# Patient Record
Sex: MALE | Race: WHITE | Employment: OTHER | URBAN - METROPOLITAN AREA
[De-identification: names, ages, dates, MRNs, and addresses within clinical notes are randomized per-mention and may not be internally consistent; named-entity substitution may affect disease eponyms.]

---

## 2018-07-25 ENCOUNTER — OFFICE VISIT (OUTPATIENT)
Dept: ORTHOPEDIC SURGERY | Age: 68
End: 2018-07-25

## 2018-07-25 DIAGNOSIS — R20.0 NUMBNESS OF RIGHT HAND: ICD-10-CM

## 2018-07-25 DIAGNOSIS — M79.641 HAND PAIN, RIGHT: Primary | ICD-10-CM

## 2018-07-25 PROCEDURE — 1101F PT FALLS ASSESS-DOCD LE1/YR: CPT | Performed by: ORTHOPAEDIC SURGERY

## 2018-07-25 PROCEDURE — 4040F PNEUMOC VAC/ADMIN/RCVD: CPT | Performed by: ORTHOPAEDIC SURGERY

## 2018-07-25 PROCEDURE — 4004F PT TOBACCO SCREEN RCVD TLK: CPT | Performed by: ORTHOPAEDIC SURGERY

## 2018-07-25 PROCEDURE — 3017F COLORECTAL CA SCREEN DOC REV: CPT | Performed by: ORTHOPAEDIC SURGERY

## 2018-07-25 PROCEDURE — G8421 BMI NOT CALCULATED: HCPCS | Performed by: ORTHOPAEDIC SURGERY

## 2018-07-25 PROCEDURE — G8427 DOCREV CUR MEDS BY ELIG CLIN: HCPCS | Performed by: ORTHOPAEDIC SURGERY

## 2018-07-25 PROCEDURE — 99203 OFFICE O/P NEW LOW 30 MIN: CPT | Performed by: ORTHOPAEDIC SURGERY

## 2018-07-25 PROCEDURE — 1123F ACP DISCUSS/DSCN MKR DOCD: CPT | Performed by: ORTHOPAEDIC SURGERY

## 2018-07-25 NOTE — PROGRESS NOTES
tablet Take 1 tablet by mouth daily. No current facility-administered medications for this visit. Allergies   Allergen Reactions    Oxycodone-Acetaminophen Itching    Percodan [Oxycodone-Aspirin] Itching       ROS:  ROS neg except for positives in HPI    PHYSICAL EXAMINATION:    Gen/Psych: Examination reveals a pleasant individual in no acute distress. The patient is oriented to time, place and person. The patient's mood and affect are appropriate. Lymph: The lymphatic examination bilaterally reveals all areas to be without enlargement or induration. Skin intact without lymphadenopathy, discoloration, or abnormal temperature. Vascular: There is intact, symmetric circulation in both upper extremities. Musculoskeletal:       Cervical spine, shoulders, elbows, wrist:  satisfactory comfortable baseline range of motion, strength, and stability bilaterally       Right Hand and digits:   Satisfactory range of motion bilaterally. Mild thenar atrophy    Neurological:  Direct compression, Tinel's, and Phalen's testing reproduces the patient's symptoms into the median nerve distribution over the right carpal tunnel    Radiology:     X-rays obtained and reviewed in office:  Views 3  Location Right hand  Impression :  Arthritic change noted DIP joint index finger. Well aligned wrist without obvious arthritis    DIAGNOSTIC TESTING: EMG: were not done      IMPRESSION AND PLAN: Right hand numbness    Likely Right carpal tunnel syndrome. I have reviewed the diagnosis and evidence based treatment options for the patient, both surgical and non-surgical.      EMG has been ordered. Follow-up after testing to discuss results and treatment options. All questions and concerns were addressed today. Patient is in agreement with the plan.         Deryl Phalen, MD  Hand & Upper Extremity Surgery  [address]  A partner of [facility]

## 2018-08-07 ENCOUNTER — OFFICE VISIT (OUTPATIENT)
Dept: ORTHOPEDIC SURGERY | Age: 68
End: 2018-08-07

## 2018-08-07 DIAGNOSIS — G56.01 CARPAL TUNNEL SYNDROME OF RIGHT WRIST: Primary | ICD-10-CM

## 2018-08-07 PROCEDURE — 95908 NRV CNDJ TST 3-4 STUDIES: CPT | Performed by: PHYSICAL MEDICINE & REHABILITATION

## 2018-08-07 PROCEDURE — 95886 MUSC TEST DONE W/N TEST COMP: CPT | Performed by: PHYSICAL MEDICINE & REHABILITATION

## 2018-08-14 ENCOUNTER — OFFICE VISIT (OUTPATIENT)
Dept: ORTHOPEDIC SURGERY | Age: 68
End: 2018-08-14

## 2018-08-14 DIAGNOSIS — G56.01 CARPAL TUNNEL SYNDROME OF RIGHT WRIST: Primary | ICD-10-CM

## 2018-08-14 PROCEDURE — 99213 OFFICE O/P EST LOW 20 MIN: CPT | Performed by: ORTHOPAEDIC SURGERY

## 2018-08-14 PROCEDURE — 1101F PT FALLS ASSESS-DOCD LE1/YR: CPT | Performed by: ORTHOPAEDIC SURGERY

## 2018-08-14 PROCEDURE — 1123F ACP DISCUSS/DSCN MKR DOCD: CPT | Performed by: ORTHOPAEDIC SURGERY

## 2018-08-14 PROCEDURE — 3017F COLORECTAL CA SCREEN DOC REV: CPT | Performed by: ORTHOPAEDIC SURGERY

## 2018-08-14 PROCEDURE — 4040F PNEUMOC VAC/ADMIN/RCVD: CPT | Performed by: ORTHOPAEDIC SURGERY

## 2018-08-14 PROCEDURE — G8421 BMI NOT CALCULATED: HCPCS | Performed by: ORTHOPAEDIC SURGERY

## 2018-08-14 PROCEDURE — G8428 CUR MEDS NOT DOCUMENT: HCPCS | Performed by: ORTHOPAEDIC SURGERY

## 2018-08-14 PROCEDURE — 4004F PT TOBACCO SCREEN RCVD TLK: CPT | Performed by: ORTHOPAEDIC SURGERY

## 2018-08-14 NOTE — PROGRESS NOTES
Chief Complaint   Patient presents with    Follow-up     EMG RANDY CALDERON        HISTORY OF PRESENT ILLNESS:  Lowell Gomez is a 76 y.o.  patient here for repeat evaluation after undergoing EMG testing for suspected Right carpal tunnel syndrome. Symptoms persist despite conservative measures. Symptoms are keeping him up at night. ROS:  ROS neg     Past medical history, medications, and allergies are reviewed again today. There are no changes to report. PHYSICAL EXAMINATION:  Patient is alert and pleasant, in no acute distress. The affected extremity is examined once again today. The right hand and wrist reveal no atrophy. Positive Tinel and positive carpal tunnel compression test.   weakness is present. No sign of ganglion. No clawing of the digits. Decreased light touch sensation thumb index and middle finger. No triggering of the thumb. X-rays:  None needed today. EMG of the Right Upper Extremity is reviewed, impression:  Moderate to severe right carpal tunnel syndrome    IMPRESSION AND PLAN:  Right carpal tunnel syndrome    EMG discussed with the patient today and diagnosis reviewed. We discussed conservative and surgical intervention options, including, but not limited to, activity modifications, oral NSAIDs, splinting, therapy, and cortisone. After a detailed discussion today, the patient would like to proceed with right carpal tunnel release. Surgery to be outpatient, most likely under light sedation and local.  Surgery and time to recovery was outlined. The risks and benefits were discussed thoroughly involving mini-open carpal tunnel release. These included, but were not limited to infection, tendon or nerve injury, scar or thenar sensitivity, need for transfusion, adverse effects of anesthesia, incomplete relief of numbness, pain, and/or weakness. All questions and concerns were addressed today. Patient is in agreement with the plan.         Georges Parsons MD  Hand & Upper Extremity Surgery  [address] partner of [facility]

## 2018-08-22 ENCOUNTER — TELEPHONE (OUTPATIENT)
Dept: ORTHOPEDIC SURGERY | Age: 68
End: 2018-08-22

## 2018-08-30 RX ORDER — GABAPENTIN 300 MG/1
300 CAPSULE ORAL DAILY
Status: ON HOLD | COMMUNITY
End: 2022-10-02 | Stop reason: HOSPADM

## 2018-08-30 RX ORDER — OMEPRAZOLE 10 MG/1
20 CAPSULE, DELAYED RELEASE ORAL DAILY
COMMUNITY

## 2018-08-30 RX ORDER — ATORVASTATIN CALCIUM 80 MG/1
80 TABLET, FILM COATED ORAL DAILY
COMMUNITY

## 2018-08-30 NOTE — PROGRESS NOTES
Patient instructed to:  Bring Picture ID, insurance card, proof of address  Dress Comfortable  No jewelry  No Makeup  Shower evening before or morning of surgery with antibacterial soap. Nothing to eat or drink after midnight the day before surgery. If instructed by MD to take meds day of surgery, take with only a sip of water. If taking am beta blocker, take morning of surgery with sip of water per Dr. Marvin Lozano.

## 2018-09-05 ENCOUNTER — ANESTHESIA EVENT (OUTPATIENT)
Dept: OPERATING ROOM | Age: 68
End: 2018-09-05
Payer: MEDICARE

## 2018-09-06 ENCOUNTER — ANESTHESIA (OUTPATIENT)
Dept: OPERATING ROOM | Age: 68
End: 2018-09-06
Payer: MEDICARE

## 2018-09-06 ENCOUNTER — HOSPITAL ENCOUNTER (OUTPATIENT)
Age: 68
Setting detail: OUTPATIENT SURGERY
Discharge: HOME | End: 2018-09-06
Attending: ORTHOPAEDIC SURGERY | Admitting: ORTHOPAEDIC SURGERY
Payer: MEDICARE

## 2018-09-06 VITALS
HEART RATE: 74 BPM | TEMPERATURE: 97.1 F | BODY MASS INDEX: 40.63 KG/M2 | WEIGHT: 300 LBS | HEIGHT: 72 IN | RESPIRATION RATE: 18 BRPM | SYSTOLIC BLOOD PRESSURE: 121 MMHG | DIASTOLIC BLOOD PRESSURE: 65 MMHG | OXYGEN SATURATION: 96 %

## 2018-09-06 VITALS
RESPIRATION RATE: 2 BRPM | TEMPERATURE: 98.6 F | SYSTOLIC BLOOD PRESSURE: 90 MMHG | OXYGEN SATURATION: 97 % | DIASTOLIC BLOOD PRESSURE: 58 MMHG

## 2018-09-06 DIAGNOSIS — G56.01 CARPAL TUNNEL SYNDROME OF RIGHT WRIST: Primary | ICD-10-CM

## 2018-09-06 PROCEDURE — 7100000001 HC PACU RECOVERY - ADDTL 15 MIN: Performed by: ORTHOPAEDIC SURGERY

## 2018-09-06 PROCEDURE — 2709999900 HC NON-CHARGEABLE SUPPLY: Performed by: ORTHOPAEDIC SURGERY

## 2018-09-06 PROCEDURE — 2580000003 HC RX 258: Performed by: ANESTHESIOLOGY

## 2018-09-06 PROCEDURE — 2720000010 HC SURG SUPPLY STERILE: Performed by: ORTHOPAEDIC SURGERY

## 2018-09-06 PROCEDURE — 6360000002 HC RX W HCPCS: Performed by: ORTHOPAEDIC SURGERY

## 2018-09-06 PROCEDURE — 3700000001 HC ADD 15 MINUTES (ANESTHESIA): Performed by: ORTHOPAEDIC SURGERY

## 2018-09-06 PROCEDURE — 6360000002 HC RX W HCPCS: Performed by: NURSE ANESTHETIST, CERTIFIED REGISTERED

## 2018-09-06 PROCEDURE — 2500000003 HC RX 250 WO HCPCS: Performed by: ORTHOPAEDIC SURGERY

## 2018-09-06 PROCEDURE — 3700000000 HC ANESTHESIA ATTENDED CARE: Performed by: ORTHOPAEDIC SURGERY

## 2018-09-06 PROCEDURE — 3600000004 HC SURGERY LEVEL 4 BASE: Performed by: ORTHOPAEDIC SURGERY

## 2018-09-06 PROCEDURE — 3600000014 HC SURGERY LEVEL 4 ADDTL 15MIN: Performed by: ORTHOPAEDIC SURGERY

## 2018-09-06 PROCEDURE — 7100000011 HC PHASE II RECOVERY - ADDTL 15 MIN: Performed by: ORTHOPAEDIC SURGERY

## 2018-09-06 PROCEDURE — 7100000000 HC PACU RECOVERY - FIRST 15 MIN: Performed by: ORTHOPAEDIC SURGERY

## 2018-09-06 PROCEDURE — 7100000010 HC PHASE II RECOVERY - FIRST 15 MIN: Performed by: ORTHOPAEDIC SURGERY

## 2018-09-06 RX ORDER — LABETALOL HYDROCHLORIDE 5 MG/ML
5 INJECTION, SOLUTION INTRAVENOUS EVERY 10 MIN PRN
Status: DISCONTINUED | OUTPATIENT
Start: 2018-09-06 | End: 2018-09-06 | Stop reason: HOSPADM

## 2018-09-06 RX ORDER — MEPERIDINE HYDROCHLORIDE 50 MG/ML
12.5 INJECTION INTRAMUSCULAR; INTRAVENOUS; SUBCUTANEOUS EVERY 5 MIN PRN
Status: DISCONTINUED | OUTPATIENT
Start: 2018-09-06 | End: 2018-09-06 | Stop reason: HOSPADM

## 2018-09-06 RX ORDER — BUPIVACAINE HYDROCHLORIDE 5 MG/ML
INJECTION, SOLUTION EPIDURAL; INTRACAUDAL PRN
Status: DISCONTINUED | OUTPATIENT
Start: 2018-09-06 | End: 2018-09-06 | Stop reason: HOSPADM

## 2018-09-06 RX ORDER — ONDANSETRON 2 MG/ML
4 INJECTION INTRAMUSCULAR; INTRAVENOUS EVERY 10 MIN PRN
Status: DISCONTINUED | OUTPATIENT
Start: 2018-09-06 | End: 2018-09-06 | Stop reason: HOSPADM

## 2018-09-06 RX ORDER — PROPOFOL 10 MG/ML
INJECTION, EMULSION INTRAVENOUS PRN
Status: DISCONTINUED | OUTPATIENT
Start: 2018-09-06 | End: 2018-09-06 | Stop reason: SDUPTHER

## 2018-09-06 RX ORDER — LIDOCAINE HYDROCHLORIDE 10 MG/ML
1 INJECTION, SOLUTION EPIDURAL; INFILTRATION; INTRACAUDAL; PERINEURAL
Status: DISCONTINUED | OUTPATIENT
Start: 2018-09-06 | End: 2018-09-06 | Stop reason: HOSPADM

## 2018-09-06 RX ORDER — SODIUM CHLORIDE 0.9 % (FLUSH) 0.9 %
10 SYRINGE (ML) INJECTION PRN
Status: DISCONTINUED | OUTPATIENT
Start: 2018-09-06 | End: 2018-09-06 | Stop reason: HOSPADM

## 2018-09-06 RX ORDER — MIDAZOLAM HYDROCHLORIDE 1 MG/ML
INJECTION INTRAMUSCULAR; INTRAVENOUS PRN
Status: DISCONTINUED | OUTPATIENT
Start: 2018-09-06 | End: 2018-09-06 | Stop reason: SDUPTHER

## 2018-09-06 RX ORDER — HYDROCODONE BITARTRATE AND ACETAMINOPHEN 5; 325 MG/1; MG/1
1 TABLET ORAL EVERY 6 HOURS PRN
Qty: 25 TABLET | Refills: 0 | Status: SHIPPED | OUTPATIENT
Start: 2018-09-06 | End: 2018-09-13

## 2018-09-06 RX ORDER — SODIUM CHLORIDE 0.9 % (FLUSH) 0.9 %
10 SYRINGE (ML) INJECTION EVERY 12 HOURS SCHEDULED
Status: DISCONTINUED | OUTPATIENT
Start: 2018-09-06 | End: 2018-09-06 | Stop reason: HOSPADM

## 2018-09-06 RX ORDER — FENTANYL CITRATE 50 UG/ML
INJECTION, SOLUTION INTRAMUSCULAR; INTRAVENOUS PRN
Status: DISCONTINUED | OUTPATIENT
Start: 2018-09-06 | End: 2018-09-06 | Stop reason: SDUPTHER

## 2018-09-06 RX ORDER — SODIUM CHLORIDE, SODIUM LACTATE, POTASSIUM CHLORIDE, CALCIUM CHLORIDE 600; 310; 30; 20 MG/100ML; MG/100ML; MG/100ML; MG/100ML
INJECTION, SOLUTION INTRAVENOUS CONTINUOUS
Status: DISCONTINUED | OUTPATIENT
Start: 2018-09-06 | End: 2018-09-06 | Stop reason: HOSPADM

## 2018-09-06 RX ORDER — DEXAMETHASONE SODIUM PHOSPHATE 4 MG/ML
INJECTION, SOLUTION INTRA-ARTICULAR; INTRALESIONAL; INTRAMUSCULAR; INTRAVENOUS; SOFT TISSUE PRN
Status: DISCONTINUED | OUTPATIENT
Start: 2018-09-06 | End: 2018-09-06 | Stop reason: SDUPTHER

## 2018-09-06 RX ORDER — ONDANSETRON 2 MG/ML
INJECTION INTRAMUSCULAR; INTRAVENOUS PRN
Status: DISCONTINUED | OUTPATIENT
Start: 2018-09-06 | End: 2018-09-06 | Stop reason: SDUPTHER

## 2018-09-06 RX ORDER — HYDRALAZINE HYDROCHLORIDE 20 MG/ML
5 INJECTION INTRAMUSCULAR; INTRAVENOUS EVERY 10 MIN PRN
Status: DISCONTINUED | OUTPATIENT
Start: 2018-09-06 | End: 2018-09-06 | Stop reason: HOSPADM

## 2018-09-06 RX ADMIN — Medication 3 G: at 08:28

## 2018-09-06 RX ADMIN — PROPOFOL 150 MG: 10 INJECTION, EMULSION INTRAVENOUS at 08:25

## 2018-09-06 RX ADMIN — MIDAZOLAM HYDROCHLORIDE 2 MG: 2 INJECTION, SOLUTION INTRAMUSCULAR; INTRAVENOUS at 08:22

## 2018-09-06 RX ADMIN — FENTANYL CITRATE 100 MCG: 50 INJECTION INTRAMUSCULAR; INTRAVENOUS at 08:22

## 2018-09-06 RX ADMIN — DEXAMETHASONE SODIUM PHOSPHATE 10 MG: 4 INJECTION, SOLUTION INTRAMUSCULAR; INTRAVENOUS at 08:31

## 2018-09-06 RX ADMIN — ONDANSETRON 4 MG: 2 INJECTION INTRAMUSCULAR; INTRAVENOUS at 08:31

## 2018-09-06 RX ADMIN — SODIUM CHLORIDE, POTASSIUM CHLORIDE, SODIUM LACTATE AND CALCIUM CHLORIDE: 600; 310; 30; 20 INJECTION, SOLUTION INTRAVENOUS at 06:55

## 2018-09-06 RX ADMIN — PROPOFOL 50 MG: 10 INJECTION, EMULSION INTRAVENOUS at 08:32

## 2018-09-06 ASSESSMENT — PAIN - FUNCTIONAL ASSESSMENT: PAIN_FUNCTIONAL_ASSESSMENT: 0-10

## 2018-09-06 ASSESSMENT — PULMONARY FUNCTION TESTS
PIF_VALUE: 2
PIF_VALUE: 5
PIF_VALUE: 0
PIF_VALUE: 3
PIF_VALUE: 7
PIF_VALUE: 3
PIF_VALUE: 3
PIF_VALUE: 2
PIF_VALUE: 0
PIF_VALUE: 4
PIF_VALUE: 2
PIF_VALUE: 1
PIF_VALUE: 1
PIF_VALUE: 7
PIF_VALUE: 2
PIF_VALUE: 0

## 2018-09-06 ASSESSMENT — PAIN SCALES - GENERAL: PAINLEVEL_OUTOF10: 0

## 2018-09-06 NOTE — ANESTHESIA PRE PROCEDURE
Department of Anesthesiology  Preprocedure Note       Name:  Terry Amanda   Age:  76 y.o.  :  1950                                          MRN:  [id number]         Date:  2018      Surgeon: Cedric Hines):  Kay Khan MD    Procedure: Procedure(s):  RIGHT CARPAL TUNNEL RELEASE    Medications prior to admission:   Prior to Admission medications    Medication Sig Start Date End Date Taking? Authorizing Provider   LOSARTAN POTASSIUM-HCTZ PO Take by mouth daily   Yes Historical Provider, MD   omeprazole (PRILOSEC) 10 MG delayed release capsule Take 10 mg by mouth daily   Yes Historical Provider, MD   atorvastatin (LIPITOR) 80 MG tablet Take 80 mg by mouth daily   Yes Historical Provider, MD   gabapentin (NEURONTIN) 300 MG capsule Take 300 mg by mouth daily. .   Yes Historical Provider, MD   celecoxib (CELEBREX) 200 MG capsule Take 200 mg by mouth daily. Historical Provider, MD   FLUoxetine (PROZAC) 10 MG tablet Take 10 mg by mouth daily. Historical Provider, MD       Current medications:    No current facility-administered medications for this encounter. Current Outpatient Prescriptions   Medication Sig Dispense Refill    LOSARTAN POTASSIUM-HCTZ PO Take by mouth daily      omeprazole (PRILOSEC) 10 MG delayed release capsule Take 10 mg by mouth daily      atorvastatin (LIPITOR) 80 MG tablet Take 80 mg by mouth daily      gabapentin (NEURONTIN) 300 MG capsule Take 300 mg by mouth daily. Gege Goes celecoxib (CELEBREX) 200 MG capsule Take 200 mg by mouth daily.  FLUoxetine (PROZAC) 10 MG tablet Take 10 mg by mouth daily. Allergies:     Allergies   Allergen Reactions    Adhesive Tape Other (See Comments)     blisters    Oxycodone-Acetaminophen Itching    Percodan [Oxycodone-Aspirin] Itching       Problem List:    Patient Active Problem List   Diagnosis Code    Left knee pain M25.562    Baker cyst M71.20    Osteoarthritis of left knee M17.12    Knee effusion M25.469

## 2018-09-06 NOTE — PROGRESS NOTES
Discharge instructions reviewed with patient and family. Wife at bedside. Tolerating fluids.  No complaints of pain

## 2018-09-06 NOTE — OP NOTE
Kandy Michel (1950)    Date of Surgery- 9/6/2018    Pre-Op Diagnoses:  1.   right carpal tunnel syndrome  2. Post-op Diagnoses:   1. Same  2. Procedure(s) Performed:  1.  right carpal tunnel release, mini open  2. Surgeon: Dian Saxena MD    Anesthesia Type:   Local/Gen    Blood Loss:   2 cc    Pathology:   None    Complications:   None    Assistant:  None    The right palm was marked in preop holding by Dr Biju Dorman after discussing the surgical procedure once again with the patient. All questions and concerns were addressed. Informed consent was on the chart. The patient was brought to the operating and room and placed in the supine position. After the induction of anesthesia a standard time-out was performed. Description of the Procedure: We verified that antibiotics had been given. The right upper extremity was prepped and draped in normal sterile fashion. Formal timeout had been held before the injection and once again after the draping procedure. The upper extremity was exsanguinated and the tourniquet was inflated to 250 mmHg. A standard 1.5 cm incision was made in the mid palm. This was carried down through the subcutaneous tissues and palmar fascia to the transverse carpal ligament. The distal one half of the transverse carpal ligament was incised longitudinally under direct vision using a #15 blade. The carpal tunnel release guide was then placed under direct vision beneath the transverse carpal ligament and slid proximally. The guide was palpated into appropriate alignment longitudinally. Contact with the transverse carpal ligament was maintained throughout passing. The blade was engaged into the guide and the remaining portion of the transverse carpal ligament released completely. No other abnormalities were noted. The nerve was nicely decompressed and healthy-appearing.   The wound was copiously irrigated and the skin closed using horizontal mattress #4-0 nylon suture. Tourniquet was deflated and all fingers were warm and well perfused. Soft sterile dressing was placed. Patient was awoken from the sedation, tolerated the case quite well, was taken to the postanesthesia recovery unit in good condition. No complications. Findings:  No aberrant motor branch    Intervention:  1% Xylocaine, 0.25% Marcaine, without epinephrine as local injection    Other Notes: Follow-up in 7-10 days for wound inspection and likely suture removal.  Patient will be taught a home exercise range of motion program for the wrist and fingers along with scar and thenar massage. If there are any concerns or if the patient desires, hand therapy will be ordered.       Russell Lehman MD  Hand & Upper Extremity Surgery  [address] [facility] partner of [facility] ([facility])

## 2018-09-06 NOTE — PROGRESS NOTES
To PACU per cart. Oral airway in place. Removed per CRNA. Patient denies pain when asked.  Report received from OR staff

## 2018-09-14 ENCOUNTER — TELEPHONE (OUTPATIENT)
Dept: ORTHOPEDIC SURGERY | Age: 68
End: 2018-09-14

## 2018-09-19 ENCOUNTER — OFFICE VISIT (OUTPATIENT)
Dept: ORTHOPEDIC SURGERY | Age: 68
End: 2018-09-19

## 2018-09-19 DIAGNOSIS — G56.01 CARPAL TUNNEL SYNDROME ON RIGHT: Primary | ICD-10-CM

## 2018-09-19 DIAGNOSIS — G56.01 CARPAL TUNNEL SYNDROME OF RIGHT WRIST: Primary | ICD-10-CM

## 2018-09-19 PROCEDURE — 99024 POSTOP FOLLOW-UP VISIT: CPT | Performed by: ORTHOPAEDIC SURGERY

## 2018-09-19 RX ORDER — METHYLPREDNISOLONE 4 MG/1
TABLET ORAL
Qty: 1 KIT | Refills: 0 | Status: SHIPPED | OUTPATIENT
Start: 2018-09-19 | End: 2018-09-19 | Stop reason: CLARIF

## 2018-09-19 RX ORDER — METHYLPREDNISOLONE 4 MG/1
TABLET ORAL
Qty: 1 KIT | Refills: 0 | Status: SHIPPED | OUTPATIENT
Start: 2018-09-19 | End: 2018-09-25

## 2018-09-19 RX ORDER — HYDROCODONE BITARTRATE AND ACETAMINOPHEN 5; 325 MG/1; MG/1
1 TABLET ORAL EVERY 8 HOURS PRN
Qty: 21 TABLET | Refills: 0 | Status: SHIPPED | OUTPATIENT
Start: 2018-09-19 | End: 2018-09-19 | Stop reason: CLARIF

## 2018-09-19 RX ORDER — HYDROCODONE BITARTRATE AND ACETAMINOPHEN 5; 325 MG/1; MG/1
1 TABLET ORAL EVERY 8 HOURS PRN
Qty: 21 TABLET | Refills: 0 | Status: SHIPPED | OUTPATIENT
Start: 2018-09-19 | End: 2018-09-26

## 2018-09-19 NOTE — PROGRESS NOTES
Chief Complaint   Patient presents with    Post-Op Check     Right hand CTR sx 9/6/2018       HISTORY OF PRESENT ILLNESS:  Lowell Gomez is a 76 y.o.  patient here for post-op follow up after right carpal tunnel release. Surgery was now 2 weeks ago. The patient is doing quite well overall. Nocturnal symptoms are gone. Radial hand numbness is better but not gone    ROS:  ROS neg     MEDICAL HISTORY:  Patient's medications, allergies, past medical, surgical, social and family histories were reviewed and updated as appropriate. PHYSICAL EXAMINATION:  Alert and pleasant, no distress. The right hand and wrist is examined. The wound is healing without signs of infection or dehiscense. Mild swelling noted. There is satisfactory range of motion of the wrist and fingers. Thumb abduction is present. Fingers are well perfused. IMPRESSION AND PLAN:  right carpal tunnel sydrome  Doing well after right carpal tunnel release. We will plan for suture removal and discuss appropriate wound care. We discussed scar and thenar massage, progressive ROM of hand and fingers. Activities may be advanced depending on comfort. If patient desires hand therapy at any point during recovery, we will be happy to place this order. We have reordered pain medication, he understands be weaning off. For the postoperative inflammation about the nerve we have ordered steroid, he does not have diabetes. Cemented benefits outlined. He is taken this medication the past well without side effect. Follow-up in 8 weeks unless symptoms dictate sooner. All questions and concerns were addressed today. Patient is in agreement with the plan.         Georges Parsons MD  Hand & Upper Extremity Surgery  [address] [place] [address] partner of [facility] [address]

## 2018-10-17 ENCOUNTER — OFFICE VISIT (OUTPATIENT)
Dept: ORTHOPEDIC SURGERY | Age: 68
End: 2018-10-17
Payer: MEDICARE

## 2018-10-17 VITALS — HEIGHT: 72 IN | WEIGHT: 300.05 LBS | BODY MASS INDEX: 40.64 KG/M2

## 2018-10-17 DIAGNOSIS — G56.01 CARPAL TUNNEL SYNDROME ON RIGHT: Primary | ICD-10-CM

## 2018-10-17 PROCEDURE — 20526 THER INJECTION CARP TUNNEL: CPT | Performed by: ORTHOPAEDIC SURGERY

## 2018-10-17 PROCEDURE — 99024 POSTOP FOLLOW-UP VISIT: CPT | Performed by: ORTHOPAEDIC SURGERY

## 2018-10-17 PROCEDURE — L3908 WHO COCK-UP NONMOLDE PRE OTS: HCPCS | Performed by: ORTHOPAEDIC SURGERY

## 2018-10-17 RX ORDER — OXYCODONE HYDROCHLORIDE AND ACETAMINOPHEN 5; 325 MG/1; MG/1
1 TABLET ORAL 2 TIMES DAILY
Qty: 14 TABLET | Refills: 0 | Status: SHIPPED | OUTPATIENT
Start: 2018-10-17 | End: 2018-10-24

## 2018-12-04 ENCOUNTER — TELEPHONE (OUTPATIENT)
Dept: ORTHOPEDIC SURGERY | Age: 68
End: 2018-12-04

## 2018-12-05 ENCOUNTER — TELEPHONE (OUTPATIENT)
Dept: ORTHOPEDIC SURGERY | Age: 68
End: 2018-12-05

## 2018-12-18 ENCOUNTER — OFFICE VISIT (OUTPATIENT)
Dept: ORTHOPEDIC SURGERY | Age: 68
End: 2018-12-18
Payer: MEDICARE

## 2018-12-18 VITALS
WEIGHT: 300.05 LBS | SYSTOLIC BLOOD PRESSURE: 132 MMHG | HEIGHT: 72 IN | HEART RATE: 69 BPM | DIASTOLIC BLOOD PRESSURE: 82 MMHG | BODY MASS INDEX: 40.64 KG/M2

## 2018-12-18 DIAGNOSIS — G56.01 CARPAL TUNNEL SYNDROME OF RIGHT WRIST: Primary | ICD-10-CM

## 2018-12-18 PROCEDURE — 4040F PNEUMOC VAC/ADMIN/RCVD: CPT | Performed by: ORTHOPAEDIC SURGERY

## 2018-12-18 PROCEDURE — 99212 OFFICE O/P EST SF 10 MIN: CPT | Performed by: ORTHOPAEDIC SURGERY

## 2018-12-18 PROCEDURE — G8484 FLU IMMUNIZE NO ADMIN: HCPCS | Performed by: ORTHOPAEDIC SURGERY

## 2018-12-18 PROCEDURE — 1101F PT FALLS ASSESS-DOCD LE1/YR: CPT | Performed by: ORTHOPAEDIC SURGERY

## 2018-12-18 PROCEDURE — 1036F TOBACCO NON-USER: CPT | Performed by: ORTHOPAEDIC SURGERY

## 2018-12-18 PROCEDURE — G8417 CALC BMI ABV UP PARAM F/U: HCPCS | Performed by: ORTHOPAEDIC SURGERY

## 2018-12-18 PROCEDURE — 1123F ACP DISCUSS/DSCN MKR DOCD: CPT | Performed by: ORTHOPAEDIC SURGERY

## 2018-12-18 PROCEDURE — G8427 DOCREV CUR MEDS BY ELIG CLIN: HCPCS | Performed by: ORTHOPAEDIC SURGERY

## 2018-12-18 PROCEDURE — 3017F COLORECTAL CA SCREEN DOC REV: CPT | Performed by: ORTHOPAEDIC SURGERY

## 2019-02-04 DIAGNOSIS — G56.01 CARPAL TUNNEL SYNDROME ON RIGHT: Primary | ICD-10-CM

## 2019-02-04 PROCEDURE — L3908 WHO COCK-UP NONMOLDE PRE OTS: HCPCS | Performed by: ORTHOPAEDIC SURGERY

## 2021-06-02 ENCOUNTER — CLINICAL DOCUMENTATION (OUTPATIENT)
Dept: OTHER | Age: 71
End: 2021-06-02

## 2021-08-24 ENCOUNTER — APPOINTMENT (OUTPATIENT)
Dept: CT IMAGING | Age: 71
End: 2021-08-24
Payer: MEDICARE

## 2021-08-24 ENCOUNTER — HOSPITAL ENCOUNTER (EMERGENCY)
Age: 71
Discharge: HOME | End: 2021-08-25
Attending: EMERGENCY MEDICINE | Admitting: EMERGENCY MEDICINE
Payer: MEDICARE

## 2021-08-24 ENCOUNTER — APPOINTMENT (OUTPATIENT)
Dept: GENERAL RADIOLOGY | Age: 71
End: 2021-08-24
Payer: MEDICARE

## 2021-08-24 DIAGNOSIS — I10 UNCONTROLLED HYPERTENSION: Primary | ICD-10-CM

## 2021-08-24 LAB
A/G RATIO: 1.1 (ref 1.1–2.2)
ALBUMIN SERPL-MCNC: 4.3 G/DL (ref 3.4–5)
ALP BLD-CCNC: 114 U/L (ref 40–129)
ALT SERPL-CCNC: 16 U/L (ref 10–40)
ANION GAP SERPL CALCULATED.3IONS-SCNC: 12 MMOL/L (ref 3–16)
AST SERPL-CCNC: 19 U/L (ref 15–37)
BACTERIA: ABNORMAL /HPF
BASOPHILS ABSOLUTE: 0 K/UL (ref 0–0.2)
BASOPHILS RELATIVE PERCENT: 0.4 %
BILIRUB SERPL-MCNC: 0.8 MG/DL (ref 0–1)
BILIRUBIN URINE: NEGATIVE
BLOOD, URINE: NEGATIVE
BUN BLDV-MCNC: 17 MG/DL (ref 7–20)
CALCIUM SERPL-MCNC: 10.3 MG/DL (ref 8.3–10.6)
CHLORIDE BLD-SCNC: 100 MMOL/L (ref 99–110)
CLARITY: CLEAR
CO2: 24 MMOL/L (ref 21–32)
COLOR: YELLOW
CREAT SERPL-MCNC: 0.8 MG/DL (ref 0.8–1.3)
EOSINOPHILS ABSOLUTE: 0 K/UL (ref 0–0.6)
EOSINOPHILS RELATIVE PERCENT: 0 %
EPITHELIAL CELLS, UA: ABNORMAL /HPF (ref 0–5)
GFR AFRICAN AMERICAN: >60
GFR NON-AFRICAN AMERICAN: >60
GLOBULIN: 3.8 G/DL
GLUCOSE BLD-MCNC: 139 MG/DL (ref 70–99)
GLUCOSE URINE: NEGATIVE MG/DL
HCT VFR BLD CALC: 45.9 % (ref 40.5–52.5)
HEMOGLOBIN: 15.9 G/DL (ref 13.5–17.5)
HYALINE CASTS: ABNORMAL /LPF (ref 0–2)
KETONES, URINE: NEGATIVE MG/DL
LEUKOCYTE ESTERASE, URINE: NEGATIVE
LIPASE: 10 U/L (ref 13–60)
LYMPHOCYTES ABSOLUTE: 1 K/UL (ref 1–5.1)
LYMPHOCYTES RELATIVE PERCENT: 7.7 %
MCH RBC QN AUTO: 30.7 PG (ref 26–34)
MCHC RBC AUTO-ENTMCNC: 34.6 G/DL (ref 31–36)
MCV RBC AUTO: 88.5 FL (ref 80–100)
MICROSCOPIC EXAMINATION: YES
MONOCYTES ABSOLUTE: 0.3 K/UL (ref 0–1.3)
MONOCYTES RELATIVE PERCENT: 2.7 %
MUCUS: ABNORMAL /LPF
NEUTROPHILS ABSOLUTE: 11.2 K/UL (ref 1.7–7.7)
NEUTROPHILS RELATIVE PERCENT: 89.2 %
NITRITE, URINE: NEGATIVE
PDW BLD-RTO: 13.4 % (ref 12.4–15.4)
PH UA: 6.5 (ref 5–8)
PLATELET # BLD: 212 K/UL (ref 135–450)
PMV BLD AUTO: 9.1 FL (ref 5–10.5)
POTASSIUM REFLEX MAGNESIUM: 3.9 MMOL/L (ref 3.5–5.1)
PROTEIN UA: 100 MG/DL
RBC # BLD: 5.18 M/UL (ref 4.2–5.9)
RBC UA: ABNORMAL /HPF (ref 0–4)
SODIUM BLD-SCNC: 136 MMOL/L (ref 136–145)
SPECIFIC GRAVITY UA: >=1.03 (ref 1–1.03)
SPECIMEN STATUS: NORMAL
TOTAL PROTEIN: 8.1 G/DL (ref 6.4–8.2)
TROPONIN: <0.01 NG/ML
URINE REFLEX TO CULTURE: ABNORMAL
URINE TYPE: ABNORMAL
UROBILINOGEN, URINE: 0.2 E.U./DL
WBC # BLD: 12.6 K/UL (ref 4–11)
WBC UA: ABNORMAL /HPF (ref 0–5)

## 2021-08-24 PROCEDURE — 99284 EMERGENCY DEPT VISIT MOD MDM: CPT

## 2021-08-24 PROCEDURE — 93005 ELECTROCARDIOGRAM TRACING: CPT | Performed by: EMERGENCY MEDICINE

## 2021-08-24 PROCEDURE — 6360000004 HC RX CONTRAST MEDICATION: Performed by: EMERGENCY MEDICINE

## 2021-08-24 PROCEDURE — 83690 ASSAY OF LIPASE: CPT

## 2021-08-24 PROCEDURE — 70498 CT ANGIOGRAPHY NECK: CPT

## 2021-08-24 PROCEDURE — 80053 COMPREHEN METABOLIC PANEL: CPT

## 2021-08-24 PROCEDURE — 70450 CT HEAD/BRAIN W/O DYE: CPT

## 2021-08-24 PROCEDURE — 84484 ASSAY OF TROPONIN QUANT: CPT

## 2021-08-24 PROCEDURE — 85025 COMPLETE CBC W/AUTO DIFF WBC: CPT

## 2021-08-24 PROCEDURE — 81001 URINALYSIS AUTO W/SCOPE: CPT

## 2021-08-24 PROCEDURE — 71045 X-RAY EXAM CHEST 1 VIEW: CPT

## 2021-08-24 RX ADMIN — IOPAMIDOL 75 ML: 755 INJECTION, SOLUTION INTRAVENOUS at 22:50

## 2021-08-24 ASSESSMENT — ENCOUNTER SYMPTOMS
RHINORRHEA: 0
SHORTNESS OF BREATH: 0
NAUSEA: 1
BACK PAIN: 0
VOMITING: 1

## 2021-08-25 VITALS
SYSTOLIC BLOOD PRESSURE: 178 MMHG | RESPIRATION RATE: 16 BRPM | DIASTOLIC BLOOD PRESSURE: 83 MMHG | HEART RATE: 77 BPM | TEMPERATURE: 98.4 F | OXYGEN SATURATION: 97 %

## 2021-08-25 LAB
EKG ATRIAL RATE: 74 BPM
EKG DIAGNOSIS: NORMAL
EKG P AXIS: 74 DEGREES
EKG P-R INTERVAL: 154 MS
EKG Q-T INTERVAL: 438 MS
EKG QRS DURATION: 112 MS
EKG QTC CALCULATION (BAZETT): 486 MS
EKG R AXIS: 49 DEGREES
EKG T AXIS: 36 DEGREES
EKG VENTRICULAR RATE: 74 BPM

## 2021-08-25 PROCEDURE — 93010 ELECTROCARDIOGRAM REPORT: CPT | Performed by: INTERNAL MEDICINE

## 2021-08-25 NOTE — ED PROVIDER NOTES
[address] [facility]  ED  EMERGENCY DEPARTMENT ENCOUNTER      Pt Name: Heather Luis  MRN: [id number]  Armstrongfomaira 1950  Date of evaluation: 8/24/2021  Provider: Carlos Conway MD    CHIEF COMPLAINT       Chief Complaint   Patient presents with    Other     states my family thinks i had a stroke yesterdya when asked why he said becasue my BP is high         HISTORY OF PRESENT ILLNESS   (Location/Symptom, Timing/Onset,Context/Setting, Quality, Duration, Modifying Factors, Severity)  Note limiting factors. Heather Luis is a 70 y.o. male who presents to the emergency department because his family was concerned he was having a stroke. Since yesterday he went to his doctor's appointment and his blood pressure had been in the 160s. The patient admits that he has not been taking his antihypertensives. His wife also notices since yesterday he has been acting very confused and it is hard to get him to express himself. Early this morning he woke up at about 4am with a very bad headache and was vomiting. He states he does still have a headache presently and he is still nauseated. Over the past 2 to 3 weeks he has noticed a change in his vision that it has been more blurry than usual.      Nursing notes were reviewed. REVIEW OF SYSTEMS    (2-9 systems for level 4, 10 or more for level 5)     Review of Systems   Constitutional: Negative for fever. HENT: Negative for rhinorrhea. Eyes: Positive for visual disturbance. Blurriness for 2-3 weeks   Respiratory: Negative for shortness of breath. Cardiovascular: Negative for chest pain. Gastrointestinal: Positive for nausea and vomiting. Endocrine: Negative for polyuria. Genitourinary: Negative for dysuria and flank pain. Musculoskeletal: Negative for back pain. Skin: Negative for rash. Neurological: Positive for speech difficulty and headaches. Hematological: Does not bruise/bleed easily.    Psychiatric/Behavioral: Positive for confusion. PAST MEDICAL HISTORY     Past Medical History:   Diagnosis Date    Acid reflux     BP (high blood pressure)     Chronic back pain 2016    fell off a fishing dock     Depression     Hyperlipidemia     Neuropathy     Sleep apnea     uses CPAP         SURGICALHISTORY       Past Surgical History:   Procedure Laterality Date    ANKLE FRACTURE SURGERY Right     KNEE SURGERY Bilateral     ID REVISE MEDIAN N/CARPAL TUNNEL SURG Right 2018    RIGHT CARPAL TUNNEL RELEASE performed by Catrina Meza MD at [id number] [place] [address] Right     SHOULDER SURGERY Bilateral          CURRENT MEDICATIONS       Discharge Medication List as of 2021 12:15 AM      CONTINUE these medications which have NOT CHANGED    Details   LOSARTAN POTASSIUM-HCTZ PO Take by mouth dailyHistorical Med      omeprazole (PRILOSEC) 10 MG delayed release capsule Take 10 mg by mouth dailyHistorical Med      atorvastatin (LIPITOR) 80 MG tablet Take 80 mg by mouth dailyHistorical Med      gabapentin (NEURONTIN) 300 MG capsule Take 300 mg by mouth daily. Joie Ernestine Historical Med      celecoxib (CELEBREX) 200 MG capsule Take 200 mg by mouth daily. FLUoxetine (PROZAC) 10 MG tablet Take 10 mg by mouth daily. ALLERGIES     Adhesive tape, Oxycodone-acetaminophen, and Percodan [oxycodone-aspirin]    FAMILY HISTORY     History reviewed. No pertinent family history.        SOCIAL HISTORY       Social History     Socioeconomic History    Marital status:      Spouse name: None    Number of children: None    Years of education: None    Highest education level: None   Occupational History    None   Tobacco Use    Smoking status: Former Smoker     Packs/day: 2.00     Years: 49.00     Pack years: 98.00     Types: Cigarettes     Quit date: 2014     Years since quittin.3    Smokeless tobacco: Never Used   Substance and Sexual Activity    Alcohol use: No    Drug use: No    Sexual activity: None   Other Topics Concern    None   Social History Narrative    None     Social Determinants of Health     Financial Resource Strain:     Difficulty of Paying Living Expenses:    Food Insecurity:     Worried About Running Out of Food in the Last Year:     [address] in the Last Year:    Transportation Needs:     Lack of Transportation (Medical):  Lack of Transportation (Non-Medical):    Physical Activity:     Days of Exercise per Week:     Minutes of Exercise per Session:    Stress:     Feeling of Stress :    Social Connections:     Frequency of Communication with Friends and Family:     Frequency of Social Gatherings with Friends and Family:     Attends Mosque Services:     Active Member of Clubs or Organizations:     Attends Club or Organization Meetings:     Marital Status:    Intimate Partner Violence:     Fear of Current or Ex-Partner:     Emotionally Abused:     Physically Abused:     Sexually Abused:        SCREENINGS   NIH Stroke Scale  NIH Stroke Scale Assessed: Yes  Interval: Baseline  Level of Consciousness (1a. ): Alert  LOC Questions (1b. ): Answers both correctly  LOC Commands (1c. ): Performs both tasks correctly  Best Gaze (2. ): Normal  Visual (3. ): No visual loss  Facial Palsy (4. ): Normal symmetrical movement  Motor Arm, Left (5a. ): No drift  Motor Arm, Right (5b. ): No drift  Motor Leg, Left (6a. ): No drift  Motor Leg, Right (6b. ): No drift  Limb Ataxia (7. ): Absent  Sensory (8. ): Normal  Best Language (9. ): No aphasia  Dysarthria (10. ): Normal  Extinction and Inattention (11): No abnormality  Total: 0         PHYSICAL EXAM    (up to 7 for level 4, 8 or more for level 5)     ED Triage Vitals [08/24/21 1902]   BP Temp Temp Source Pulse Resp SpO2 Height Weight   (!) 212/105 98.4 °F (36.9 °C) Oral 82 16 95 % -- --       Physical Exam  Vitals and nursing note reviewed. Constitutional:       Appearance: Normal appearance. He is well-developed.  He is not components within normal limits    Narrative:     Performed at:  [address], [facility] [place] [facility] [employer]   Phone [phone]   MICROSCOPIC URINALYSIS - Abnormal; Notable for the following components:    Mucus, UA 4+ (*)     Bacteria, UA 2+ (*)     All other components within normal limits    Narrative:     Performed at:  [address], [facility] [place] [facility] [employer]   Phone [phone]   TROPONIN    Narrative:     Performed at:  [place] [address] [employer]   Phone [phone]   SAMPLE POSSIBLE BLOOD BANK TESTING    Narrative:     Performed at:  [address], [facility] [place] [facility] [employer]   Phone [phone]       All other labs were within normal range or not returned as of this dictation. EMERGENCY DEPARTMENT COURSE and DIFFERENTIAL DIAGNOSIS/MDM:   Vitals:    Vitals:    08/24/21 2210 08/24/21 2335 08/24/21 2345 08/24/21 2355   BP: (!) 175/82  (!) 178/83    Pulse: 71 77     Resp: 17 16     Temp:       TempSrc:       SpO2: 97% 96% 97% 97%               ED Course as of Aug 25 0206   Tue Aug 24, 2021   2109 Adult male who comes in because his wife thinks that he is having a stroke. Since yesterday they noticed that his blood pressure has been elevated and he has been somewhat confused which has been progressively worsening until today. At about 4 AM this morning he also had a headache and has been vomiting. He still continues to have a headache. He has had changes in his vision for about 2 to 3 weeks. Because the patient symptoms started more than 24 hours ago a stroke activation was not initiated however I do think he needs to be thoroughly evaluated therefore laboratory studies chest x-ray EKG CT head and CTA head and neck ordered.   Patient is placed on cardiac blood pressure and pulse oximetry monitoring.    [TD]   2110 Cardiac monitor is interpreted by myself shows normal sinus rhythm. [TD]   2110 NIH stroke scale was done upon my evaluation of the NIH okay 0. He has negative test of skew. [TD]   2141 Laboratory studies reviewed no significant abnormality besides mild leukocytosis with neutrophilic predominance. He does have bacteria but no other signs of urinary tract infection. [TD]   1905 Chest x-ray read and interpreted by the radiologist and is negative for any acute process. [TD]   2227 EKG reviewed myself and also shows no acute process. [TD]      ED Course User Index  [TD] Tommy Montaño MD     CT scan shows chronic findings. No acute process. Patient is reassessed the patient is sitting up on the bed he is dressed and ready to go. Patient states he feels much better. His blood pressure has trended down without any intervention here in the emergency room. The patient's son is now present and he states that the patient has been struggling with some memory issues for several weeks which prompted his visit to a neurologist.  Patient's son also mentioned that they were not aware that the patient has not been taking his blood pressure medications and he told his neurologist that he had not been using his CPAP machine which I think may be contributing to his mental decline. I explained our findings to the patient and his son. Based on history physical exam and diagnostic work-up I believe his low risk for serious life-threatening condition that would require admission or further work-up in the emergency room. I do recommend close follow-up with his primary care provider and possible neurology for reevaluation. The patient does have a PCP listed however the son is requesting referral to another in case he needs a second opinion. This is provided. CRITICAL CARE TIME   None       CONSULTS:  None    PROCEDURES:       Procedures    FINAL IMPRESSION      1.  Uncontrolled hypertension DISPOSITION/PLAN   DISPOSITION Decision To Discharge 08/25/2021 12:14:31 AM      PATIENT REFERREDTO:  Autumn Ponce DO  [address]  [phone]    Schedule an appointment as soon as possible for a visit in 1 day        DISCHARGEMEDICATIONS:  Discharge Medication List as of 8/25/2021 12:15 AM             (Please note that portions of this note were completed with a voice recognition program.  Efforts were made to edit the dictations but occasionally words are mis-transcribed.)    Cam Roman MD (electronically signed)  Attending Emergency Physician        Cam Roman MD  08/25/21 4800

## 2022-09-30 ENCOUNTER — HOSPITAL ENCOUNTER (INPATIENT)
Age: 72
LOS: 2 days | Discharge: HOME | DRG: 084 | End: 2022-10-02
Attending: INTERNAL MEDICINE | Admitting: INTERNAL MEDICINE
Payer: MEDICARE

## 2022-09-30 ENCOUNTER — APPOINTMENT (OUTPATIENT)
Dept: CT IMAGING | Age: 72
End: 2022-09-30
Payer: MEDICARE

## 2022-09-30 ENCOUNTER — APPOINTMENT (OUTPATIENT)
Dept: CT IMAGING | Age: 72
DRG: 084 | End: 2022-09-30
Attending: INTERNAL MEDICINE
Payer: MEDICARE

## 2022-09-30 ENCOUNTER — HOSPITAL ENCOUNTER (EMERGENCY)
Age: 72
Discharge: ANOTHER HOSPITAL | End: 2022-09-30
Attending: EMERGENCY MEDICINE
Payer: MEDICARE

## 2022-09-30 VITALS
WEIGHT: 270 LBS | SYSTOLIC BLOOD PRESSURE: 137 MMHG | RESPIRATION RATE: 15 BRPM | HEART RATE: 67 BPM | OXYGEN SATURATION: 98 % | DIASTOLIC BLOOD PRESSURE: 114 MMHG | BODY MASS INDEX: 36.57 KG/M2 | TEMPERATURE: 98 F | HEIGHT: 72 IN

## 2022-09-30 DIAGNOSIS — Z79.82 LONG-TERM USE OF ASPIRIN THERAPY: ICD-10-CM

## 2022-09-30 DIAGNOSIS — S06.33AA FOCAL HEMORRHAGIC CONTUSION OF CEREBRUM: Primary | ICD-10-CM
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
## 2022-09-30 DIAGNOSIS — S01.81XA LACERATION OF FOREHEAD, INITIAL ENCOUNTER: ICD-10-CM

## 2022-09-30 PROBLEM — G93.89 RIGHT TEMPORAL LOBE MASS: Status: ACTIVE | Noted: 2022-09-30

## 2022-09-30 LAB
A/G RATIO: 1.6 (ref 1.1–2.2)
ALBUMIN SERPL-MCNC: 4.2 G/DL (ref 3.4–5)
ALP BLD-CCNC: 89 U/L (ref 40–129)
ALT SERPL-CCNC: 7 U/L (ref 10–40)
AMPHETAMINE SCREEN, URINE: ABNORMAL
ANION GAP SERPL CALCULATED.3IONS-SCNC: 11 MMOL/L (ref 3–16)
AST SERPL-CCNC: 14 U/L (ref 15–37)
BARBITURATE SCREEN URINE: ABNORMAL
BASOPHILS ABSOLUTE: 0 K/UL (ref 0–0.2)
BASOPHILS RELATIVE PERCENT: 0.4 %
BENZODIAZEPINE SCREEN, URINE: ABNORMAL
BILIRUB SERPL-MCNC: 0.5 MG/DL (ref 0–1)
BILIRUBIN URINE: NEGATIVE
BLOOD, URINE: ABNORMAL
BUN BLDV-MCNC: 18 MG/DL (ref 7–20)
CALCIUM SERPL-MCNC: 9.8 MG/DL (ref 8.3–10.6)
CANNABINOID SCREEN URINE: POSITIVE
CHLORIDE BLD-SCNC: 101 MMOL/L (ref 99–110)
CLARITY: CLEAR
CO2: 24 MMOL/L (ref 21–32)
COCAINE METABOLITE SCREEN URINE: ABNORMAL
COLOR: YELLOW
CREAT SERPL-MCNC: 1.2 MG/DL (ref 0.8–1.3)
EKG ATRIAL RATE: 64 BPM
EKG DIAGNOSIS: NORMAL
EKG P AXIS: 73 DEGREES
EKG P-R INTERVAL: 150 MS
EKG Q-T INTERVAL: 432 MS
EKG QRS DURATION: 116 MS
EKG QTC CALCULATION (BAZETT): 445 MS
EKG R AXIS: 43 DEGREES
EKG T AXIS: 54 DEGREES
EKG VENTRICULAR RATE: 64 BPM
EOSINOPHILS ABSOLUTE: 0.2 K/UL (ref 0–0.6)
EOSINOPHILS RELATIVE PERCENT: 2.2 %
EPITHELIAL CELLS, UA: NORMAL /HPF (ref 0–5)
ETHANOL: NORMAL MG/DL (ref 0–0.08)
FENTANYL SCREEN, URINE: ABNORMAL
GFR AFRICAN AMERICAN: >60
GFR NON-AFRICAN AMERICAN: 59
GLUCOSE BLD-MCNC: 111 MG/DL (ref 70–99)
GLUCOSE URINE: NEGATIVE MG/DL
HCT VFR BLD CALC: 43.8 % (ref 40.5–52.5)
HEMOGLOBIN: 14.7 G/DL (ref 13.5–17.5)
KETONES, URINE: NEGATIVE MG/DL
LEUKOCYTE ESTERASE, URINE: NEGATIVE
LYMPHOCYTES ABSOLUTE: 2.6 K/UL (ref 1–5.1)
LYMPHOCYTES RELATIVE PERCENT: 24.8 %
Lab: ABNORMAL
MCH RBC QN AUTO: 31.1 PG (ref 26–34)
MCHC RBC AUTO-ENTMCNC: 33.6 G/DL (ref 31–36)
MCV RBC AUTO: 92.4 FL (ref 80–100)
METHADONE SCREEN, URINE: ABNORMAL
MICROSCOPIC EXAMINATION: YES
MONOCYTES ABSOLUTE: 0.8 K/UL (ref 0–1.3)
MONOCYTES RELATIVE PERCENT: 7.3 %
NEUTROPHILS ABSOLUTE: 6.9 K/UL (ref 1.7–7.7)
NEUTROPHILS RELATIVE PERCENT: 65.3 %
NITRITE, URINE: NEGATIVE
OPIATE SCREEN URINE: ABNORMAL
OXYCODONE URINE: ABNORMAL
PDW BLD-RTO: 13.4 % (ref 12.4–15.4)
PH UA: 6.5
PH UA: 6.5 (ref 5–8)
PHENCYCLIDINE SCREEN URINE: ABNORMAL
PLATELET # BLD: 185 K/UL (ref 135–450)
PMV BLD AUTO: 9.4 FL (ref 5–10.5)
POTASSIUM REFLEX MAGNESIUM: 3.8 MMOL/L (ref 3.5–5.1)
PROTEIN UA: NEGATIVE MG/DL
RBC # BLD: 4.74 M/UL (ref 4.2–5.9)
RBC UA: NORMAL /HPF (ref 0–4)
SODIUM BLD-SCNC: 136 MMOL/L (ref 136–145)
SPECIFIC GRAVITY UA: 1.01 (ref 1–1.03)
TOTAL PROTEIN: 6.8 G/DL (ref 6.4–8.2)
TROPONIN: <0.01 NG/ML
URINE REFLEX TO CULTURE: ABNORMAL
URINE TYPE: ABNORMAL
UROBILINOGEN, URINE: 0.2 E.U./DL
WBC # BLD: 10.5 K/UL (ref 4–11)
WBC UA: NORMAL /HPF (ref 0–5)

## 2022-09-30 PROCEDURE — 84484 ASSAY OF TROPONIN QUANT: CPT

## 2022-09-30 PROCEDURE — 82077 ASSAY SPEC XCP UR&BREATH IA: CPT

## 2022-09-30 PROCEDURE — 96375 TX/PRO/DX INJ NEW DRUG ADDON: CPT

## 2022-09-30 PROCEDURE — 70450 CT HEAD/BRAIN W/O DYE: CPT

## 2022-09-30 PROCEDURE — 80307 DRUG TEST PRSMV CHEM ANLYZR: CPT

## 2022-09-30 PROCEDURE — 96365 THER/PROPH/DIAG IV INF INIT: CPT

## 2022-09-30 PROCEDURE — 6370000000 HC RX 637 (ALT 250 FOR IP)

## 2022-09-30 PROCEDURE — 2580000003 HC RX 258

## 2022-09-30 PROCEDURE — 2580000003 HC RX 258: Performed by: EMERGENCY MEDICINE

## 2022-09-30 PROCEDURE — 93010 ELECTROCARDIOGRAM REPORT: CPT | Performed by: INTERNAL MEDICINE

## 2022-09-30 PROCEDURE — 12011 RPR F/E/E/N/L/M 2.5 CM/<: CPT

## 2022-09-30 PROCEDURE — 93005 ELECTROCARDIOGRAM TRACING: CPT | Performed by: EMERGENCY MEDICINE

## 2022-09-30 PROCEDURE — 99285 EMERGENCY DEPT VISIT HI MDM: CPT

## 2022-09-30 PROCEDURE — 85025 COMPLETE CBC W/AUTO DIFF WBC: CPT

## 2022-09-30 PROCEDURE — 2060000000 HC ICU INTERMEDIATE R&B

## 2022-09-30 PROCEDURE — 6360000002 HC RX W HCPCS: Performed by: EMERGENCY MEDICINE

## 2022-09-30 PROCEDURE — 81001 URINALYSIS AUTO W/SCOPE: CPT

## 2022-09-30 PROCEDURE — 80053 COMPREHEN METABOLIC PANEL: CPT

## 2022-09-30 RX ORDER — SODIUM CHLORIDE 9 MG/ML
INJECTION, SOLUTION INTRAVENOUS PRN
Status: DISCONTINUED | OUTPATIENT
Start: 2022-09-30 | End: 2022-10-02 | Stop reason: HOSPADM

## 2022-09-30 RX ORDER — GABAPENTIN 300 MG/1
300 CAPSULE ORAL DAILY
Status: DISCONTINUED | OUTPATIENT
Start: 2022-09-30 | End: 2022-09-30

## 2022-09-30 RX ORDER — SODIUM CHLORIDE 0.9 % (FLUSH) 0.9 %
5-40 SYRINGE (ML) INJECTION EVERY 12 HOURS SCHEDULED
Status: DISCONTINUED | OUTPATIENT
Start: 2022-09-30 | End: 2022-10-02 | Stop reason: HOSPADM

## 2022-09-30 RX ORDER — POLYETHYLENE GLYCOL 3350 17 G/17G
17 POWDER, FOR SOLUTION ORAL DAILY PRN
Status: DISCONTINUED | OUTPATIENT
Start: 2022-09-30 | End: 2022-10-02 | Stop reason: HOSPADM

## 2022-09-30 RX ORDER — SODIUM CHLORIDE 0.9 % (FLUSH) 0.9 %
5-40 SYRINGE (ML) INJECTION PRN
Status: DISCONTINUED | OUTPATIENT
Start: 2022-09-30 | End: 2022-10-02 | Stop reason: HOSPADM

## 2022-09-30 RX ORDER — FLUOXETINE 10 MG/1
10 CAPSULE ORAL DAILY
Status: DISCONTINUED | OUTPATIENT
Start: 2022-09-30 | End: 2022-10-02 | Stop reason: HOSPADM

## 2022-09-30 RX ORDER — FOLIC ACID 1 MG/1
1 TABLET ORAL DAILY
COMMUNITY

## 2022-09-30 RX ORDER — ASPIRIN 81 MG/1
81 TABLET, CHEWABLE ORAL DAILY
Status: ON HOLD | COMMUNITY
End: 2022-10-02 | Stop reason: HOSPADM

## 2022-09-30 RX ORDER — AMLODIPINE BESYLATE 5 MG/1
5 TABLET ORAL DAILY
Status: DISCONTINUED | OUTPATIENT
Start: 2022-10-01 | End: 2022-10-02 | Stop reason: HOSPADM

## 2022-09-30 RX ORDER — DEXAMETHASONE SODIUM PHOSPHATE 4 MG/ML
4 INJECTION, SOLUTION INTRA-ARTICULAR; INTRALESIONAL; INTRAMUSCULAR; INTRAVENOUS; SOFT TISSUE EVERY 6 HOURS
Status: DISCONTINUED | OUTPATIENT
Start: 2022-09-30 | End: 2022-09-30 | Stop reason: HOSPADM

## 2022-09-30 RX ORDER — VITAMIN B COMPLEX
1000 TABLET ORAL DAILY
Status: DISCONTINUED | OUTPATIENT
Start: 2022-09-30 | End: 2022-10-02 | Stop reason: HOSPADM

## 2022-09-30 RX ORDER — ACETAMINOPHEN 325 MG/1
650 TABLET ORAL EVERY 6 HOURS PRN
Status: DISCONTINUED | OUTPATIENT
Start: 2022-09-30 | End: 2022-10-02 | Stop reason: HOSPADM

## 2022-09-30 RX ORDER — LABETALOL HYDROCHLORIDE 5 MG/ML
10 INJECTION, SOLUTION INTRAVENOUS EVERY 6 HOURS PRN
Status: DISCONTINUED | OUTPATIENT
Start: 2022-09-30 | End: 2022-10-02 | Stop reason: HOSPADM

## 2022-09-30 RX ORDER — ACETAMINOPHEN 650 MG/1
650 SUPPOSITORY RECTAL EVERY 6 HOURS PRN
Status: DISCONTINUED | OUTPATIENT
Start: 2022-09-30 | End: 2022-10-02 | Stop reason: HOSPADM

## 2022-09-30 RX ORDER — ONDANSETRON 4 MG/1
4 TABLET, ORALLY DISINTEGRATING ORAL EVERY 8 HOURS PRN
Status: DISCONTINUED | OUTPATIENT
Start: 2022-09-30 | End: 2022-10-02 | Stop reason: HOSPADM

## 2022-09-30 RX ORDER — AMLODIPINE BESYLATE 5 MG/1
5 TABLET ORAL DAILY
COMMUNITY

## 2022-09-30 RX ORDER — PANTOPRAZOLE SODIUM 20 MG/1
20 TABLET, DELAYED RELEASE ORAL
Status: DISCONTINUED | OUTPATIENT
Start: 2022-10-01 | End: 2022-10-02 | Stop reason: HOSPADM

## 2022-09-30 RX ORDER — LABETALOL HYDROCHLORIDE 5 MG/ML
10 INJECTION, SOLUTION INTRAVENOUS EVERY 6 HOURS
Status: DISCONTINUED | OUTPATIENT
Start: 2022-09-30 | End: 2022-09-30

## 2022-09-30 RX ORDER — ONDANSETRON 2 MG/ML
4 INJECTION INTRAMUSCULAR; INTRAVENOUS EVERY 6 HOURS PRN
Status: DISCONTINUED | OUTPATIENT
Start: 2022-09-30 | End: 2022-10-02 | Stop reason: HOSPADM

## 2022-09-30 RX ORDER — LOSARTAN POTASSIUM AND HYDROCHLOROTHIAZIDE 12.5; 1 MG/1; MG/1
1 TABLET ORAL DAILY
Status: DISCONTINUED | OUTPATIENT
Start: 2022-10-01 | End: 2022-10-02 | Stop reason: HOSPADM

## 2022-09-30 RX ORDER — ATORVASTATIN CALCIUM 80 MG/1
80 TABLET, FILM COATED ORAL NIGHTLY
Status: DISCONTINUED | OUTPATIENT
Start: 2022-09-30 | End: 2022-10-02 | Stop reason: HOSPADM

## 2022-09-30 RX ORDER — FOLIC ACID 1 MG/1
1 TABLET ORAL DAILY
Status: DISCONTINUED | OUTPATIENT
Start: 2022-09-30 | End: 2022-10-02 | Stop reason: HOSPADM

## 2022-09-30 RX ADMIN — SODIUM CHLORIDE 1000 MG: 9 INJECTION, SOLUTION INTRAVENOUS at 12:31

## 2022-09-30 RX ADMIN — Medication 1000 UNITS: at 17:40

## 2022-09-30 RX ADMIN — CARBIDOPA AND LEVODOPA 1 TABLET: 25; 100 TABLET ORAL at 21:46

## 2022-09-30 RX ADMIN — FLUOXETINE 10 MG: 10 CAPSULE ORAL at 17:40

## 2022-09-30 RX ADMIN — SODIUM CHLORIDE, PRESERVATIVE FREE 10 ML: 5 INJECTION INTRAVENOUS at 21:46

## 2022-09-30 RX ADMIN — DEXAMETHASONE SODIUM PHOSPHATE 4 MG: 4 INJECTION INTRA-ARTICULAR; INTRALESIONAL; INTRAMUSCULAR; INTRAVENOUS; SOFT TISSUE at 12:34

## 2022-09-30 RX ADMIN — FOLIC ACID 1 MG: 1 TABLET ORAL at 17:40

## 2022-09-30 RX ADMIN — ACETAMINOPHEN 650 MG: 325 TABLET, FILM COATED ORAL at 16:50

## 2022-09-30 RX ADMIN — ATORVASTATIN CALCIUM 80 MG: 80 TABLET, FILM COATED ORAL at 21:45

## 2022-09-30 ASSESSMENT — PAIN - FUNCTIONAL ASSESSMENT: PAIN_FUNCTIONAL_ASSESSMENT: NONE - DENIES PAIN

## 2022-09-30 ASSESSMENT — VISUAL ACUITY: OU: 1

## 2022-09-30 NOTE — ED NOTES
Patient identified as a positive fall risk on the ED triage fall screening. Patient placed in fall precautions which includes:  yellow fall risk bracelet on wrist and yellow socks on feet. Patient instructed on importance of not getting out of bed or ambulating without assistance for safety. Pt verbalized understanding.       Kimberly Landis RN  09/30/22 2289

## 2022-09-30 NOTE — PROGRESS NOTES
Family requesting to be notified if physician is rounding on patient and family is not in the room. Family requested a note be written.

## 2022-09-30 NOTE — H&P
Internal Medicine  PGY-1  History & Physical      CC: Fall    History Obtained From:  patient, family at bedside, electronic medical record    HISTORY OF PRESENT ILLNESS:  Coty Hussein is a 67 y.o. male with PMH as below notable for parkinson's disease, alzheimer's disease, hypertension, hyperlipidemia, and GERD, who presented after a fall at home this morning. He had 2 falls the night before. Patient does not remember what happened to precipitate the fall but does remember getting out of bed to go to the bathroom when the fall occurred. He is not sure if he lost consciousness and there were no witnesses. Wife states he has been diagnosed with parkinson's disease in the past. Further, she stated that he \"falls at least once a week. \" Of note, patient has a laceration that has been stitched on the left side of his forehead. Laceration was not bleeding upon examination. Denies HA, changes to vision/hearing, chest pain, palpitations, SOB, cough, abdominal pain, or changes to urinating and bowel movements. Not taking any anti-coagulants except Aspirin 81mg. Follows with Neurology at [facility] and was recently diagnosed with possible alzheimer's secondary to lewy body dementia. Recently diagnosed 08/2022 by Neuropsychiatry (Dr. Cisco Graham) at [facility]. Takes Sinamet, last visit 09/28 dose was increased to 50-200mg TID. [facility] ED Course:   Vitals were stable and all labs were normal upon review. CT head w/o contrast found focal nodular hyperdensity in the right temporal lobe, suspicious for hemorrhagic contusion. Also, found atrophy and small-vessel ischemic change. CT HEAD WO CONTRAST   Final Result      1. Focal intracerebral hemorrhage within the right temporal lobe, unchanged. 2. Right temporal encephalomalacia. 3. Mild cortical atrophy. MRI BRAIN W WO CONTRAST    (Results Pending)       Patient was admitted for further workup and management of possible intracranial hemorrhage.     Past Medical History:        Diagnosis Date    Acid reflux     BP (high blood pressure)     Chronic back pain 2016    [place]     Depression     Hyperlipidemia     Neuropathy     Sleep apnea     uses CPAP       Past Surgical History:        Procedure Laterality Date    ANKLE FRACTURE SURGERY Right     KNEE SURGERY Bilateral     [place] REVISE MEDIAN N/CARPAL TUNNEL SURG Right 9/6/2018    RIGHT CARPAL TUNNEL RELEASE performed by Nelly Holder MD at [address] [facility] Right     SHOULDER SURGERY Bilateral        Medications Priorto Admission:    Medications Prior to Admission: folic acid (FOLVITE) 1 MG tablet, Take 1 mg by mouth daily  amLODIPine (NORVASC) 5 MG tablet, Take 5 mg by mouth daily  vitamin D (CHOLECALCIFEROL) 25 MCG (1000 UT) TABS tablet, Take 1,000 Units by mouth daily  aspirin 81 MG chewable tablet, Take 81 mg by mouth daily  CARBIDOPA-LEVODOPA EN, Take  mg by mouth 3 times daily  LOSARTAN POTASSIUM-HCTZ PO, Take 100 mg by mouth daily  omeprazole (PRILOSEC) 10 MG delayed release capsule, Take 20 mg by mouth daily  atorvastatin (LIPITOR) 80 MG tablet, Take 80 mg by mouth daily  gabapentin (NEURONTIN) 300 MG capsule, Take 300 mg by mouth daily. . (Patient not taking: Reported on 9/30/2022)  celecoxib (CELEBREX) 200 MG capsule, Take 200 mg by mouth daily. FLUoxetine (PROZAC) 10 MG tablet, Take 10 mg by mouth daily. Allergies:  Adhesive tape, Oxycodone-acetaminophen, and Percodan [oxycodone-aspirin]    Social History:    reports that he has been smoking cigarettes. He has a 24.50 pack-year smoking history. He has never used smokeless tobacco. He reports current drug use. Drug: Marijuana Jurline Mathieu). He reports that he does not drink alcohol. Family History:   No family history on file.   Family History reviewed with patient, and does not pertain and non-contributory to the current illness  Review of Systems    ROS: A 10 point review of systems was conducted, significant findings as noted in HPI. Physical exam:      Vitals:    09/30/22 1420   BP: 126/86   Pulse: 60   Resp: 18   Temp: 97.4 °F (36.3 °C)   SpO2: 96%       Physical Exam  Constitutional:       General: He is not in acute distress. HENT:      Head: Laceration present. Comments: Stitched laceration on left side of forehead. Eyes:      General: Vision grossly intact. Extraocular Movements: Extraocular movements intact. Conjunctiva/sclera: Conjunctivae normal.      Pupils: Pupils are equal, round, and reactive to light. Visual Fields: Right eye visual fields normal and left eye visual fields normal.   Cardiovascular:      Rate and Rhythm: Normal rate and regular rhythm. Chest Wall: No thrill. Heart sounds: Normal heart sounds, S1 normal and S2 normal.     No friction rub. No gallop. Pulmonary:      Effort: Pulmonary effort is normal.      Breath sounds: Normal breath sounds and air entry. Abdominal:      General: Abdomen is flat. Bowel sounds are normal. There is no distension. Palpations: Abdomen is soft. Tenderness: There is no abdominal tenderness. Musculoskeletal:      Cervical back: Normal range of motion. Neurological:      General: No focal deficit present. Mental Status: He is alert and oriented to person, place, and time. Cranial Nerves: Cranial nerves 2-12 are intact. Sensory: Sensation is intact. Motor: Tremor present. Coordination: Coordination is intact. Romberg sign negative. Finger-Nose-Finger Test and Heel to [facility] Test normal. Rapid alternating movements normal.   Psychiatric:         Behavior: Behavior is cooperative.        DATA:    Labs:  CBC:   Recent Labs     09/30/22  0830   WBC 10.5   HGB 14.7   HCT 43.8          BMP:   Recent Labs     09/30/22  0830      K 3.8      CO2 24   BUN 18   CREATININE 1.2   GLUCOSE 111*     LFT's:   Recent Labs     09/30/22  0830   AST 14*   ALT 7*   BILITOT 0.5 ALKPHOS 89     Troponin:   Recent Labs     09/30/22  0830   TROPONINI <0.01     BNP:No results for input(s): BNP in the last 72 hours. ABGs: No results for input(s): PHART, OYZ0SRB, PO2ART in the last 72 hours. INR: No results for input(s): INR in the last 72 hours. U/A:  Recent Labs     09/30/22  1017   COLORU Yellow   PHUR 6.5  6.5   WBCUA 3-5   RBCUA None seen   CLARITYU Clear   SPECGRAV 1.010   LEUKOCYTESUR Negative   UROBILINOGEN 0.2   BILIRUBINUR Negative   BLOODU TRACE-INTACT*   GLUCOSEU Negative       CT HEAD WO CONTRAST   Final Result      1. Focal intracerebral hemorrhage within the right temporal lobe, unchanged. 2. Right temporal encephalomalacia. 3. Mild cortical atrophy. MRI BRAIN W WO CONTRAST    (Results Pending)       ASSESSMENT AND PLAN:  German Justin is a 67 y.o. male with PMH as below notable for parkinson's disease, alzheimer's disease, hypertension, hyperlipidemia, and GERD, who presented after a fall at home. Right Temporal Lobe Hemorrhagic Contusion 2/2 to unwitnessed fall, likely in setting of Parkinson's. Stitched laceration on left side of forehead after fall. Wife states that he falls \"at least once per week. \" Patient reportedly fell 2 times the night before this incident. -CT head w/o contrast found focal nodular hyperdensity in the right temporal lobe, suspicious for hemorrhagic contusion. Also, found atrophy and small-vessel ischemic change  -Neuro surgery consulted  -Neuro checks Q4  -Keppra 500mg BID  -Continue to monitor for focal neuro deficits  -F/u MRI w wo contrast  -F/u CT wo contrast - no change from prior exam  -NPO - diet after MRI read and deemed stable    Recently diagnosed Parkinson's, with possible Alzheimers vs Lewy Body Dementia  Follows with neurology, Gaby Reardon, LEIL[facility] at [facility].   Per neuro, patient began having resting tremors 2 to 3 years ago and was ultimately diagnosed just recently after neuropsychiatric testing with Dr. Arnulfo Max on 08/2022.  - On home Sinemet  mg TID    Chronic Conditions:  HTN - Will start Home Norvasc, Hyzaar tomorrow  HLD - continue atorvastatin  GERD - continue pantoprazole  Depression - continue fluoxetine    Will discuss with attending physician Dr. Gigi Nj    Code Status: Full Code  FEN: Diet NPO  PPX:  SCDs  DISPO: GMF    Note was partially written by medical student Carmela Saul, MS3). Adjustments were made and reviewed by me. Roosvelt Nageotte, MD  9/30/2022  6:16 PM    I saw the patient independently from the resident . I discussed the care with the resident. I personally reviewed the HPI, PH, FH, SH, ROS and medications. I repeated pertinent portions of the examination and reviewed the relevant imaging and laboratory data. I agree with the findings, assessment and plan as documented.  addition to: Plan as above

## 2022-09-30 NOTE — ED PROVIDER NOTES
Emergency Physician Note        Note Open Time: 10:21 AM EDT    Chief Complaint  Fall (Pt dx with parkinson and  alzheimer's disease wife state that he has been off centered)       History of Present Illness  Cy Nava is a 67 y.o. male who presents to the ED for fall. Patient does not know exactly what happened. He states he was in his bedroom and he just got out of bed and was headed to the bathroom when he fell. He is unsure if he lost consciousness. Patient's wife was in bed and did not witness the fall. Tetanus unknown. Bleeding controlled prior to arrival.  Patient was recently diagnosed with Parkinson's according to the wife. 10 systems reviewed, pertinent positives per HPI otherwise noted to be negative    I have reviewed the following from the nursing documentation:      Prior to Admission medications    Medication Sig Start Date End Date Taking? Authorizing Provider   CARBIDOPA-LEVODOPA EN by Enteral route   Yes Historical Provider, MD   LOSARTAN POTASSIUM-HCTZ PO Take by mouth daily    Historical Provider, MD   omeprazole (PRILOSEC) 10 MG delayed release capsule Take 10 mg by mouth daily    Historical Provider, MD   atorvastatin (LIPITOR) 80 MG tablet Take 80 mg by mouth daily    Historical Provider, MD   gabapentin (NEURONTIN) 300 MG capsule Take 300 mg by mouth daily. Prakash Paul Historical Provider, MD   celecoxib (CELEBREX) 200 MG capsule Take 200 mg by mouth daily. Historical Provider, MD   FLUoxetine (PROZAC) 10 MG tablet Take 10 mg by mouth daily.     Historical Provider, MD       Allergies as of 09/30/2022 - Fully Reviewed 09/30/2022   Allergen Reaction Noted    Adhesive tape Other (See Comments) 08/30/2018    Oxycodone-acetaminophen Itching 05/02/2014    Percodan [oxycodone-aspirin] Itching 05/02/2014       Past Medical History:   Diagnosis Date    Acid reflux     BP (high blood pressure)     Chronic back pain 2016    [place]     Depression     Hyperlipidemia Neuropathy     Sleep apnea     uses CPAP        Surgical History:   Past Surgical History:   Procedure Laterality Date    ANKLE FRACTURE SURGERY Right     KNEE SURGERY Bilateral     [place] REVISE MEDIAN N/CARPAL TUNNEL SURG Right 2018    RIGHT CARPAL TUNNEL RELEASE performed by Pratima Sinha MD at [address] [facility] Right     SHOULDER SURGERY Bilateral         Family History:  History reviewed. No pertinent family history. Social History     Socioeconomic History    Marital status:      Spouse name: Not on file    Number of children: Not on file    Years of education: Not on file    Highest education level: Not on file   Occupational History    Not on file   Tobacco Use    Smoking status: Every Day     Packs/day: 0.50     Years: 49.00     Pack years: 24.50     Types: Cigarettes     Last attempt to quit: 2014     Years since quittin.4    Smokeless tobacco: Never   Substance and Sexual Activity    Alcohol use: No    Drug use: Yes     Types: Marijuana [place])    Sexual activity: Not on file   Other Topics Concern    Not on file   Social History Narrative    Not on file     Social Determinants of Health     Financial Resource Strain: Not on file   Food Insecurity: Not on file   Transportation Needs: Not on file   Physical Activity: Not on file   Stress: Not on file   Social Connections: Not on file   Intimate Partner Violence: Not on file   Housing Stability: Not on file       Nursing notes reviewed. ED Triage Vitals [22 0755]   Enc Vitals Group      /89      Heart Rate 68      Resp 16      Temp 98 °F (36.7 °C)      Temp Source Oral      SpO2 97 %      Weight 270 lb (122.5 kg)      Height 6' (1.829 m)      Head Circumference       Peak Flow       Pain Score       Pain Loc       Pain Edu? Excl. in [address]? GENERAL:  Awake, alert. Well developed, well nourished with no apparent distress.    HENT:  Normocephalic, left forehead laceration, no depressed skull fracture or hematoma, moist mucous membranes. EYES:  Pupils equal round and reactive to light, Conjunctiva normal, extraocular movements normal.  NECK:  No meningeal signs, Supple. No tenderness. CHEST:  Regular rate and rhythm, chest wall non-tender. LUNGS:  Clear to auscultation bilaterally. ABDOMEN:  Soft, non-tender, no rebound, rigidity or guarding, non-distended, normal bowel sounds. No costovertebral angle tenderness to palpation. BACK:  No tenderness. No step-offs, contusions or abrasions throughout the cervical, thoracic or lumbar spine. EXTREMITIES:  Normal range of motion, no edema, no bony tenderness, no deformity, distal pulses present. Remainder of axial and appendicular skeleton NT exc as noted. Full active ROM as well at all jts exc as noted. SKIN: Warm, dry and intact. NEUROLOGIC: Normal mental status. Moving all extremities to command. LABS and DIAGNOSTIC RESULTS  EKG  The Ekg interpreted by me shows  normal sinus rhythm with a rate of 64  Axis is   Normal  QTc is  normal  Intervals and Durations are unremarkable. ST Segments: normal  Delta waves, Brugada Syndrome, and Short MN are not present. No significant change from prior EKG dated 8/24/21    RADIOLOGY  X-RAYS:  I have reviewed radiologic plain film image(s). ALL OTHER NON-PLAIN FILM IMAGES SUCH AS CT, ULTRASOUND AND MRI HAVE BEEN READ BY THE RADIOLOGIST. CT Head W/O Contrast   Final Result   Focal nodular hyperdensity in the right temporal lobe, suspicious for   hemorrhagic contusion given the history of trauma      Atrophy and small-vessel ischemic change. Encephalomalacia right temporal   lobe is similar. Mild paranasal sinus disease      Results discussed with BALJINDER BABCOCK by Davina Carreon.  Karol Churchill MD at 8:39 am on   9/30/2022              LABS  Labs Reviewed   URINALYSIS WITH REFLEX TO CULTURE - Abnormal; Notable for the following components:       Result Value    Blood, Urine TRACE-INTACT (*)     All other components within normal limits   COMPREHENSIVE METABOLIC PANEL W/ REFLEX TO MG FOR LOW K - Abnormal; Notable for the following components:    Glucose 111 (*)     GFR Non- 59 (*)     ALT 7 (*)     AST 14 (*)     All other components within normal limits   URINE DRUG SCREEN - Abnormal; Notable for the following components:    Cannabinoid Scrn, Ur POSITIVE (*)     All other components within normal limits   TROPONIN   CBC WITH AUTO DIFFERENTIAL   ETHANOL   MICROSCOPIC URINALYSIS       PROCEDURES  Laceration cleaned and closed by the nurse practitioner. MEDICAL DECISION MAKING        Patient takes aspirin daily and has an apparent hemorrhagic contusion on CT scan. I spoke with the on-call nurse practitioner for neurosurgery, Arlene Lockett, and he recommends giving the patient 4 mg of Decadron every 6 hours and a dose of Keppra and transferred to [address] [place] [address] at [facility]..  Patient and family are agreeable with the plan. Patient maintained a stable neurologic status in my opinion while in the ER. The total Critical Care time is 45 minutes which excludes separately billable procedures. The critical care was concerning treatment for intracerebral hemorrhage and associated edema with Decadron and Keppra. This time is exclusive of any time documented by any other providers. I spoke with Dr. Heidi Philippe hospitalist. We thoroughly discussed the history, physical exam, laboratory and imaging studies, as well as, current course of treatment within the emergency department. Based upon that discussion, we've decided to transfer Letcher Schirmer to [facility], for further observation and evaluation of Nilda Schirmer current condition. As I have deemed necessary from their history, physical, and studies, I have considered and evaluated Letcher Schirmer for the following diagnoses:      CLINICAL IMPRESSION:  1. Focal hemorrhagic contusion of cerebrum ([address]    2. Fall, initial encounter    3. Long-term use of aspirin therapy    4. Laceration of forehead, initial encounter        BP (!) 137/114   Pulse 67   Temp 98 °F (36.7 °C)   Resp 15   Ht 6' (1.829 m)   Wt 270 lb (122.5 kg)   SpO2 98%   BMI 36.62 kg/m²         Disposition  Pt is in stable condition upon Transfer to [facility]. to step down unit. This chart was generated using the [address] [place] [address] dictation system. I created this record but it may contain dictation errors.           Manuel Demarco MD  09/30/22 1600

## 2022-09-30 NOTE — PLAN OF CARE
Consult Received    Assessment:  Patient is a 67 y.o. male w/ICH.   GCS 13-15 = Score 0  Volume (mL) less than 30 = Score 0  Infratentorial Origin No = Score 0  IVH No = Score 0  Age (years) less than 80 = Score 0  ICH Score:0    Plan:  Neurologic exams frequency:  - Floor: Q4H  For change in exam MUST contact neurosurgery team along with critical care or primary team  Repeat CT Head; OK to eat if hemorrhage stable  MRI Brain w wo to evaluate for etiology of hemorrhage  Keppra 500 mg  No full dose anticoag/antiplatelet  Full consult tomorrow      Electronically signed by: YANIRA Archibald CNP, APRN-CNP, 9/30/2022 4:22 PM  [phone]

## 2022-09-30 NOTE — PROGRESS NOTES
4 Eyes Admission Assessment     I agree as the admission nurse that 2 RN's have performed a thorough Head to Toe Skin Assessment on the patient. ALL assessment sites listed below have been assessed on admission. Areas assessed by both nurses:   [x]   Head, Face, and Ears   [x]   Shoulders, Back, and Chest  [x]   Arms, Elbows, and Hands   [x]   Coccyx, Sacrum, and Ischium  [x]   Legs, Feet, and Heels        Does the Patient have Skin Breakdown?    Laceration with stitches to L forehead, L knee abrasion, RLE abrasion           Wei Prevention initiated:  No   Wound Care Orders initiated:  No      [facility] nurse consulted for Pressure Injury (Stage 3,4, Unstageable, DTI, NWPT, and Complex wounds) or Wei score 18 or lower:  No      Nurse 1 eSignature: Electronically signed by Francheska Winter RN on 9/30/22 at 2:27 PM EDT    **SHARE this note so that the co-signing nurse is able to place an eSignature**    Nurse 2 eSignature: Electronically signed by Elisa Mahmood RN on 9/30/22 at 4:23 PM EDT

## 2022-09-30 NOTE — ED PROVIDER NOTES
[address] [facility]  ED  EMERGENCY DEPARTMENT ENCOUNTER        Pt Name: Jayashree Edward  MRN: [id number]  [place] 1950  Date of evaluation: 9/30/2022  Provider: YANIRA Higgins - CNP  PCP: Magdalene Chen    This patient was seen and evaluated by the attending physician Bam Cobb MD.    I have evaluated this patient. My supervising physician was available for consultation. CHIEF COMPLAINT       Chief Complaint   Patient presents with    Fall     Pt dx with parkinson and  alzheimer's disease wife state that he has been off centered         Interpretation [facility] Radiologist below, if available at the time of this note:    CT Head W/O Contrast   Final Result   Focal nodular hyperdensity in the right temporal lobe, suspicious for   hemorrhagic contusion given the history of trauma      Atrophy and small-vessel ischemic change. Encephalomalacia right temporal   lobe is similar. Mild paranasal sinus disease      Results discussed with BALJINDER BABCOCK by Uma Jameson. Kaleigh Campa MD at 8:39 am on   9/30/2022           CT Head W/O Contrast    Result Date: 9/30/2022  EXAMINATION: CT OF THE HEAD WITHOUT CONTRAST  9/30/2022 8:20 am TECHNIQUE: CT of the head was performed without the administration of intravenous contrast. Automated exposure control, iterative reconstruction, and/or weight based adjustment of the mA/kV was utilized to reduce the radiation dose to as low as reasonably achievable. COMPARISON: 08/24/2021 HISTORY: ORDERING SYSTEM PROVIDED HISTORY: fall, head injury TECHNOLOGIST PROVIDED HISTORY: Reason for exam:->fall, head injury Has a \"code stroke\" or \"stroke alert\" been called? ->No Decision Support Exception - unselect if not a suspected or confirmed emergency medical condition->Emergency Medical Condition (MA) Reason for Exam: fall this am- hit head, laceration to left forehead, denies LOC, c/o headache Relevant Medical/Surgical History: no h/o stroke or seizure FINDINGS: BRAIN/VENTRICLES: Ventricles are midline in position. No intracerebral masses are identified. No mass effect. No midline shift. Prominence of the sulci seen, compatible with atrophy. Encephalomalacia right temporal lobe is seen, similar to prior. However on today's study there is a focal nodular area of hyperdensity internally in the right temporal lobe measuring 8 mm. There is mild periventricular hypodensity seen. Scattered additional areas of hypodensity are seen throughout the frontal and parietal white matter. ORBITS: The visualized portion of the orbits demonstrate no acute abnormality. SINUSES: Mild mucosal thickening seen in the ethmoid air cells. Chronic bony wall thickening is seen in the right maxillary sinus. Mild mucosal thickening seen in the maxillary sinuses. Moderate mucosal thickening is seen in the mastoids on the right. SOFT TISSUES/SKULL:  No acute abnormality of the visualized skull or soft tissues. Focal nodular hyperdensity in the right temporal lobe, suspicious for hemorrhagic contusion given the history of trauma Atrophy and small-vessel ischemic change. Encephalomalacia right temporal lobe is similar. Mild paranasal sinus disease Results discussed with BALJINDER BABCOCK by Griselda Barbour.  Merlyn Araya MD at 8:39 am on 9/30/2022          PROCEDURES   Unless otherwise noted below, none     Lac Repair    Date/Time: 9/30/2022 11:43 AM  Performed by: YANIRA Lin - CNP  Authorized by: Angela Carcamo MD     Consent:     Consent obtained:  Verbal    Consent given by:  Patient    Risks discussed:  Poor wound healing and poor cosmetic result    Alternatives discussed:  No treatment  Universal protocol:     Procedure explained and questions answered to patient or proxy's satisfaction: yes      Relevant documents present and verified: yes      Imaging studies available: yes      Immediately prior to procedure, a time out was called: yes      Patient identity confirmed:  Verbally with patient  Anesthesia:     Anesthesia method:  Local infiltration    Local anesthetic:  Lidocaine 2% WITH epi  Laceration details:     Location:  Face    Face location:  Forehead    Length (cm):  1.5  Pre-procedure details:     Preparation:  Patient was prepped and draped in usual sterile fashion and imaging obtained to evaluate for foreign bodies  Exploration:     Limited defect created (wound extended): yes      Hemostasis achieved with:  Epinephrine    Imaging obtained comment:  Ct    Imaging outcome: foreign body not noted      Wound exploration: wound explored through full range of motion and entire depth of wound visualized      Wound extent: no areolar tissue violation noted, no fascia violation noted, no foreign bodies/material noted, no muscle damage noted, no nerve damage noted, no tendon damage noted, no underlying fracture noted and no vascular damage noted      Contaminated: no    Treatment:     Area cleansed with:  Chlorhexidine    Amount of cleaning:  Extensive    Irrigation solution:  Sterile saline    Irrigation method:  Pressure wash    Debridement:  None    Undermining:  None    Scar revision: no    Skin repair:     Repair method:  Sutures    Suture size:  5-0    Suture material:  Prolene    Suture technique:  Simple interrupted    Number of sutures:  6  Approximation:     Approximation:  Close  Repair type:     Repair type:  Simple  Post-procedure details:     Dressing:  Open (no dressing)    Procedure completion:  Tolerated well, no immediate complications      EMERGENCY DEPARTMENT COURSE and DIFFERENTIALDIAGNOSIS/MDM:   Vitals:    Vitals:    09/30/22 0755   BP: 130/89   Pulse: 68   Resp: 16   Temp: 98 °F (36.7 °C)   TempSrc: Oral   SpO2: 97%   Weight: 270 lb (122.5 kg)   Height: 6' (1.829 m)       Patient was given thefollowing medications:  Medications   levETIRAcetam (KEPPRA) 1,000 mg in sodium chloride 0.9 % 100 mL IVPB (has no administration in time range)   Dexamethasone Sodium Phosphate injection 4 mg (has no administration in time range)       PDMP Monitoring:    Last PDMP [place] as Reviewed [facility]):  Review User Review Instant Review Result            Urine Drug Screenings (1 yr)       Urine Drug Screen  Collected: 9/30/2022 10:17 AM (Preliminary result)                  Medication Contract and Consent for Opioid Use Documents Filed        No documents found                    Vitals:    09/30/22 0755   BP: 130/89   Pulse: 68   Resp: 16   Temp: 98 °F (36.7 °C)   SpO2: 97%        I completed laceration repair on this patient only. I did not assess or evaluate the patient otherwise. FINAL IMPRESSION      1. Focal hemorrhagic contusion of cerebrum ([facility] [address]    2. Fall, initial encounter    3. Long-term use of aspirin therapy    4.  Laceration of forehead, initial encounter               (Please note that portions ofthis note were completed with a voice recognition program.  Efforts were made to edit the dictations but occasionally words are mis-transcribed.)    YANIRA Petersen CNP (electronically signed)       YANIRA Petersen CNP  09/30/22 1145

## 2022-09-30 NOTE — ED NOTES
1000- called neurosurgery for consult per Dr. Kaushal Fay   RE: hemorrhagic contusion  1003- Nathan Ortiz called back and spoke with Dr. Dirk Srivastava  09/30/22 1008

## 2022-09-30 NOTE — ED NOTES
Pt came in with wife pt not walking with a steady gait pt placed in a WC. Pt wife state that for the last 2 nights pt has fallen and hit head. Pt with a head lac to left forehead that bleeding is controled. Wife also state that in the last couple of weeks pt has been dx with alzheimer and parkinson disease. Pt is awake alert however repeats questions forgetting answers or forgetting that he ask them. Report given to [facility] nurse for room 2303. Pt transferred per squad.       Marlys Levi RN  09/30/22 8335

## 2022-10-01 ENCOUNTER — APPOINTMENT (OUTPATIENT)
Dept: MRI IMAGING | Age: 72
DRG: 084 | End: 2022-10-01
Attending: INTERNAL MEDICINE
Payer: MEDICARE

## 2022-10-01 LAB
A/G RATIO: 1.5 (ref 1.1–2.2)
ALBUMIN SERPL-MCNC: 3.7 G/DL (ref 3.4–5)
ALP BLD-CCNC: 84 U/L (ref 40–129)
ALT SERPL-CCNC: <5 U/L (ref 10–40)
ANION GAP SERPL CALCULATED.3IONS-SCNC: 13 MMOL/L (ref 3–16)
APTT: 28.5 SEC (ref 23–34.3)
AST SERPL-CCNC: 11 U/L (ref 15–37)
BASOPHILS ABSOLUTE: 0 K/UL (ref 0–0.2)
BASOPHILS RELATIVE PERCENT: 0.1 %
BILIRUB SERPL-MCNC: 0.5 MG/DL (ref 0–1)
BUN BLDV-MCNC: 28 MG/DL (ref 7–20)
CALCIUM SERPL-MCNC: 9.6 MG/DL (ref 8.3–10.6)
CHLORIDE BLD-SCNC: 104 MMOL/L (ref 99–110)
CO2: 21 MMOL/L (ref 21–32)
CREAT SERPL-MCNC: 1 MG/DL (ref 0.8–1.3)
EOSINOPHILS ABSOLUTE: 0 K/UL (ref 0–0.6)
EOSINOPHILS RELATIVE PERCENT: 0 %
GFR AFRICAN AMERICAN: >60
GFR NON-AFRICAN AMERICAN: >60
GLUCOSE BLD-MCNC: 115 MG/DL (ref 70–99)
HCT VFR BLD CALC: 39.1 % (ref 40.5–52.5)
HEMOGLOBIN: 13.3 G/DL (ref 13.5–17.5)
INR BLD: 1.17 (ref 0.87–1.14)
LYMPHOCYTES ABSOLUTE: 0.9 K/UL (ref 1–5.1)
LYMPHOCYTES RELATIVE PERCENT: 8.9 %
MCH RBC QN AUTO: 31.3 PG (ref 26–34)
MCHC RBC AUTO-ENTMCNC: 34.1 G/DL (ref 31–36)
MCV RBC AUTO: 91.7 FL (ref 80–100)
MONOCYTES ABSOLUTE: 0.5 K/UL (ref 0–1.3)
MONOCYTES RELATIVE PERCENT: 4.3 %
NEUTROPHILS ABSOLUTE: 9.2 K/UL (ref 1.7–7.7)
NEUTROPHILS RELATIVE PERCENT: 86.7 %
PDW BLD-RTO: 13.3 % (ref 12.4–15.4)
PLATELET # BLD: 172 K/UL (ref 135–450)
PMV BLD AUTO: 9.5 FL (ref 5–10.5)
POTASSIUM REFLEX MAGNESIUM: 3.7 MMOL/L (ref 3.5–5.1)
PROTHROMBIN TIME: 14.8 SEC (ref 11.7–14.5)
RBC # BLD: 4.26 M/UL (ref 4.2–5.9)
SODIUM BLD-SCNC: 138 MMOL/L (ref 136–145)
TOTAL PROTEIN: 6.2 G/DL (ref 6.4–8.2)
WBC # BLD: 10.6 K/UL (ref 4–11)

## 2022-10-01 PROCEDURE — 2060000000 HC ICU INTERMEDIATE R&B

## 2022-10-01 PROCEDURE — 2580000003 HC RX 258: Performed by: NURSE PRACTITIONER

## 2022-10-01 PROCEDURE — 85025 COMPLETE CBC W/AUTO DIFF WBC: CPT

## 2022-10-01 PROCEDURE — 6360000002 HC RX W HCPCS: Performed by: NURSE PRACTITIONER

## 2022-10-01 PROCEDURE — 6370000000 HC RX 637 (ALT 250 FOR IP)

## 2022-10-01 PROCEDURE — A9579 GAD-BASE MR CONTRAST NOS,1ML: HCPCS | Performed by: NURSE PRACTITIONER

## 2022-10-01 PROCEDURE — 36415 COLL VENOUS BLD VENIPUNCTURE: CPT

## 2022-10-01 PROCEDURE — 85730 THROMBOPLASTIN TIME PARTIAL: CPT

## 2022-10-01 PROCEDURE — 2580000003 HC RX 258

## 2022-10-01 PROCEDURE — 70553 MRI BRAIN STEM W/O & W/DYE: CPT

## 2022-10-01 PROCEDURE — 80053 COMPREHEN METABOLIC PANEL: CPT

## 2022-10-01 PROCEDURE — 85610 PROTHROMBIN TIME: CPT

## 2022-10-01 PROCEDURE — 6360000004 HC RX CONTRAST MEDICATION: Performed by: NURSE PRACTITIONER

## 2022-10-01 RX ADMIN — FOLIC ACID 1 MG: 1 TABLET ORAL at 08:23

## 2022-10-01 RX ADMIN — SODIUM CHLORIDE 500 MG: 9 INJECTION, SOLUTION INTRAVENOUS at 00:14

## 2022-10-01 RX ADMIN — FLUOXETINE 10 MG: 10 CAPSULE ORAL at 08:23

## 2022-10-01 RX ADMIN — LOSARTAN POTASSIUM AND HYDROCHLOROTHIAZIDE 1 TABLET: 12.5; 1 TABLET ORAL at 08:24

## 2022-10-01 RX ADMIN — SODIUM CHLORIDE, PRESERVATIVE FREE 10 ML: 5 INJECTION INTRAVENOUS at 08:24

## 2022-10-01 RX ADMIN — ACETAMINOPHEN 650 MG: 325 TABLET, FILM COATED ORAL at 02:17

## 2022-10-01 RX ADMIN — SODIUM CHLORIDE 500 MG: 9 INJECTION, SOLUTION INTRAVENOUS at 08:30

## 2022-10-01 RX ADMIN — SODIUM CHLORIDE, PRESERVATIVE FREE 10 ML: 5 INJECTION INTRAVENOUS at 20:40

## 2022-10-01 RX ADMIN — Medication 1000 UNITS: at 08:23

## 2022-10-01 RX ADMIN — CARBIDOPA AND LEVODOPA 1 TABLET: 25; 100 TABLET ORAL at 20:40

## 2022-10-01 RX ADMIN — GADOTERIDOL 20 ML: 279.3 INJECTION, SOLUTION INTRAVENOUS at 01:55

## 2022-10-01 RX ADMIN — SODIUM CHLORIDE: 9 INJECTION, SOLUTION INTRAVENOUS at 08:29

## 2022-10-01 RX ADMIN — CARBIDOPA AND LEVODOPA 1 TABLET: 25; 100 TABLET ORAL at 08:23

## 2022-10-01 RX ADMIN — ATORVASTATIN CALCIUM 80 MG: 80 TABLET, FILM COATED ORAL at 20:40

## 2022-10-01 RX ADMIN — SODIUM CHLORIDE: 9 INJECTION, SOLUTION INTRAVENOUS at 20:39

## 2022-10-01 RX ADMIN — CARBIDOPA AND LEVODOPA 1 TABLET: 25; 100 TABLET ORAL at 15:03

## 2022-10-01 RX ADMIN — AMLODIPINE BESYLATE 5 MG: 5 TABLET ORAL at 08:24

## 2022-10-01 RX ADMIN — SODIUM CHLORIDE 60 ML: 9 INJECTION, SOLUTION INTRAVENOUS at 00:14

## 2022-10-01 RX ADMIN — SODIUM CHLORIDE 500 MG: 9 INJECTION, SOLUTION INTRAVENOUS at 20:40

## 2022-10-01 RX ADMIN — PANTOPRAZOLE SODIUM 20 MG: 20 TABLET, DELAYED RELEASE ORAL at 06:45

## 2022-10-01 ASSESSMENT — ENCOUNTER SYMPTOMS
SHORTNESS OF BREATH: 0
ABDOMINAL PAIN: 0

## 2022-10-01 ASSESSMENT — PAIN SCALES - GENERAL: PAINLEVEL_OUTOF10: 6

## 2022-10-01 NOTE — PROGRESS NOTES
Pt slept well overnight. NIH and neuro checks q4. Pt has bilateral upper extremity tremors. MRI completed overnight, waiting on results.

## 2022-10-01 NOTE — CONSULTS
NEUROSURGERY Frances Mejia  Kendra Moat  [id number]   1950   10/1/2022    Requesting physician: Paoal Rawls DO    Reason for [address]    History of present illness: Ileana Holliday is a 67 y.o. male who presents to the ED for fall. Patient does not know exactly what happened. He states he was in his bedroom and he just got out of bed and was headed to the bathroom when he fell. He is unsure if he lost consciousness. Patient's wife was in bed and did not witness the fall. .  Patient was recently diagnosed with Parkinson's according to the wife. Head CT showed right temporal ICH  ROS:   GENERAL:  Denies fever or recent illness.  Denies weight changes   EYES:  Denies vision change or diplopia  EARS:  Denies hearing loss  CARDIAC:  Denies chest pain  RESPIRATORY:  Denies shortness of breath  SKIN:  Denies rash or lesions   HEM:  Denies excessive bruising  PSYCH:  Denies anxiety or depression  NEURO:  Denies headache, numbness or tingling or lateralizing weakness   :  Denies urinary difficulty  GI: Denies nausea, vomiting, diarrhea or constipation  MUSCULOSKELETAL:  No arthralgias    Allergies   Allergen Reactions    Adhesive Tape Other (See Comments)     blisters    Oxycodone-Acetaminophen Itching    Percodan [Oxycodone-Aspirin] Itching       Past Medical History:   Diagnosis Date    Acid reflux     BP (high blood pressure)     Chronic back pain 2016    [place]     Depression     Hyperlipidemia     Neuropathy     Sleep apnea     uses CPAP        Past Surgical History:   Procedure Laterality Date    ANKLE FRACTURE SURGERY Right     KNEE SURGERY Bilateral     [place] REVISE MEDIAN N/CARPAL TUNNEL SURG Right 9/6/2018    RIGHT CARPAL TUNNEL RELEASE performed by Kenya Arce MD at [address] [facility] Right     SHOULDER SURGERY Bilateral        Social History     Occupational History    Not on file   Tobacco Use    Smoking status: Every Day     Packs/day: 0.50 Years: 49.00     Pack years: 24.50     Types: Cigarettes     Last attempt to quit: 2014     Years since quittin.4    Smokeless tobacco: Never   Substance and Sexual Activity    Alcohol use: No    Drug use: Yes     Types: Marijuana [facility])    Sexual activity: Not on file        No family history on file.      Outpatient Medications Marked as Taking for the 22 encounter [facility] Encounter)   Medication Sig Dispense Refill    folic acid (FOLVITE) 1 MG tablet Take 1 mg by mouth daily      amLODIPine (NORVASC) 5 MG tablet Take 5 mg by mouth daily      vitamin D (CHOLECALCIFEROL) 25 MCG (1000 UT) TABS tablet Take 1,000 Units by mouth daily      aspirin 81 MG chewable tablet Take 81 mg by mouth daily        Current Facility-Administered Medications   Medication Dose Route Frequency Provider Last Rate Last Admin    sodium chloride flush 0.9 % injection 5-40 mL  5-40 mL IntraVENous 2 times per day Sonya Green MD   10 mL at 10/01/22 0824    sodium chloride flush 0.9 % injection 5-40 mL  5-40 mL IntraVENous PRN Sonya Green MD        0.9 % sodium chloride infusion   IntraVENous PRN Sonya Green MD 25 mL/hr at 10/01/22 0829 New Bag at 10/01/22 0829    ondansetron (ZOFRAN-ODT) disintegrating tablet 4 mg  4 mg Oral Q8H PRN Sonya Green MD        Or    ondansetron [facility]) injection 4 mg  4 mg IntraVENous Q6H PRN Sonya Green MD        polyethylene glycol (GLYCOLAX) packet 17 g  17 g Oral Daily PRN Sonya Green MD        acetaminophen (TYLENOL) tablet 650 mg  650 mg Oral Q6H PRN Sonya Green MD   650 mg at 10/01/22 0217    Or    acetaminophen (TYLENOL) suppository 650 mg  650 mg Rectal Q6H PRN Sonya Green MD        levETIRAcetam (KEPPRA) 500 mg in sodium chloride 0.9 % 100 mL IVPB  500 mg IntraVENous Q12H YANIRA Upton - CNP   Stopped at 10/01/22 0845    atorvastatin (LIPITOR) tablet 80 mg  80 mg Oral Nightly Sonya Green MD   80 mg at 22    FLUoxetine (PROZAC) capsule 10 mg  10 mg Oral Daily Arnulfo Ruvalcaba MD   10 mg at 40/14/00 1853    folic acid (FOLVITE) tablet 1 mg  1 mg Oral Daily Arnulfo Ruvalcaba MD   1 mg at 10/01/22 3308    pantoprazole (PROTONIX) tablet 20 mg  20 mg Oral QAM AC Arnulfo Ruvalcaba MD   20 mg at 10/01/22 0645    vitamin D (CHOLECALCIFEROL) tablet 1,000 Units  1,000 Units Oral Daily Arnulfo Ruvalcaba MD   1,000 Units at 10/01/22 0773    carbidopa-levodopa (SINEMET)  MG per tablet 1 tablet  1 tablet Oral TID Arnulfo Ruvalcaba MD   1 tablet at 10/01/22 0823    amLODIPine (NORVASC) tablet 5 mg  5 mg Oral Daily Arnulfo Ruvalcaba MD   5 mg at 10/01/22 0824    losartan-hydroCHLOROthiazide (HYZAAR) 100-12.5 MG per tablet 1 tablet  1 tablet Oral Daily Arnulfo Ruvalcaba MD   1 tablet at 10/01/22 0824    labetalol (NORMODYNE;TRANDATE) injection 10 mg  10 mg IntraVENous Q6H PRN Arnulfo Ruvalcaba MD          Objective:  /83   Pulse 97   Temp 97.9 °F (36.6 °C) (Oral)   Resp 17   SpO2 95%     Physical Exam:  Patient seen and examined   General: Well developed. Alert and cooperative in no acute distress. HENT: atraumatic, neck supple  Eyes: Optic discs: Not tested  Pulmonary: unlabored respiratory effort  Cardiovascular:  Warm well perfused.  No peripheral edema  Gastrointestinal: abdomen soft, NT, ND    Neurologic Exam:  Neurological:  Mental Status: Awake, alert, oriented x 4, speech clear and appropriate  Attention: Intact  Language: No aphasia or dysarthria noted  Sensation: Intact to all extremities to light touch  Coordination: Intact  DTRs:    Right  Left    biceps  2 2   brachioradialis  2 2    Patella  2  2   ankle clonus  - -   toes (babinski)  down down     Cranial Nerves:  Cranial Nerves:  II: Visual acuity not tested, denies new visual changes / diplopia  III, IV, VI: PERRL, 3 mm bilaterally, EOMI, no nystagmus noted  V: Facial sensation intact bilaterally to touch  VII: Face symmetric  VIII: Hearing intact bilaterally to spoken voice  IX: Palate movement equal bilaterally  XI: Shoulder shrug equal bilaterally  XII: Tongue midline    Musculoskeletal:   Gait: Not tested   Assist devices: None   Tone:   Motor strength:    Right  Left    Right  Left    Deltoid  5 5  Hip Flex  5 5   Biceps  5 5  Knee Extensors  5 5   Triceps  5 5  Knee Flexors  5 5   Wrist Ext  5 5  Ankle Dorsiflex. 5 5   Wrist Flex  5 5  Ankle Plantarflex. 5 5   Handgrip  5 5  Ext Daniel Longus  5 5   Thumb Ext  5 5             Radiological Findings:  CT Head W/O Contrast   Final Result   Focal nodular hyperdensity in the right temporal lobe, suspicious for   hemorrhagic contusion given the history of trauma       Atrophy and small-vessel ischemic change. Encephalomalacia right temporal   lobe is similar. Mild paranasal sinus disease    Repeat was stable. MRI showed no underlying mass    Labs  Recent Labs     09/30/22  0830 10/01/22  0720    138    104   CO2 24 21   BUN 18 28*   CREATININE 1.2 1.0   GLUCOSE 111* 115*     Recent Labs     09/30/22  0830 10/01/22  0720   WBC 10.5 10.6   RBC 4.74 4.26   INR  --  1.17*           Patient Active Problem List    Diagnosis Date Noted    Right temporal lobe mass 09/30/2022    Focal hemorrhagic contusion of cerebrum 09/30/2022    Left knee pain 05/02/2014    Baker cyst 05/02/2014    Osteoarthritis of left knee 05/02/2014    Knee effusion 05/02/2014    Obesity, Class III, BMI 40-49.9 (morbid obesity) ([facility] [address] 05/02/2014       Assessment:  67year old man who had a fall resulting in right temoral ICH. Imaging has remained stable     Plan:        1. No emergent neurosurgical intervention indicated. Ok to [employer] once ok with medical md   -  For change in exam MUST contact neurosurgery team along with critical care or primary team  Follow up head CT stable. No further imaging unless there is a decline in neurologic assessment  Maintain SBP <160;  If PRN med insufficient, then may start Nicardipine infusion  Keep Plt >100k & INR <1.4  Hold all anticoagulation & antiplatelet for 2 weeks  SCDs for DVT prophylaxis  Cerebral edema:  - Decadron 4 mg Q6H  - Keep HOB >30 degrees  - NO dextrose in IVF's or in IV drips  Seizure prophylaxis: Keppra 500 mg BID  GI Prophylaxis: Pepcid  Normalize sodium  Advance diet / activity per primary team  Follow up w/ neurosurgery in 2 weeks w/ head CT prior to resuming home antiplatlet  Thanks for consult. Please call w/ questions or decline in mental status   Gustavo Franklin PA-C   Electronically signed by:  INNA Lan, 10/1/2022 1:36 PM

## 2022-10-01 NOTE — PROGRESS NOTES
Progress Note    Admit Date: 9/30/2022  Day: 2  Diet: Diet NPO    CC: Fall,     Interval history: Patient seen and examined at bedside. No acute events overnight. Patient had orthostatic blood pressures checked and were normal. MRI was done overnight and showed 1.3cm hematoma, most likely cause is hypertensive or posttraumatic. Patient states he feels well this morning and denies any neurological changes. Denies headache, numbness/weakness/tingling, vision changes. HPI: Jacob Patel is a 67 y.o. male with PMH as below notable for parkinson's disease, alzheimer's disease, hypertension, hyperlipidemia, and GERD, who presented after a fall at home this morning. He had 2 falls the night before. Patient does not remember what happened to precipitate the fall but does remember getting out of bed to go to the bathroom when the fall occurred. He is not sure if he lost consciousness and there were no witnesses. Wife states he has been diagnosed with parkinson's disease in the past. Further, she stated that he \"falls at least once a week. \" Of note, patient has a laceration that has been stitched on the left side of his forehead. Laceration was not bleeding upon examination. Denies HA, changes to vision/hearing, chest pain, palpitations, SOB, cough, abdominal pain, or changes to urinating and bowel movements. Not taking any anti-coagulants except Aspirin 81mg. Follows with Neurology at [facility] and was recently diagnosed with possible alzheimer's secondary to lewy body dementia. Recently diagnosed 08/2022 by Neuropsychiatry (Dr. Yancey Leyden) at [address] [place] [address]. [facility], [facility], last visit 09/28 dose was increased to 50-200mg TID.      Medications:     Scheduled Meds:   sodium chloride flush  5-40 mL IntraVENous 2 times per day    levETIRAcetam  500 mg IntraVENous Q12H    atorvastatin  80 mg Oral Nightly    FLUoxetine  10 mg Oral Daily    folic acid  1 mg Oral Daily    pantoprazole  20 mg Oral QAM AC    Vitamin D  1,000 Units Oral Daily    carbidopa-levodopa  1 tablet Oral TID    amLODIPine  5 mg Oral Daily    losartan-hydroCHLOROthiazide  1 tablet Oral Daily     Continuous Infusions:   sodium chloride 25 mL/hr at 10/01/22 0829     PRN Meds:sodium chloride flush, sodium chloride, ondansetron **OR** ondansetron, polyethylene glycol, acetaminophen **OR** acetaminophen, labetalol    Objective:   Vitals:   T-max:  Patient Vitals for the past 8 hrs:   BP Temp Temp src Pulse Resp SpO2   10/01/22 0824 116/83 -- -- -- -- --   10/01/22 0740 116/83 -- -- 97 -- --   10/01/22 0735 123/86 -- -- 80 -- --   10/01/22 0730 112/71 -- -- 59 -- --   10/01/22 0630 96/62 97.9 °F (36.6 °C) Oral 82 17 95 %   10/01/22 0221 113/72 97.8 °F (36.6 °C) Oral 73 18 96 %       Intake/Output Summary (Last 24 hours) at 10/1/2022 0847  Last data filed at 10/1/2022 6826  Gross per 24 hour   Intake 510 ml   Output 300 ml   Net 210 ml       Review of Systems   Constitutional:  Negative for chills and fever. Respiratory:  Negative for shortness of breath. Cardiovascular:  Negative for chest pain. Gastrointestinal:  Negative for abdominal pain. Neurological:  Negative for dizziness, weakness, light-headedness, numbness and headaches. Psychiatric/Behavioral:  Negative for agitation and confusion. Physical Exam  Constitutional:       General: He is not in acute distress. Appearance: Normal appearance. He is not toxic-appearing. HENT:      Head: Normocephalic. Comments: Left forehead laceration, sutured. Eyes:      Extraocular Movements: Extraocular movements intact. Cardiovascular:      Rate and Rhythm: Normal rate and regular rhythm. Pulses: Normal pulses. Heart sounds: Normal heart sounds. Pulmonary:      Effort: Pulmonary effort is normal.      Breath sounds: Normal breath sounds. Abdominal:      General: Abdomen is flat. Bowel sounds are normal.      Palpations: Abdomen is soft.    Musculoskeletal:         General: Normal range of motion. Skin:     General: Skin is warm and dry. Neurological:      General: No focal deficit present. Mental Status: He is alert and oriented to person, place, and time. Psychiatric:         Mood and Affect: Mood normal.       LABS:    CBC:   Recent Labs     09/30/22  0830 10/01/22  0720   WBC 10.5 10.6   HGB 14.7 13.3*   HCT 43.8 39.1*    172   MCV 92.4 91.7     Renal:    Recent Labs     09/30/22  0830 10/01/22  0720    138   K 3.8 3.7    104   CO2 24 21   BUN 18 28*   CREATININE 1.2 1.0   GLUCOSE 111* 115*   CALCIUM 9.8 9.6   ANIONGAP 11 13     Hepatic:   Recent Labs     09/30/22  0830 10/01/22  0720   AST 14* 11*   ALT 7* <5*   BILITOT 0.5 0.5   PROT 6.8 6.2*   LABALBU 4.2 3.7   ALKPHOS 89 84     Troponin:   Recent Labs     09/30/22 0830   TROPONINI <0.01     BNP: No results for input(s): BNP in the last 72 hours. Lipids: No results for input(s): CHOL, HDL in the last 72 hours. Invalid input(s): LDLCALCU, TRIGLYCERIDE  ABGs:  No results for input(s): PHART, IRJ8ESO, PO2ART, BEY3SJA, BEART, THGBART, Q5SALTAD, WKH7UHV in the last 72 hours. INR:   Recent Labs     10/01/22  0720   INR 1.17*     Lactate: No results for input(s): LACTATE in the last 72 hours. Cultures:  -----------------------------------------------------------------  RAD:   MRI BRAIN W WO CONTRAST   Final Result      1. Right temporal lobe  1.3 cm hematoma with mild vasogenic edema, but no evidence for underlying enhancing neoplasm or vascular lesion. Location and morphology is nonspecific, but most likely hypertensive or posttraumatic. 2. Small amount of superficial siderosis involving the inferior aspect of cerebellum related to remote hemorrhagic insult   3. Mild generalized atrophy and chronic small vessel ischemia               CT HEAD WO CONTRAST   Final Result      1. Focal intracerebral hemorrhage within the right temporal lobe, unchanged. 2. Right temporal encephalomalacia.    3. Mild cortical atrophy. Assessment/Plan:   Caterina Copeland is a 67 y.o. male with PMH as below notable for parkinson's disease, alzheimer's disease, hypertension, hyperlipidemia, and GERD, who presented after a fall at home. Right Temporal Lobe Hemorrhagic Contusion 2/2 to unwitnessed fall, likely in setting of Parkinson's. - Stitched laceration on left side of forehead after fall. Wife states that he falls \"at least once per week. \" Patient reportedly fell 2 times the night before this incident.   - CT head w/o contrast found focal nodular hyperdensity in the right temporal lobe, suspicious for hemorrhagic contusion. Also, found atrophy and small-vessel ischemic change  - Neuro surgery consulted, appreciate recommendations  - Neuro checks Q4  - Keppra 500mg BID  - Continue to monitor for focal neuro deficits  - Repeat CT head stable from prior  - MRI w/wo contrast showed 1.3 cm hematoma right temporal lobe with mild vasogenic edema but no evidence for underlying enhancing neoplasm or vascular lesion; most likely hypertensive or posttraumatic     Recently diagnosed Parkinson's, with possible Alzheimers vs Lewy Body Dementia  - Follows with neurology, Janet Beatty CNP at [facility].   Per neuro, patient began having resting tremors 2 to 3 years ago and was ultimately diagnosed just recently after neuropsychiatric testing with Dr. Abraham Stone on 08/2022.  - On home Sinemet  mg TID     Chronic Conditions:  HTN - Will start Home Norvasc, Hyzaar tomorrow  HLD - continue atorvastatin  GERD - continue pantoprazole  Depression - continue fluoxetine     Code Status: Full Code  FEN: Diet NPO  PPX:  SCDs  DISPO: VANESSA Forde DO, PGY-2  10/01/22  8:47 AM    This patient has been staffed and discussed with Padmini Max MD.

## 2022-10-01 NOTE — PROGRESS NOTES
MD gave orders for a diet order and also to put in PT/OT eval and treat order. Orders placed.   Alcira Hamlin RN

## 2022-10-01 NOTE — PROGRESS NOTES
I spoke to Neurosurgery (Dr. Lisandro Stahl) he states that the pt can be discharged from his standpoint but that the medical team would have to discharge him since they are the attending. Neurosurgery also said he can eat. I passed this information on to the medical MD via perfect serve. I will continue to follow.   Maureen Hernandez RN

## 2022-10-02 VITALS
DIASTOLIC BLOOD PRESSURE: 76 MMHG | TEMPERATURE: 98 F | OXYGEN SATURATION: 96 % | RESPIRATION RATE: 16 BRPM | HEART RATE: 68 BPM | SYSTOLIC BLOOD PRESSURE: 122 MMHG

## 2022-10-02 LAB
A/G RATIO: 1.9 (ref 1.1–2.2)
ALBUMIN SERPL-MCNC: 3.7 G/DL (ref 3.4–5)
ALP BLD-CCNC: 82 U/L (ref 40–129)
ALT SERPL-CCNC: 14 U/L (ref 10–40)
ANION GAP SERPL CALCULATED.3IONS-SCNC: 11 MMOL/L (ref 3–16)
AST SERPL-CCNC: 18 U/L (ref 15–37)
BASOPHILS ABSOLUTE: 0 K/UL (ref 0–0.2)
BASOPHILS RELATIVE PERCENT: 0.3 %
BILIRUB SERPL-MCNC: 0.3 MG/DL (ref 0–1)
BUN BLDV-MCNC: 26 MG/DL (ref 7–20)
CALCIUM SERPL-MCNC: 9.5 MG/DL (ref 8.3–10.6)
CHLORIDE BLD-SCNC: 105 MMOL/L (ref 99–110)
CO2: 21 MMOL/L (ref 21–32)
CREAT SERPL-MCNC: 0.9 MG/DL (ref 0.8–1.3)
EOSINOPHILS ABSOLUTE: 0.1 K/UL (ref 0–0.6)
EOSINOPHILS RELATIVE PERCENT: 0.7 %
GFR AFRICAN AMERICAN: >60
GFR NON-AFRICAN AMERICAN: >60
GLUCOSE BLD-MCNC: 104 MG/DL (ref 70–99)
HCT VFR BLD CALC: 40 % (ref 40.5–52.5)
HEMOGLOBIN: 13.5 G/DL (ref 13.5–17.5)
LYMPHOCYTES ABSOLUTE: 2.9 K/UL (ref 1–5.1)
LYMPHOCYTES RELATIVE PERCENT: 25.7 %
MCH RBC QN AUTO: 31.1 PG (ref 26–34)
MCHC RBC AUTO-ENTMCNC: 33.8 G/DL (ref 31–36)
MCV RBC AUTO: 91.9 FL (ref 80–100)
MONOCYTES ABSOLUTE: 0.9 K/UL (ref 0–1.3)
MONOCYTES RELATIVE PERCENT: 7.6 %
NEUTROPHILS ABSOLUTE: 7.5 K/UL (ref 1.7–7.7)
NEUTROPHILS RELATIVE PERCENT: 65.7 %
PDW BLD-RTO: 13.7 % (ref 12.4–15.4)
PLATELET # BLD: 149 K/UL (ref 135–450)
PMV BLD AUTO: 9.6 FL (ref 5–10.5)
POTASSIUM REFLEX MAGNESIUM: 3.9 MMOL/L (ref 3.5–5.1)
RBC # BLD: 4.36 M/UL (ref 4.2–5.9)
SODIUM BLD-SCNC: 137 MMOL/L (ref 136–145)
TOTAL PROTEIN: 5.7 G/DL (ref 6.4–8.2)
WBC # BLD: 11.4 K/UL (ref 4–11)

## 2022-10-02 PROCEDURE — 80053 COMPREHEN METABOLIC PANEL: CPT

## 2022-10-02 PROCEDURE — 2580000003 HC RX 258: Performed by: NURSE PRACTITIONER

## 2022-10-02 PROCEDURE — 97162 PT EVAL MOD COMPLEX 30 MIN: CPT

## 2022-10-02 PROCEDURE — 36415 COLL VENOUS BLD VENIPUNCTURE: CPT

## 2022-10-02 PROCEDURE — 97530 THERAPEUTIC ACTIVITIES: CPT

## 2022-10-02 PROCEDURE — 97166 OT EVAL MOD COMPLEX 45 MIN: CPT

## 2022-10-02 PROCEDURE — 85025 COMPLETE CBC W/AUTO DIFF WBC: CPT

## 2022-10-02 PROCEDURE — 6370000000 HC RX 637 (ALT 250 FOR IP)

## 2022-10-02 PROCEDURE — 2580000003 HC RX 258

## 2022-10-02 PROCEDURE — 6360000002 HC RX W HCPCS: Performed by: NURSE PRACTITIONER

## 2022-10-02 PROCEDURE — 97116 GAIT TRAINING THERAPY: CPT

## 2022-10-02 RX ORDER — LEVETIRACETAM 500 MG/1
500 TABLET ORAL 2 TIMES DAILY
Qty: 60 TABLET | Refills: 1 | Status: CANCELLED | OUTPATIENT
Start: 2022-10-02

## 2022-10-02 RX ADMIN — SODIUM CHLORIDE, PRESERVATIVE FREE 10 ML: 5 INJECTION INTRAVENOUS at 09:55

## 2022-10-02 RX ADMIN — AMLODIPINE BESYLATE 5 MG: 5 TABLET ORAL at 09:55

## 2022-10-02 RX ADMIN — FOLIC ACID 1 MG: 1 TABLET ORAL at 08:43

## 2022-10-02 RX ADMIN — ACETAMINOPHEN 650 MG: 325 TABLET, FILM COATED ORAL at 07:42

## 2022-10-02 RX ADMIN — Medication 1000 UNITS: at 08:42

## 2022-10-02 RX ADMIN — PANTOPRAZOLE SODIUM 20 MG: 20 TABLET, DELAYED RELEASE ORAL at 06:50

## 2022-10-02 RX ADMIN — FLUOXETINE 10 MG: 10 CAPSULE ORAL at 08:42

## 2022-10-02 RX ADMIN — LOSARTAN POTASSIUM AND HYDROCHLOROTHIAZIDE 1 TABLET: 12.5; 1 TABLET ORAL at 09:55

## 2022-10-02 RX ADMIN — SODIUM CHLORIDE 500 MG: 9 INJECTION, SOLUTION INTRAVENOUS at 08:50

## 2022-10-02 RX ADMIN — CARBIDOPA AND LEVODOPA 1 TABLET: 25; 100 TABLET ORAL at 08:42

## 2022-10-02 ASSESSMENT — PAIN SCALES - GENERAL: PAINLEVEL_OUTOF10: 4

## 2022-10-02 ASSESSMENT — PAIN DESCRIPTION - ORIENTATION: ORIENTATION: LEFT

## 2022-10-02 ASSESSMENT — ENCOUNTER SYMPTOMS
SHORTNESS OF BREATH: 0
ABDOMINAL PAIN: 0

## 2022-10-02 ASSESSMENT — PAIN DESCRIPTION - DESCRIPTORS: DESCRIPTORS: THROBBING

## 2022-10-02 ASSESSMENT — PAIN DESCRIPTION - LOCATION: LOCATION: HEAD;EYE

## 2022-10-02 ASSESSMENT — PAIN - FUNCTIONAL ASSESSMENT: PAIN_FUNCTIONAL_ASSESSMENT: ACTIVITIES ARE NOT PREVENTED

## 2022-10-02 NOTE — PROGRESS NOTES
Progress Note    Admit Date: 9/30/2022  Day: 2  Diet: ADULT DIET; Regular    CC: Fall,     Interval history:   Patient seen at bedside this AM. No issues overnight and denies any new complaints this AM. Denies H/A, lightheadedness, changes in vision, Cp, Sob, abd pain. VSS, on room air. Labs reviewed, Cr/Lytes WNL, CBC WNL. UOP 650cc. Dispo: cleared per NSGY, awaiting PT/OT. Medications:     Scheduled Meds:   sodium chloride flush  5-40 mL IntraVENous 2 times per day    levETIRAcetam  500 mg IntraVENous Q12H    atorvastatin  80 mg Oral Nightly    FLUoxetine  10 mg Oral Daily    folic acid  1 mg Oral Daily    pantoprazole  20 mg Oral QAM AC    Vitamin D  1,000 Units Oral Daily    carbidopa-levodopa  1 tablet Oral TID    amLODIPine  5 mg Oral Daily    losartan-hydroCHLOROthiazide  1 tablet Oral Daily     Continuous Infusions:   sodium chloride 10 mL/hr at 10/01/22 2039     PRN Meds:sodium chloride flush, sodium chloride, ondansetron **OR** ondansetron, polyethylene glycol, acetaminophen **OR** acetaminophen, labetalol    Objective:   Vitals:   T-max:  Patient Vitals for the past 8 hrs:   BP Temp Temp src Pulse Resp SpO2   10/02/22 0842 (!) 117/54 -- -- -- -- --   10/02/22 0645 133/87 97.5 °F (36.4 °C) Oral 58 18 97 %   10/02/22 0300 121/71 98 °F (36.7 °C) Oral 64 18 94 %       Intake/Output Summary (Last 24 hours) at 10/2/2022 0911  Last data filed at 10/1/2022 1504  Gross per 24 hour   Intake --   Output 650 ml   Net -650 ml       Review of Systems   Constitutional:  Negative for chills and fever. Respiratory:  Negative for shortness of breath. Cardiovascular:  Negative for chest pain. Gastrointestinal:  Negative for abdominal pain. Neurological:  Negative for dizziness, weakness, light-headedness, numbness and headaches. Psychiatric/Behavioral:  Negative for agitation and confusion. Physical Exam  Constitutional:       General: He is not in acute distress. Appearance: Normal appearance. He is not toxic-appearing. HENT:      Head: Normocephalic. Comments: Left forehead laceration, sutured. Eyes:      Extraocular Movements: Extraocular movements intact. Cardiovascular:      Rate and Rhythm: Normal rate and regular rhythm. Pulses: Normal pulses. Heart sounds: Normal heart sounds. Pulmonary:      Effort: Pulmonary effort is normal.      Breath sounds: Normal breath sounds. Abdominal:      General: Abdomen is flat. Bowel sounds are normal.      Palpations: Abdomen is soft. Musculoskeletal:         General: Normal range of motion. Skin:     General: Skin is warm and dry. Neurological:      General: No focal deficit present. Mental Status: He is alert and oriented to person, place, and time. Psychiatric:         Mood and Affect: Mood normal.       LABS:    CBC:   Recent Labs     09/30/22  0830 10/01/22  0720 10/02/22  0644   WBC 10.5 10.6 11.4*   HGB 14.7 13.3* 13.5   HCT 43.8 39.1* 40.0*    172 149   MCV 92.4 91.7 91.9     Renal:    Recent Labs     09/30/22  0830 10/01/22  0720 10/02/22  0644    138 137   K 3.8 3.7 3.9    104 105   CO2 24 21 21   BUN 18 28* 26*   CREATININE 1.2 1.0 0.9   GLUCOSE 111* 115* 104*   CALCIUM 9.8 9.6 9.5   ANIONGAP 11 13 11     Hepatic:   Recent Labs     09/30/22  0830 10/01/22  0720 10/02/22  0644   AST 14* 11* 18   ALT 7* <5* 14   BILITOT 0.5 0.5 0.3   PROT 6.8 6.2* 5.7*   LABALBU 4.2 3.7 3.7   ALKPHOS 89 84  --      Troponin:   Recent Labs     09/30/22 0830   TROPONINI <0.01     BNP: No results for input(s): BNP in the last 72 hours. Lipids: No results for input(s): CHOL, HDL in the last 72 hours. Invalid input(s): LDLCALCU, TRIGLYCERIDE  ABGs:  No results for input(s): PHART, ZDY3QEN, PO2ART, SHQ0SHC, BEART, THGBART, S6CVEQHF, EZG5CFA in the last 72 hours.     INR:   Recent Labs     10/01/22  0720   INR 1.17*     Lactate: No results for input(s): LACTATE in the last 72 hours.  Cultures:  -----------------------------------------------------------------  RAD:   MRI BRAIN W WO CONTRAST   Final Result      1. Right temporal lobe  1.3 cm hematoma with mild vasogenic edema, but no evidence for underlying enhancing neoplasm or vascular lesion. Location and morphology is nonspecific, but most likely hypertensive or posttraumatic. 2. Small amount of superficial siderosis involving the inferior aspect of cerebellum related to remote hemorrhagic insult   3. Mild generalized atrophy and chronic small vessel ischemia               CT HEAD WO CONTRAST   Final Result      1. Focal intracerebral hemorrhage within the right temporal lobe, unchanged. 2. Right temporal encephalomalacia. 3. Mild cortical atrophy. Assessment/Plan:   Izzy Ibrahim is a 67 y.o. male with PMH as below notable for parkinson's disease, alzheimer's disease, hypertension, hyperlipidemia, and GERD, who presented after a fall at home. Right Temporal Lobe Hemorrhagic Contusion 2/2 to unwitnessed fall, likely in setting of Parkinson's. - Stitched laceration on left side of forehead after fall. Wife states that he falls \"at least once per week. \" Patient reportedly fell 2 times the night before this incident.   - CT head w/o contrast found focal nodular hyperdensity in the right temporal lobe, suspicious for hemorrhagic contusion.  Also, found atrophy and small-vessel ischemic change  - Neuro surgery consulted, appreciate recommendations  - Neuro checks Q4  - Keppra 500mg BID  - Continue to monitor for focal neuro deficits  - Repeat CT head stable from prior  - MRI w/wo contrast - 1.3 cm hematoma right temporal lobe with mild vasogenic edema but no evidence for underlying enhancing neoplasm or vascular lesion; most likely hypertensive or posttraumatic  - cleared for d/c per NSGY  - f/u NSGY 2 weeks, repeat head CT before AC/AP  - f/u PT/OT     Recently diagnosed Parkinson's, with possible Alzheimers vs Lewy Body Dementia  - Follows with neurology, Don Lanza CNP at [facility].   Per neuro, patient began having resting tremors 2 to 3 years ago and was ultimately diagnosed just recently after neuropsychiatric testing with Dr. Alejandra Herrera on 08/2022.  - On home Sinemet  mg TID     Chronic Conditions:  HTN - Will start Home Norvasc, Hyzaar tomorrow  HLD - continue atorvastatin  GERD - continue pantoprazole  Depression - continue fluoxetine     Code Status: Full Code  FEN: Diet NPO  PPX:  SCDs  DISPO: Mohsen Lucia MD, PGY-1  10/02/22  9:11 AM    This patient has been staffed and discussed with Lillie Shaikh MD.

## 2022-10-02 NOTE — DISCHARGE INSTR - COC
Continuity of Care Form    Patient Name: Letcher Schirmer   :  1950  MRN:  [id number]    [facility] date:  2022  Discharge date:  ***    Code Status Order: Full Code   Advance Directives:     Admitting Physician:  Lucy Page DO  PCP: Otilia Boykin MD    Discharging Nurse: [facility] Unit/Room#: 5209/2763-06  Discharging Unit Phone Number: ***    Emergency Contact:   Extended Emergency Contact Information  Primary Emergency Contact: [facility]  Address: [address] [place] [address] [place] of [address] [place] [address] Phone: [phone]  Work Phone: [phone]  Relation: Spouse    Past Surgical History:  Past Surgical History:   Procedure Laterality Date    ANKLE FRACTURE SURGERY Right     KNEE SURGERY Bilateral     [place] REVISE MEDIAN N/CARPAL TUNNEL SURG Right 2018    RIGHT CARPAL TUNNEL RELEASE performed by Alyce Peñaloza MD at [address] Right     SHOULDER SURGERY Bilateral        Immunization History: There is no immunization history for the selected administration types on file for this patient. 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 33AA       Isolation/Infection:   Isolation            No Isolation          Patient Infection Status       None to display            Nurse Assessment:  Last Vital Signs: /76   Pulse 68   Temp 98 °F (36.7 °C) (Oral)   Resp 16   SpO2 96%     Last documented pain score (0-10 scale): Pain Level: 4  Last Weight:   Wt Readings from Last 1 Encounters:   22 270 lb (122.5 kg)     Mental Status:  {IP PT MENTAL STATUS:47851}    IV Access:  { CHELE IV ACCESS:420788893}    Nursing Mobility/ADLs:  Walking   {P DME SGHC:303836444}  Transfer  {CHP DME IUYL:797509220}  Bathing  {CHP DME WGWC:048351120}  Dressing  {CHP DME YFXV:213248388}  Toileting  {CHP DME PMPS:834039125}  Feeding  {CHP DME PQON:499797038}  Med Admin  {CHP DME USNS:025008360}  Med Delivery   {[facility] MED Delivery:750648485}    Wound Care Documentation and Therapy:  Incision 18 Arm Right (Active)   Number of days: 1487        Elimination:  Continence: Bowel: {YES / OF:66358}  Bladder: {YES / FI:74368}  Urinary Catheter: {Urinary Catheter:440758736}   Colostomy/Ileostomy/Ileal Conduit: {YES / AC:42510}       Date of Last BM: ***    Intake/Output Summary (Last 24 hours) at 10/2/2022 1350  Last data filed at 10/1/2022 1504  Gross per 24 hour   Intake --   Output 275 ml   Net -275 ml     I/O last 3 completed shifts:   In: 270 [P.O.:160; I.V.:10; IV Piggyback:100]  Out: 950 [Urine:950]    Safety Concerns:     [address] [employer] Safety Concerns:163240813}    Impairments/Disabilities:      [address] [employer] Impairments/Disabilities:440928383}    Nutrition Therapy:  Current Nutrition Therapy:   [employer] Diet List:361928104}    Routes of Feeding: {P DME Other Feedings:377904075}  Liquids: {Slp liquid thickness:57382}  Daily Fluid Restriction: {CHP DME Yes amt example:547547396}  Last Modified Barium Swallow with Video (Video Swallowing Test): {Done Not Done CYVM:794776599}    Treatments at the Time of Hospital Discharge:   Respiratory Treatments: ***  Oxygen Therapy:  {Therapy; copd oxygen:71332}  Ventilator:    { ORA Vent XRAE:972218889}    Rehab Therapies: {THERAPEUTIC INTERVENTION:5238175815}  Weight Bearing Status/Restrictions: 508 MD.Voice  Weight Bearin}  Other Medical Equipment (for information only, NOT a DME order):  {EQUIPMENT:100513031}  Other Treatments: ***    Patient's personal belongings (please select all that are sent with patient):  {CARMEN DME Belongings:007323486}    RN SIGNATURE:  {Esignature:017801372}    CASE MANAGEMENT/SOCIAL WORK SECTION    Inpatient Status Date: ***    Readmission Risk Assessment Score:  Readmission Risk              Risk of Unplanned Readmission:  11           Discharging to Facility/ Agency   Name:   Address:  Phone:  Fax:    Dialysis Facility (if applicable)   Name:  Address:  Dialysis Schedule:  Phone:  Fax:    / signature: {Rajaniature:318109502}    PHYSICIAN SECTION    Prognosis: {Prognosis:3477068634}    Condition at Discharge: 508 Nusrat Jarred Patient Condition:070918274}    Rehab Potential (if transferring to Rehab): {Prognosis:6696016759}    Recommended Labs or Other Treatments After Discharge: ***    Physician Certification: I certify the above information and transfer of Orpha Prophet  is necessary for the continuing treatment of the diagnosis listed and that he requires {Admit to Appropriate Level of Care:71538} for {GREATER/LESS:743630889} 30 days.      Update Admission H&P: {CHP DME Changes in LGKSJ:935602061}    PHYSICIAN SIGNATURE:  {Esignature:931943464}

## 2022-10-02 NOTE — PROGRESS NOTES
Patient alert and oriented X 4. VSS. NIH 0. No c/o of pain. Tolerating fluids and diet. Taking medication  www. Ambulate using walker and gait belt. BRP. No c/o numbness or tingling. All fall precaution in place. Will continue to monitor.

## 2022-10-02 NOTE — PROGRESS NOTES
Pt A&Ox4, VSS. No acute changes this shift. Pt has been up to restroom once x1 with walker and gait belt. Pt worked with therapy earlier today and has been in chair since. Pt denies further needs at this time. Pt requested outpatient therapy, in need of prescription, MD aware. Chair alarm on, wheels locked, call light within reach.

## 2022-10-02 NOTE — DISCHARGE SUMMARY
INTERNAL MEDICINE DEPARTMENT AT [address] [place] [address]  DISCHARGE SUMMARY    Patient ID: Teodora Lanza                                             Discharge Date: 10/2/2022   Patient's PCP: Melissa Castillo MD                                          Discharge Physician: Galindo Hess MD MD  Admit Date: 9/30/2022   Admitting Physician: Shabnam Connell DO    PROBLEMS DURING HOSPITALIZATION:  Present on Admission:   Right temporal lobe mass   Focal hemorrhagic contusion of cerebrum      DISCHARGE DIAGNOSES:  Right Temporal Lobe Hematoma   Parkinson's Disease. Alzheimer's vs. LBD  HTN  HLD  GERD  Depression    HPI:  Teodora Lanza is a 67 y.o. male with PMH as below notable for parkinson's disease, alzheimer's disease, hypertension, hyperlipidemia, and GERD, who presented after a fall at home this morning. He had 2 falls the night before. Patient does not remember what happened to precipitate the fall but does remember getting out of bed to go to the bathroom when the fall occurred. He is not sure if he lost consciousness and there were no witnesses. Wife states he has been diagnosed with parkinson's disease in the past. Further, she stated that he \"falls at least once a week. \" Of note, patient has a laceration that has been stitched on the left side of his forehead. Laceration was not bleeding upon examination. Denies HA, changes to vision/hearing, chest pain, palpitations, SOB, cough, abdominal pain, or changes to urinating and bowel movements. Not taking any anti-coagulants except Aspirin 81mg. Follows with Neurology at [facility] and was recently diagnosed with possible alzheimer's secondary to lewy body dementia. Recently diagnosed 08/2022 by Neuropsychiatry (Dr. Essie Conti) at [facility]. Takes Sinamet, last visit 09/28 dose was increased to 50-200mg TID. [facility] ED Course:   Vitals were stable and all labs were normal upon review.  CT head w/o contrast found focal nodular hyperdensity in the right temporal lobe, suspicious for hemorrhagic contusion. Also, found atrophy and small-vessel ischemic change. The following issues were addressed during hospitalization:    Right Temporal Lobe Hemorrhagic Contusion 2/2 to unwitnessed fall, likely in setting of Parkinson's. Patient presented to [facility] ED initially with a left head lack due to a unwitnessed fall. CT showed left temporal lobe hemorrhagic contusion. Patient was alert and oriented x4 with no abnormalities in neuro exam aside from resting tremors in his upper extremities in the setting of Parkinson's disease. He was then transferred to [facility] for further neurosurgical assessment. Neurosurgery was consulted and a repeat CT head was done which showed stable findings from prior. MRI of the head confirmed a 3.1 cm right temporal lobe hematoma with mild vasogenic edema surrounding. No interventions performed, patient was started on Keppra for seizure prophylaxis. Patient remained asymptomatic during this admission. Patient will follow with PCP in 10 days, and will follow up with neurosurgery outpatient in 2 weeks. He will temporarily discontinue use of his aspirin and celecoxib until repeat CT head in 2 weeks shows stable findings and neurosurgery will determine possible resumption. Of note, patient will not be discharged on Keppra or Decadron per neurosurgery recommendations. Recently diagnosed Parkinson's, with possible Alzheimers vs Lewy Body Dementia  Home Sinemet was restarted on admission. Upon discharge patient will resume this as scheduled. HTN - continue Home Norvasc and Hyzaar was given. HLD - continue atorvastatin  GERD - continue pantoprazole  Depression - continue fluoxetine      Physical Exam:  Constitutional:       General: He is not in acute distress. HENT:      Head: Laceration present. Comments: Stitched laceration on left side of forehead. Eyes:      General: Vision grossly intact.       Extraocular Movements: Extraocular movements intact. Conjunctiva/sclera: Conjunctivae normal.      Pupils: Pupils are equal, round, and reactive to light. Visual Fields: Right eye visual fields normal and left eye visual fields normal.   Cardiovascular:      Rate and Rhythm: Normal rate and regular rhythm. Chest Wall: No thrill. Heart sounds: Normal heart sounds, S1 normal and S2 normal.     No friction rub. No gallop. Pulmonary:      Effort: Pulmonary effort is normal.      Breath sounds: Normal breath sounds and air entry. Abdominal:      General: Abdomen is flat. Bowel sounds are normal. There is no distension. Palpations: Abdomen is soft. Tenderness: There is no abdominal tenderness. Musculoskeletal:      Cervical back: Normal range of motion. Neurological:      General: No focal deficit present. Mental Status: He is alert and oriented to person, place, and time. Cranial Nerves: Cranial nerves 2-12 are intact. Sensory: Sensation is intact. Motor: Tremor present. Coordination: Coordination is intact. Romberg sign negative. Finger-Nose-Finger Test and Heel to [facility] Test normal. Rapid alternating movements normal.   Psychiatric:         Behavior: Behavior is cooperative. Consults: Neurosurgery  Significant Diagnostic Studies: CT head, MRI head  Treatments: Keppra, neurosurgical evaluation.   Disposition: home  Discharged Condition: Stable  Follow Up: Primary Care Physician in one week    DISCHARGE MEDICATION:       Medication List        CONTINUE taking these medications      amLODIPine 5 MG tablet  Commonly known as: NORVASC     atorvastatin 80 MG tablet  Commonly known as: LIPITOR     CARBIDOPA-LEVODOPA EN     FLUoxetine 10 MG tablet  Commonly known as: PROZAC     folic acid 1 MG tablet  Commonly known as: FOLVITE     LOSARTAN POTASSIUM-HCTZ PO     omeprazole 10 MG delayed release capsule  Commonly known as: PRILOSEC     vitamin D 25 MCG (1000 UT) Tabs tablet  Commonly known as: CHOLECALCIFEROL            STOP taking these medications      aspirin 81 MG chewable tablet     celecoxib 200 MG capsule  Commonly known as: CELEBREX     gabapentin 300 MG capsule  Commonly known as: NEURONTIN               Activity: activity as tolerated  Diet: regular diet  Wound Care: as directed    Time Spent on discharge is more than 30 minutes    Signed:  Kenya Cheema MD, PGY-1  10/2/2022

## 2022-10-02 NOTE — PLAN OF CARE
Problem: Discharge Planning  Goal: Discharge to home or other facility with appropriate resources  10/2/2022 1347 by Bradly Padilla RN  Outcome: Progressing     Problem: Safety - Adult  Goal: Free from fall injury  10/2/2022 1347 by Bradly Padilla RN  Outcome: Progressing     Gait belt, walker used when ambulating. Problem: Skin/Tissue Integrity  Goal: Absence of new skin breakdown  Description: 1. Monitor for areas of redness and/or skin breakdown  2. Assess vascular access sites hourly  3. Every 4-6 hours minimum:  Change oxygen saturation probe site  4. Every 4-6 hours:  If on nasal continuous positive airway pressure, respiratory therapy assess nares and determine need for appliance change or resting period.   10/2/2022 1347 by Bradly Padilla RN  Outcome: Progressing

## 2022-10-02 NOTE — PROGRESS NOTES
Physical Therapy  Facility/Department: [place] [address]  Physical Therapy Initial Assessment and treatment    Name: Jj Stauffer  : 1950  MRN: [id number]  Date of Service: 10/2/2022    Discharge Recommendations:Arsalan Meza scored a 20/24 on the AM-PAC short mobility form. Current research shows that an AM-PAC score of 18 or greater is typically associated with a discharge to the patient's home setting. Based on the patient's AM-PAC score and their current functional mobility deficits, it is recommended that the patient have 2-3 sessions per week of Physical Therapy at d/c to increase the patient's independence. At this time, this patient demonstrates the endurance and safety to discharge home with OP services and a follow up treatment frequency of 2-3x/wk. Please see assessment section for further patient specific details. If patient discharges prior to next session this note will serve as a discharge summary. Please see below for the latest assessment towards goals. PT Equipment Recommendations  Equipment Needed: No (pt states he has RW)      Patient Diagnosis(es): There were no encounter diagnoses. Past Medical History:  has a past medical history of Acid reflux, BP (high blood pressure), Chronic back pain, Depression, Hyperlipidemia, Neuropathy, and Sleep apnea. Past Surgical History:  has a past surgical history that includes knee surgery (Bilateral); Ankle fracture surgery (Right); shoulder surgery (Bilateral); quadricepsplasty (Right); and pr revise median n/carpal tunnel surg (Right, 2018). Assessment   Body Structures, Functions, Activity Limitations Requiring Skilled Therapeutic Intervention: Decreased functional mobility   Assessment: The pt is a 68 y/o male who presents following a fall with ICH. He frequently falls at home and does not use AD for ambulation. Pt with very impaired balance today without use of AD and with poor safety awareness.  He is impulsive and moves quickly. The pt did agree to use RW upon discharge which he is CGA with and was agreeable to OP PT to work on his balance (preferred over home care PT by pt). Pt to d/c home with 24 hr supervision by his wife. Treatment Diagnosis: impaired mobility 2/2 ICH  Therapy Prognosis: Fair  Decision Making: Medium Complexity  Barriers to Learning: cognition  Requires PT Follow-Up: Yes  Activity Tolerance  Activity Tolerance: Patient tolerated treatment well     Plan   Physcial Therapy Plan  General Plan: 5-7 times per week  Current Treatment Recommendations: Strengthening, Balance training, Functional mobility training, Transfer training, Gait training, Stair training, Neuromuscular re-education, Therapeutic activities  Safety Devices  Type of Devices: Left in chair, Call light within reach, Chair alarm in place, Nurse notified     Restrictions  Position Activity Restriction  Other position/activity restrictions: up with assist     Subjective   General  Chart Reviewed: Yes  Patient assessed for rehabilitation services?: Yes  Additional Pertinent Hx: Caterina Davila is a 67 y.o. male with PMH as below notable for parkinson's disease, alzheimer's disease, hypertension, hyperlipidemia, and GERD, who presented after a fall at home. Pt found to have Right Temporal Lobe Hemorrhagic Contusion 2/2 to unwitnessed fall, likely in setting of Parkinson's. Pt wife states that pt falls \"at least once per week. \"  Family / Caregiver Present: No  Diagnosis: focal hemorrhagic contusion to cerebrum  Follows Commands: Impaired  General Comment  Comments: The pt presents sitting up in recliner and willing to work with therapist.  Subjective  Subjective: \"I have fallen 5-10 times in the last year. But I always make sure I don't hit my head. \"         Social/Functional History  Social/Functional History  Lives With: Spouse (spouse present 24/7)  Type of Home: House  Home Layout: One level  Home Access: Stairs to enter without rails  Entrance Stairs - Number of Steps: 1 with 1 rail  Bathroom Shower/Tub: Walk-in shower  Bathroom Toilet: Standard (sink next to toilet)  Bathroom Equipment: Grab bars in shower, Tub transfer bench  Home Equipment: Walker, rolling, Walker, standard (states he has his father's DME \"a bunch of stuff\")  Has the patient had two or more falls in the past year or any fall with injury in the past year?: Yes (5-10; losing his balance)  ADL Assistance: Independent  Homemaking Assistance: Needs assistance (wife does all)  Homemaking Responsibilities: No  Ambulation Assistance: Independent  Transfer Assistance: Independent  Active : Yes  Occupation: Retired  Type of Occupation:   Additional Comments: he does grocery shopping with motorized cart. Has not had PT  Vision/Hearing  Vision  Vision: Impaired  Vision Exceptions: Wears glasses for distance  Hearing  Hearing: Within functional limits    Cognition   Orientation  Overall Orientation Status: Within Normal Limits  Cognition  Overall Cognitive Status: Exceptions  Cognition Comment: Pt impulsive and moves quickly with decreased safety awareness despite cueing. He notes his wife tells him he moves too quickly too. Objective   Heart Rate: 58  Heart Rate Source: Monitor  BP: 137/85  BP Location: Right upper arm  BP Method: Automatic  Patient Position: Sitting  MAP (Calculated): 102.33  Resp: 18  SpO2: 97 %  O2 Device: None (Room air)       Balance  Sitting: Intact  Standing: With support  Transfer Training  Transfer Training: Yes  Overall Level of Assistance: Contact-guard assistance  Interventions: Verbal cues; Safety awareness training (hand placement and use of walker at all times during transfer; turning with walker)  Sit to Stand: Contact-guard assistance  Stand to Sit: Contact-guard assistance  Toilet Transfer: Contact-guard assistance  Gait Training  Gait Training: Yes  Gait  Overall Level of Assistance:  Moderate assistance;Contact-guard assistance (Pt mod A for balance without RW, CGA with RW)  Interventions: Verbal cues; Safety awareness training  Base of Support: Widened  Speed/Aleena: Accelerated  Gait Abnormalities: Decreased step clearance (R LE>L LE)  Distance (ft): 5 Feet (+300')  Assistive Device: Walker, rolling (none for inital 5' but then instituted walker use as pt was unsteady)  Stairs - Level of Assistance: Contact-guard assistance  Number of Stairs Trained: 1 (curb step with use of walker; pt impulsive and assist to instruct him on using the walker appropriately for the step)  Curbs/Ramps: Contact-guard assistance              Balance  Sitting - Static: Good  Sitting - Dynamic: Fair;+  Standing - Static: Fair;-  Standing - Dynamic: Poor  Comments: Pt mod A without AD and CGA for ambulation with RW; SBA-supervision for sitting balance           AM-PAC Score  AM-PAC Inpatient Mobility Raw Score : 20 (10/02/22 0956)  AM-PAC Inpatient T-Scale Score : 47.67 (10/02/22 0956)  Mobility Inpatient CMS 0-100% Score: 35.83 (10/02/22 0956)  Mobility Inpatient CMS G-Code Modifier : CJ (10/02/22 0724)       Goals  Short Term Goals  Time Frame for Short Term Goals: By discharge  Short Term Goal 1: The pt will perform bed mobility with independence. Short Term Goal 2: The pt will transfer with supervision and appropriate use of RW  Short Term Goal 3: The pt will ambulate with RW x 200' and supervision with appropriate gait speed  Short Term Goal 4: The pt will go up/down curb step with RW and SBA  Patient Goals   Patient Goals :  To go home       Education  Patient Education  Education Given To: Patient  Education Provided: Role of Therapy;Transfer Training;Plan of Care;Equipment  Education Provided Comments: use of RW appropriately; moving slowly  Education Method: Verbal;Demonstration  Barriers to Learning: Cognition  Education Outcome: Continued education needed;Verbalized understanding      Therapy Time   Individual Concurrent Group Co-treatment

## 2022-10-02 NOTE — PROGRESS NOTES
Occupational Therapy  Facility/Department: [place] [address]  Occupational Therapy Initial Assessment/ Treatment    Name: Lenoard Moritz  : 1950  MRN: [id number]  Date of Service: 10/2/2022    Discharge Recommendations:     OT Equipment Recommendations  Equipment Needed: No   Lenoard Moritz scored a 20/24 on the AM-PAC ADL Inpatient form. At this time, no further OT is recommended upon discharge. Recommend patient returns to prior setting with prior services. Patient Diagnosis(es): There were no encounter diagnoses. Past Medical History:  has a past medical history of Acid reflux, BP (high blood pressure), Chronic back pain, Depression, Hyperlipidemia, Neuropathy, and Sleep apnea. Past Surgical History:  has a past surgical history that includes knee surgery (Bilateral); Ankle fracture surgery (Right); shoulder surgery (Bilateral); quadricepsplasty (Right); and pr revise median n/carpal tunnel surg (Right, 2018). Treatment Diagnosis: decreased ADLs and transfers secondary to fall      Assessment   Performance deficits / Impairments: Decreased functional mobility ; Decreased endurance;Decreased coordination;Decreased ADL status; Decreased balance;Decreased strength;Decreased high-level IADLs;Decreased cognition  Assessment: Prior to admission pt was living at home with his wife, was independent with ADLs and mobiltiy without AE. Pt now presents s/p fall and ICH, now below his baseline. Pt demonstrating mod A for mobiltiy without AE, CGA with RW. CGA for toilet transfers and pt required cueing throughout for safe transfer techniques. Pt demonstrated impulsivity throughout session. Would benefit from continued OT while in acute care, continue OT POC.   Treatment Diagnosis: decreased ADLs and transfers secondary to fall  Prognosis: Fair  Decision Making: Medium Complexity  REQUIRES OT FOLLOW-UP: Yes  Activity Tolerance  Activity Tolerance: Patient Tolerated treatment well        Plan Occupational Therapy Plan  Times Per Week: 5-7  Current Treatment Recommendations: Strengthening, Balance training, Functional mobility training, Endurance training, Neuromuscular re-education, Self-Care / ADL, Safety education & training, Patient/Caregiver education & training, Coordination training     Restrictions  Position Activity Restriction  Other position/activity restrictions: up with assist    Subjective   General  Chart Reviewed: Yes  Patient assessed for rehabilitation services?: Yes  Additional Pertinent Hx: Pt admitted to ED with c/o fall at home. Per katie review pt has history of parkinson's disease and lewy body dementia. Head CT showed right temporal ICH. NSGY following, no surgical intervention recommended. PMHx includes: high blood pressure, Chronic back pain, Depression, Hyperlipidemia, Neuropathy, and Sleep apnea. Family / Caregiver Present: No  Referring Practitioner: Gina Page MD  Diagnosis: R temporal lobe mass  Subjective  Subjective: Pt semi supine in bed upon arrival, agreeable to OT eval and treat.      Social/Functional History  Social/Functional History  Lives With: Spouse (spouse present 24/7)  Type of Home: House  Home Layout: One level  Home Access: Stairs to enter without rails  Entrance Stairs - Number of Steps: 1 with 1 rail  Bathroom Shower/Tub: Walk-in shower  Bathroom Toilet: Standard (sink next to toilet)  Bathroom Equipment: Grab bars in shower, Tub transfer bench  Home Equipment: Walker, rolling, Walker, standard (states he has his father's DME \"a bunch of stuff\")  Has the patient had two or more falls in the past year or any fall with injury in the past year?: Yes (5-10; losing his balance)  ADL Assistance: Independent  Homemaking Assistance: Needs assistance (wife does all)  Homemaking Responsibilities: No  Ambulation Assistance: Independent  Transfer Assistance: Independent  Active : Yes  Occupation: Retired  Type of Occupation:   Additional Comments: he does grocery shopping with motorized cart. Has not had PT       Objective              Safety Devices  Type of Devices: Left in chair;Call light within reach; Chair alarm in place  Balance  Sitting: Intact  Standing: With support  Transfer Training  Transfer Training: Yes  Overall Level of Assistance: Contact-guard assistance  Interventions: Verbal cues; Safety awareness training (hand placement and use of walker at all times during transfer; turning with walker)  Sit to Stand: Contact-guard assistance  Stand to Sit: Contact-guard assistance  Toilet Transfer: Contact-guard assistance  Gait Training  Gait Training: Yes  Gait  Overall Level of Assistance: Moderate assistance;Contact-guard assistance (Pt mod A for balance without RW, CGA with RW)  Interventions: Verbal cues; Safety awareness training  Base of Support: Widened  Speed/Aleena: Accelerated  Gait Abnormalities: Decreased step clearance (R LE>L LE)  Distance (ft): 5 Feet (+300')  Assistive Device: Walker, rolling (none for inital 5' but then instituted walker use as pt was unsteady)  Stairs - Level of Assistance: Contact-guard assistance  Number of Stairs Trained: 1 (curb step with use of walker; pt impulsive and assist to instruct him on using the walker appropriately for the step)  Curbs/Ramps: Contact-guard assistance  Toilet Transfers  Toilet - Technique: Ambulating  Equipment Used: Standard toilet  Toilet Transfer: Contact guard assistance  Toilet Transfers Comments: cueing to use rails  AROM: Generally decreased, functional  Strength: Generally decreased, functional  ADL  Toileting: Contact guard assistance  Toileting Skilled Clinical Factors: simulated as pt did not have urge to void           Transfers  Sit to stand: Stand by assistance  Stand to sit: Stand by assistance  Transfer Comments: pt impulsive throughout transfers, standing prior to therapy bringing walker to pt. Pt required verbal cueing for safe hand placement during transfers. Mobility short distance from bed to walker, noted instability while walking without AE and mod A. With walker CGA- SBA in hallway greater than household distance. Vision  Vision: Impaired  Vision Exceptions: Wears glasses for distance  Hearing  Hearing: Within functional limits  Cognition  Overall Cognitive Status: Exceptions  Cognition Comment: Pt impulsive and moves quickly with decreased safety awareness despite cueing. He notes his wife tells him he moves too quickly too. Orientation  Overall Orientation Status: Within Normal Limits                  Education Given To: Patient  Education Provided: Role of Therapy;Plan of Care  Education Method: Verbal  Barriers to Learning: Cognition  Education Outcome: Continued education needed  Treatment included functional transfer training, ADLs, and patient education. AM-PAC Score        AM-PAC Inpatient Daily Activity Raw Score: 20 (10/02/22 0951)  AM-PAC Inpatient ADL T-Scale Score : 42.03 (10/02/22 0951)  ADL Inpatient CMS 0-100% Score: 38.32 (10/02/22 0951)  ADL Inpatient CMS G-Code Modifier : CJ (10/02/22 0951)    Tinneti Score       Goals  Short Term Goals  Time Frame for Short Term Goals: by dc  Short Term Goal 1: Pt will complete LB dressing with spv  Short Term Goal 2: Pt will complete toileting and toilet transfer with spv  Short Term Goal 3: Pt will complete standing level grooming with spv  Patient Goals   Patient goals : to go home       Therapy Time   Individual Concurrent Group Co-treatment   Time In 0916         Time Out 0940         Minutes 24         Timed Code Treatment Minutes: 9 Minutes (+15 min eval)     Shari DURANT  [address] [facility], OTR/L [id number]

## 2022-10-02 NOTE — DISCHARGE INSTRUCTIONS
Please follow up with you primary care physician in 10 days. Additionally please follow up with Neurosurgery (Dr. Swati Reed) in 2 weeks. Please call to make these appointments. Please note, you will discontinue the use of Aspirin and Celexib (NSAIDs) for now, resumption will be determined during your visit with neurosurgery in 2 weeks.    The full list of medications that you will be newly prescribed, continue taking, that have changed doses, or discontinue taking can be seen in full in the medications list.

## 2022-10-17 ENCOUNTER — HOSPITAL ENCOUNTER (OUTPATIENT)
Dept: CT IMAGING | Age: 72
Discharge: HOME | End: 2022-10-17
Payer: MEDICARE

## 2022-10-17 DIAGNOSIS — I62.9 INTRACRANIAL HEMORRHAGE (HCC): ICD-10-CM

## 2022-10-17 PROCEDURE — 70450 CT HEAD/BRAIN W/O DYE: CPT

## 2023-05-03 ENCOUNTER — HOSPITAL ENCOUNTER (OUTPATIENT)
Dept: ULTRASOUND IMAGING | Age: 73
Discharge: HOME | End: 2023-05-03
Payer: MEDICARE

## 2023-05-03 ENCOUNTER — HOSPITAL ENCOUNTER (OUTPATIENT)
Age: 73
Setting detail: SPECIMEN
Discharge: HOME | End: 2023-05-03
Payer: MEDICARE

## 2023-05-03 DIAGNOSIS — K75.9 HEPATITIS: ICD-10-CM

## 2023-05-03 LAB
ALBUMIN SERPL-MCNC: 3.9 G/DL (ref 3.4–5)
ALP SERPL-CCNC: 124 U/L (ref 40–129)
ALT SERPL-CCNC: 10 U/L (ref 10–40)
AMMONIA PLAS-SCNC: 33 UMOL/L (ref 16–60)
APTT BLD: 29.5 SEC (ref 22.7–35.9)
AST SERPL-CCNC: 33 U/L (ref 15–37)
BILIRUB DIRECT SERPL-MCNC: <0.2 MG/DL (ref 0–0.3)
BILIRUB INDIRECT SERPL-MCNC: NORMAL MG/DL (ref 0–1)
BILIRUB SERPL-MCNC: <0.2 MG/DL (ref 0–1)
DEPRECATED RDW RBC AUTO: 15.1 % (ref 12.4–15.4)
FERRITIN SERPL IA-MCNC: 214.2 NG/ML (ref 30–400)
HCT VFR BLD AUTO: 42 % (ref 40.5–52.5)
HGB BLD-MCNC: 14 G/DL (ref 13.5–17.5)
INR PPP: 1 (ref 0.84–1.16)
MCH RBC QN AUTO: 31.2 PG (ref 26–34)
MCHC RBC AUTO-ENTMCNC: 33.3 G/DL (ref 31–36)
MCV RBC AUTO: 93.7 FL (ref 80–100)
PLATELET # BLD AUTO: 187 K/UL (ref 135–450)
PMV BLD AUTO: 10.6 FL (ref 5–10.5)
PROT SERPL-MCNC: 6.9 G/DL (ref 6.4–8.2)
PROTHROMBIN TIME: 13.2 SEC (ref 11.5–14.8)
RBC # BLD AUTO: 4.48 M/UL (ref 4.2–5.9)
WBC # BLD AUTO: 8.5 K/UL (ref 4–11)

## 2023-05-03 PROCEDURE — 82140 ASSAY OF AMMONIA: CPT

## 2023-05-03 PROCEDURE — 82390 ASSAY OF CERULOPLASMIN: CPT

## 2023-05-03 PROCEDURE — 86015 ACTIN ANTIBODY EACH: CPT

## 2023-05-03 PROCEDURE — 85027 COMPLETE CBC AUTOMATED: CPT

## 2023-05-03 PROCEDURE — 93975 VASCULAR STUDY: CPT

## 2023-05-03 PROCEDURE — 86038 ANTINUCLEAR ANTIBODIES: CPT

## 2023-05-03 PROCEDURE — 76705 ECHO EXAM OF ABDOMEN: CPT

## 2023-05-03 PROCEDURE — 82105 ALPHA-FETOPROTEIN SERUM: CPT

## 2023-05-03 PROCEDURE — 82103 ALPHA-1-ANTITRYPSIN TOTAL: CPT

## 2023-05-03 PROCEDURE — 80076 HEPATIC FUNCTION PANEL: CPT

## 2023-05-03 PROCEDURE — 36415 COLL VENOUS BLD VENIPUNCTURE: CPT

## 2023-05-03 PROCEDURE — 83516 IMMUNOASSAY NONANTIBODY: CPT

## 2023-05-03 PROCEDURE — 82728 ASSAY OF FERRITIN: CPT

## 2023-05-03 PROCEDURE — 85730 THROMBOPLASTIN TIME PARTIAL: CPT

## 2023-05-03 PROCEDURE — 85610 PROTHROMBIN TIME: CPT

## 2023-05-04 LAB — ANA SER QL IA: NEGATIVE

## 2023-05-05 LAB — SMA IGG SER-ACNC: 9 UNITS (ref 0–19)

## 2023-05-06 LAB
A1AT SERPL-MCNC: 158 MG/DL (ref 90–200)
AFP-TM SERPL-MCNC: 3 UG/L
CERULOPLASMIN SERPL-MCNC: 24 MG/DL (ref 15–30)

## 2023-05-09 LAB — MITOCHONDRIA M2 AB SER IA-ACNC: 1.1 U/ML (ref 0–4)

## 2024-04-18 NOTE — PROGRESS NOTES
INJECTION ADMINISTRATION DETAILS:    Date & Time:  10/17/18 1:12 PM  Site & Comments: right hand  Administered by Dr Gary Steele    Betamethasone (30mg/mL)  #of CC:1  NDC #: 6593-1274-70  Lot #: [id number]  EXP: 11/2019    1% Lidocaine ( 10mg/mL)  # of CC : 1  NDC #: 6601-8963-67  LOT# :[id number]  EXP :5/2019 Attending Only

## 2024-10-16 ENCOUNTER — OFFICE VISIT (OUTPATIENT)
Dept: ENT CLINIC | Age: 74
End: 2024-10-16
Payer: MEDICARE

## 2024-10-16 VITALS
WEIGHT: 246 LBS | DIASTOLIC BLOOD PRESSURE: 87 MMHG | SYSTOLIC BLOOD PRESSURE: 128 MMHG | BODY MASS INDEX: 33.32 KG/M2 | HEART RATE: 87 BPM | HEIGHT: 72 IN

## 2024-10-16 DIAGNOSIS — G20.A1 PARKINSON'S DISEASE, UNSPECIFIED WHETHER DYSKINESIA PRESENT, UNSPECIFIED WHETHER MANIFESTATIONS FLUCTUATE (HCC): ICD-10-CM

## 2024-10-16 DIAGNOSIS — R49.0 DYSPHONIA: Primary | ICD-10-CM

## 2024-10-16 PROCEDURE — 99203 OFFICE O/P NEW LOW 30 MIN: CPT | Performed by: OTOLARYNGOLOGY

## 2024-10-16 PROCEDURE — 1036F TOBACCO NON-USER: CPT | Performed by: OTOLARYNGOLOGY

## 2024-10-16 PROCEDURE — G8484 FLU IMMUNIZE NO ADMIN: HCPCS | Performed by: OTOLARYNGOLOGY

## 2024-10-16 PROCEDURE — 3017F COLORECTAL CA SCREEN DOC REV: CPT | Performed by: OTOLARYNGOLOGY

## 2024-10-16 PROCEDURE — G8427 DOCREV CUR MEDS BY ELIG CLIN: HCPCS | Performed by: OTOLARYNGOLOGY

## 2024-10-16 PROCEDURE — G8417 CALC BMI ABV UP PARAM F/U: HCPCS | Performed by: OTOLARYNGOLOGY

## 2024-10-16 PROCEDURE — 1123F ACP DISCUSS/DSCN MKR DOCD: CPT | Performed by: OTOLARYNGOLOGY

## 2024-10-16 PROCEDURE — 31575 DIAGNOSTIC LARYNGOSCOPY: CPT | Performed by: OTOLARYNGOLOGY

## 2024-10-16 RX ORDER — TAMSULOSIN HYDROCHLORIDE 0.4 MG/1
0.4 CAPSULE ORAL DAILY
COMMUNITY

## 2024-10-16 RX ORDER — CELECOXIB 100 MG/1
200 CAPSULE ORAL DAILY
COMMUNITY

## 2024-10-16 ASSESSMENT — ENCOUNTER SYMPTOMS
TROUBLE SWALLOWING: 0
VOICE CHANGE: 1
SHORTNESS OF BREATH: 0
APNEA: 0
COUGH: 0
EYE ITCHING: 0
FACIAL SWELLING: 0
SINUS PRESSURE: 0
SORE THROAT: 0

## 2024-10-16 NOTE — PROGRESS NOTES
[facility]  [address], [address]  [place] [address]  P: [phone]       Patient     Arsalan Silverio  1950    ChiefComplaint     Chief Complaint   Patient presents with    New Patient    Hoarse     Lost voice about 1 year ago        History of Present Illness     Arsalan 74-year-old male here today for evaluation of dysphonia.  Wife reports he has advanced Parkinson's and approximately 1 year gait, voice became quiet.  Facility may require an evaluation by ENT prior to starting therapy.  Wife reports he eats and drinks without difficulty.    Past Medical History     Past Medical History:   Diagnosis Date    Acid reflux     BP (high blood pressure)     Chronic back pain 2016    [place] fishing [place]     Depression     Hyperlipidemia     Neuropathy     Sleep apnea     uses CPAP       Past Surgical History     Past Surgical History:   Procedure Laterality Date    ANKLE FRACTURE SURGERY Right     KNEE SURGERY Bilateral     UT NEUROPLASTY &/TRANSPOS MEDIAN NRV CARPAL TUNNE Right 9/6/2018    RIGHT CARPAL TUNNEL RELEASE performed by Palomo Pollack MD at [facility] OR    QUADRICEPSPLASTY Right     SHOULDER SURGERY Bilateral        Family History     History reviewed. No pertinent family history.    Social History     Social History     Tobacco Use    Smoking status: Former     Average packs/day: 0.5 packs/day for 49.0 years (24.5 ttl pk-yrs)     Types: Cigarettes     Start date: 4/11/1965     Quit date: 4/11/2014     Years since quitting: 10.5    Smokeless tobacco: Never   Substance Use Topics    Alcohol use: No    Drug use: Yes     Types: Marijuana (Weed)        Allergies     Allergies   Allergen Reactions    Adhesive Tape Other (See Comments)     blisters    Oxycodone-Acetaminophen Itching    Percodan [Oxycodone-Aspirin] Itching       Medications     Current Outpatient Medications   Medication Sig Dispense Refill    tamsulosin (FLOMAX) 0.4 MG capsule Take 1 capsule by mouth

## 2024-11-27 ENCOUNTER — TELEPHONE (OUTPATIENT)
Dept: NEUROLOGY | Age: 74
End: 2024-11-27

## 2024-11-27 ENCOUNTER — OFFICE VISIT (OUTPATIENT)
Dept: NEUROLOGY | Age: 74
End: 2024-11-27
Payer: MEDICARE

## 2024-11-27 VITALS
HEART RATE: 63 BPM | WEIGHT: 246 LBS | DIASTOLIC BLOOD PRESSURE: 62 MMHG | OXYGEN SATURATION: 98 % | SYSTOLIC BLOOD PRESSURE: 104 MMHG | BODY MASS INDEX: 33.32 KG/M2 | HEIGHT: 72 IN

## 2024-11-27 DIAGNOSIS — K11.7 DROOLING: Primary | ICD-10-CM

## 2024-11-27 DIAGNOSIS — G20.B2 PARKINSON'S DISEASE WITH DYSKINESIA AND FLUCTUATING MANIFESTATIONS (HCC): ICD-10-CM

## 2024-11-27 DIAGNOSIS — T88.7XXA SIDE EFFECT OF MEDICATION: ICD-10-CM

## 2024-11-27 PROCEDURE — G8417 CALC BMI ABV UP PARAM F/U: HCPCS | Performed by: PSYCHIATRY & NEUROLOGY

## 2024-11-27 PROCEDURE — G8427 DOCREV CUR MEDS BY ELIG CLIN: HCPCS | Performed by: PSYCHIATRY & NEUROLOGY

## 2024-11-27 PROCEDURE — 1036F TOBACCO NON-USER: CPT | Performed by: PSYCHIATRY & NEUROLOGY

## 2024-11-27 PROCEDURE — 3017F COLORECTAL CA SCREEN DOC REV: CPT | Performed by: PSYCHIATRY & NEUROLOGY

## 2024-11-27 PROCEDURE — G8484 FLU IMMUNIZE NO ADMIN: HCPCS | Performed by: PSYCHIATRY & NEUROLOGY

## 2024-11-27 PROCEDURE — 99204 OFFICE O/P NEW MOD 45 MIN: CPT | Performed by: PSYCHIATRY & NEUROLOGY

## 2024-11-27 PROCEDURE — 1123F ACP DISCUSS/DSCN MKR DOCD: CPT | Performed by: PSYCHIATRY & NEUROLOGY

## 2024-11-27 PROCEDURE — 1159F MED LIST DOCD IN RCRD: CPT | Performed by: PSYCHIATRY & NEUROLOGY

## 2024-11-27 RX ORDER — LANOLIN ALCOHOL/MO/W.PET/CERES
1000 CREAM (GRAM) TOPICAL DAILY
COMMUNITY

## 2024-11-27 RX ORDER — LEVODOPA AND CARBIDOPA 245; 61.25 MG/1; MG/1
1 CAPSULE, EXTENDED RELEASE ORAL 2 TIMES DAILY
Qty: 60 CAPSULE | Refills: 2 | Status: SHIPPED | OUTPATIENT
Start: 2024-11-27

## 2024-11-27 RX ORDER — POLYETHYLENE GLYCOL 3350 17 G/17G
17 POWDER, FOR SOLUTION ORAL DAILY
COMMUNITY

## 2024-11-27 RX ORDER — LIDOCAINE 50 MG/G
1 PATCH TOPICAL DAILY
COMMUNITY

## 2024-11-27 RX ORDER — PIMAVANSERIN TARTRATE 34 MG/1
34 CAPSULE ORAL DAILY
COMMUNITY

## 2024-11-27 RX ORDER — HYDROCODONE BITARTRATE AND ACETAMINOPHEN 5; 325 MG/1; MG/1
1 TABLET ORAL EVERY 8 HOURS PRN
COMMUNITY

## 2024-11-27 RX ORDER — LISINOPRIL 10 MG/1
10 TABLET ORAL DAILY
COMMUNITY

## 2024-11-27 NOTE — PROGRESS NOTES
Neurology outpatient new visit    Patient name: Arsalan Silverio      Chief Complaint:  Parkinson disease.    History of present illness:  This is a 74 years old right-handed male.  The patient is here to establish care for Parkinson disease.  The patient has been diagnosed with Parkinson disease more than 5 years.  However, the patient has been on medication intermittently as the patient reported significant side effect from Sinemet in the past.  The patient is currently on Sinemet 100/25 5 tablets a day.  The patient is also taking Nuplazid for behavioral disturbance.  The patient is also noted for significant drooling today.    Past medical history:    Past Medical History:   Diagnosis Date    Acid reflux     BP (high blood pressure)     Chronic back pain 2016    fell [place] a fishing dock     Depression     Hyperlipidemia     Neuropathy     Sleep apnea     uses CPAP       Past surgical history:    Past Surgical History:   Procedure Laterality Date    ANKLE FRACTURE SURGERY Right     KNEE SURGERY Bilateral     [place] NEUROPLASTY &/TRANSPOS MEDIAN NRV CARPAL TUNNE Right 9/6/2018    RIGHT CARPAL TUNNEL RELEASE performed by Palomo Pollack MD at [facility] OR    QUADRICEPSPLASTY Right     SHOULDER SURGERY Bilateral         Medication:    Current Outpatient Medications   Medication Sig Dispense Refill    vitamin B-12 (CYANOCOBALAMIN) 1000 MCG tablet Take 1 tablet by mouth daily      lidocaine (LIDODERM) 5 % Place 1 patch onto the skin daily 12 hours on, 12 hours off.      lisinopril (PRINIVIL;ZESTRIL) 10 MG tablet Take 1 tablet by mouth daily      polyethylene glycol (MIRALAX) 17 g PACK packet Take 17 g by mouth daily      pimavanserin tartrate (NUPLAZID) 34 MG CAPS capsule Take 1 capsule by mouth daily      Nutritional Supplements (PROSTATE PO) Take 30 mg by mouth daily (with breakfast)      HYDROcodone-acetaminophen (NORCO) 5-325 MG per tablet Take 1 tablet by mouth every 8 hours as needed for Pain. Max Daily

## 2024-11-27 NOTE — PATIENT INSTRUCTIONS
YOU MUST CONFIRM YOUR APPOINTMENT 1 DAY PRIOR OR IT WILL BE CANCELLED!!   Our office will call you 3 times the day prior to your appointment in an attempt to confirm.  Please return our call ASAP or confirm your appt through [employer] no later than 3 pm the day before your appointment.  If we do not hear back from you by 3 pm to confirm, your appointment will be cancelled & someone will be added into that slot from our wait list.

## 2024-12-03 NOTE — TELEPHONE ENCOUNTER
Info faxed to [facility] yesterday to run a test claim since correspondence from [facility] are not clear on whether the Botox has been approved.

## 2024-12-03 NOTE — TELEPHONE ENCOUNTER
Per [place], they are out of network.  Will send Rx to [facility] in hopes that they can fill for this pt.

## 2024-12-04 NOTE — TELEPHONE ENCOUNTER
I called [facility] today at [phone] to see if they are able to fill Botox for Mr. Silverio.  They processed the claim and it's showing $0 copay.  Delivery date scheduled for 12/10/24.  LM for pt's wife offering appt next Wed 12/11 in [place].  Will await CB.

## 2024-12-11 NOTE — TELEPHONE ENCOUNTER
Botox received today.  Office will reach out to pt's family 1 more time to attempt to schedule injection.  After that, we will not continue to reach out and will await his 3 mo f/u in late Feb.

## 2024-12-12 NOTE — TELEPHONE ENCOUNTER
I left our 3rd and final message for pt's spouse, Shirley, to call and schedule Evan's Botox.  Mentioned in my message that if she doesn't call back, we'll bring the vial to his appt in late-Feb and can do the injections then.  Office is closing this encounter.

## 2025-02-27 ENCOUNTER — OFFICE VISIT (OUTPATIENT)
Age: 75
End: 2025-02-27

## 2025-02-27 VITALS
SYSTOLIC BLOOD PRESSURE: 96 MMHG | OXYGEN SATURATION: 99 % | HEART RATE: 75 BPM | DIASTOLIC BLOOD PRESSURE: 58 MMHG | HEIGHT: 72 IN | BODY MASS INDEX: 33.36 KG/M2

## 2025-02-27 DIAGNOSIS — G20.B2 PARKINSON'S DISEASE WITH DYSKINESIA AND FLUCTUATING MANIFESTATIONS (HCC): Primary | ICD-10-CM

## 2025-02-27 DIAGNOSIS — K11.7 DROOLING: ICD-10-CM

## 2025-02-27 RX ORDER — ONDANSETRON 4 MG/1
4 TABLET, FILM COATED ORAL EVERY 8 HOURS PRN
COMMUNITY

## 2025-02-27 RX ORDER — DIPHENHYDRAMINE HCL 50 MG
50 CAPSULE ORAL EVERY 6 HOURS PRN
COMMUNITY

## 2025-02-27 RX ORDER — OXYCODONE AND ACETAMINOPHEN 5; 325 MG/1; MG/1
1 TABLET ORAL EVERY 4 HOURS PRN
COMMUNITY

## 2025-02-27 RX ORDER — SUMATRIPTAN 6 MG/.5ML
6 INJECTION, SOLUTION SUBCUTANEOUS
COMMUNITY

## 2025-02-27 RX ORDER — OXYCODONE HYDROCHLORIDE 5 MG/1
5 CAPSULE ORAL EVERY 4 HOURS PRN
COMMUNITY

## 2025-02-27 RX ORDER — FUROSEMIDE 40 MG/1
40 TABLET ORAL 2 TIMES DAILY
COMMUNITY

## 2025-02-27 RX ORDER — LOPERAMIDE HYDROCHLORIDE 2 MG/1
2 CAPSULE ORAL 4 TIMES DAILY PRN
COMMUNITY

## 2025-02-27 RX ORDER — LEVODOPA AND CARBIDOPA 245; 61.25 MG/1; MG/1
1 CAPSULE, EXTENDED RELEASE ORAL 3 TIMES DAILY
Qty: 90 CAPSULE | Refills: 2 | Status: SHIPPED | OUTPATIENT
Start: 2025-02-27

## 2025-02-27 RX ORDER — PIMAVANSERIN TARTRATE 34 MG/1
34 CAPSULE ORAL DAILY
COMMUNITY

## 2025-02-27 NOTE — PROGRESS NOTES
Botox for Drooling    Indication:    Drooling (underlying Parkinson's disease)    Technique:    8 sites are injected with 6.25 unites each. Total dose of 50 units each session.  Injections should be done at least 12 weeks after the prior injection to avoid antibody formation.    Preparation:    While the patient is setting in a chair, examine the patient to identify the landmarks (listed below).  Withdraw 4 ml of normal saline and smoothly inject it into the Botox vial.  Using a 1.5\" 25G needle, withdraw the reconstituted Botox into a 1ml syringe then switch the needle to 30G 0.5\". This way, each 10 units of the syringe (0.1ml) will contain 5 units of Botox.  Adjust the needle so the bevel faces the same direction as the syringe marks.    Injection:    Make sure the bevel is facing up, aspirate before injection, try to inject in the superficial layer of the muscle (once you feel the loss of resistance, when needle cross the dermis).

## 2025-02-27 NOTE — PROGRESS NOTES
Neurology outpatient new visit    Patient name: Arsalan Silverio      Chief Complaint:  Parkinson disease.    History of present illness:  This is a 74 years old right-handed male.  The patient is here to establish care for Parkinson disease.  The patient has been diagnosed with Parkinson disease more than 5 years.  However, the patient has been on medication intermittently as the patient reported significant side effect from Sinemet in the past.  The patient is currently on Sinemet 100/25 5 tablets a day.  The patient is also taking Nuplazid for behavioral disturbance.  The patient is also noted for significant drooling today.    Past medical history:    Past Medical History:   Diagnosis Date    Acid reflux     BP (high blood pressure)     Chronic back pain 2016    fell off a fishing dock     Depression     Hyperlipidemia     Neuropathy     Sleep apnea     uses CPAP       Past surgical history:    Past Surgical History:   Procedure Laterality Date    ANKLE FRACTURE SURGERY Right     KNEE SURGERY Bilateral     MS NEUROPLASTY &/TRANSPOS MEDIAN NRV CARPAL TUNNE Right 9/6/2018    RIGHT CARPAL TUNNEL RELEASE performed by Palomo Pollack MD at [facility] OR    QUADRICEPSPLASTY Right     SHOULDER SURGERY Bilateral         Medication:    Current Outpatient Medications   Medication Sig Dispense Refill    onabotulinumtoxinA (BOTOX) 100 units injection Inject 100 Units into the muscle every 3 months 1 each 3    vitamin B-12 (CYANOCOBALAMIN) 1000 MCG tablet Take 1 tablet by mouth daily      lidocaine (LIDODERM) 5 % Place 1 patch onto the skin daily 12 hours on, 12 hours off.      lisinopril (PRINIVIL;ZESTRIL) 10 MG tablet Take 1 tablet by mouth daily      polyethylene glycol (MIRALAX) 17 g PACK packet Take 17 g by mouth daily      pimavanserin tartrate (NUPLAZID) 34 MG CAPS capsule Take 1 capsule by mouth daily      Nutritional Supplements (PROSTATE PO) Take 30 mg by mouth daily (with breakfast)

## 2025-02-27 NOTE — PROGRESS NOTES
Neurology outpatient F/U visit    Patient name: Arsalan Silverio      Chief Complaint:  Parkinson disease.    History of present illness:  This is a 74 years old right-handed male.  The patient is here to establish care for Parkinson disease.  The patient has been diagnosed with Parkinson disease more than 5 years.  However, the patient has been on medication intermittently as the patient reported significant side effect from Sinemet in the past.  The patient is currently on Sinemet 100/25 5 tablets a day.  The patient is also taking Nuplazid for behavioral disturbance.  The patient is also noted for significant drooling today.    Interval History:  02/27/25: The patient is here for follow-up Parkinson disease.  The patient remains to have significant drooling.  Per family, there is no worsening of tremor or clumsiness with Rytary.  But the patient remains to have significant resting tremor on both hands and cogwheel rigidity.    Past medical history:    Past Medical History:   Diagnosis Date    Acid reflux     BP (high blood pressure)     Chronic back pain 2016    fell off a fishing dock     Depression     Hyperlipidemia     Neuropathy     Sleep apnea     uses CPAP       Past surgical history:    Past Surgical History:   Procedure Laterality Date    ANKLE FRACTURE SURGERY Right     KNEE SURGERY Bilateral     IN NEUROPLASTY &/TRANSPOS MEDIAN NRV CARPAL TUNNE Right 9/6/2018    RIGHT CARPAL TUNNEL RELEASE performed by Palomo Pollack MD at [facility] OR    QUADRICEPSPLASTY Right     SHOULDER SURGERY Bilateral         Medication:    Current Outpatient Medications   Medication Sig Dispense Refill    FLUoxetine (PROZAC) 20 MG capsule Take 2 capsules by mouth daily      pimavanserin tartrate (NUPLAZID) 34 MG CAPS capsule Take 1 capsule by mouth daily      diphenhydrAMINE (BENADRYL) 50 MG capsule Take 1 capsule by mouth every 6 hours as needed for Itching      SUMAtriptan (IMITREX) 6 MG/0.5ML SOLN injection

## 2025-02-27 NOTE — PATIENT INSTRUCTIONS
YOU MUST CONFIRM YOUR APPOINTMENT 1 DAY PRIOR OR IT WILL BE CANCELLED!!   Our office will call you 3 times the day prior to your appointment in an attempt to confirm.  Please return our call ASAP or confirm your appt through [employer] no later than 3 pm the day before your appointment.  If we do not hear back from you by 3 pm to confirm, your appointment will be cancelled & someone will be added into that slot from our wait list.

## 2025-03-06 ENCOUNTER — TELEPHONE (OUTPATIENT)
Age: 75
End: 2025-03-06

## 2025-03-06 NOTE — TELEPHONE ENCOUNTER
Edison RODRIGUEZ at [facility] called today to state that the patient is becoming more aggressive and agitated since starting the Rytary 61. mg. Started on 2/27/25    They want to know if there is another medication or if they can just stop medication all together as they aren't really seeing any benefit from the medication..     Any changes needs to be told to wife and the 's home.     Please advise

## 2025-03-10 ENCOUNTER — APPOINTMENT (OUTPATIENT)
Dept: CT IMAGING | Age: 75
End: 2025-03-10
Payer: MEDICARE

## 2025-03-10 ENCOUNTER — HOSPITAL ENCOUNTER (EMERGENCY)
Age: 75
Discharge: NURSING, REHAB OR LONG-TERM CARE | End: 2025-03-10
Attending: EMERGENCY MEDICINE
Payer: MEDICARE

## 2025-03-10 VITALS
SYSTOLIC BLOOD PRESSURE: 110 MMHG | WEIGHT: 245 LBS | BODY MASS INDEX: 33.18 KG/M2 | HEIGHT: 72 IN | RESPIRATION RATE: 18 BRPM | DIASTOLIC BLOOD PRESSURE: 70 MMHG | OXYGEN SATURATION: 99 % | HEART RATE: 63 BPM | TEMPERATURE: 97.9 F

## 2025-03-10 DIAGNOSIS — G43.009 MIGRAINE WITHOUT AURA AND WITHOUT STATUS MIGRAINOSUS, NOT INTRACTABLE: Primary | ICD-10-CM

## 2025-03-10 LAB
ALBUMIN SERPL-MCNC: 3.9 G/DL (ref 3.4–5)
ALBUMIN/GLOB SERPL: 1.3 {RATIO} (ref 1.1–2.2)
ALP SERPL-CCNC: 102 U/L (ref 40–129)
ALT SERPL-CCNC: <5 U/L (ref 10–40)
ANION GAP SERPL CALCULATED.3IONS-SCNC: 14 MMOL/L (ref 3–16)
AST SERPL-CCNC: 12 U/L (ref 15–37)
BASOPHILS # BLD: 0.1 K/UL (ref 0–0.2)
BASOPHILS NFR BLD: 0.7 %
BILIRUB SERPL-MCNC: 0.6 MG/DL (ref 0–1)
BUN SERPL-MCNC: 31 MG/DL (ref 7–20)
CALCIUM SERPL-MCNC: 10.3 MG/DL (ref 8.3–10.6)
CHLORIDE SERPL-SCNC: 106 MMOL/L (ref 99–110)
CO2 SERPL-SCNC: 24 MMOL/L (ref 21–32)
CREAT SERPL-MCNC: 0.9 MG/DL (ref 0.8–1.3)
DEPRECATED RDW RBC AUTO: 13.8 % (ref 12.4–15.4)
EOSINOPHIL # BLD: 0.2 K/UL (ref 0–0.6)
EOSINOPHIL NFR BLD: 2.8 %
GFR SERPLBLD CREATININE-BSD FMLA CKD-EPI: 89 ML/MIN/{1.73_M2}
GLUCOSE SERPL-MCNC: 98 MG/DL (ref 70–99)
HCT VFR BLD AUTO: 43.8 % (ref 40.5–52.5)
HGB BLD-MCNC: 14.4 G/DL (ref 13.5–17.5)
LYMPHOCYTES # BLD: 2.1 K/UL (ref 1–5.1)
LYMPHOCYTES NFR BLD: 28.2 %
MCH RBC QN AUTO: 31.1 PG (ref 26–34)
MCHC RBC AUTO-ENTMCNC: 33 G/DL (ref 31–36)
MCV RBC AUTO: 94.2 FL (ref 80–100)
MONOCYTES # BLD: 0.7 K/UL (ref 0–1.3)
MONOCYTES NFR BLD: 8.6 %
NEUTROPHILS # BLD: 4.5 K/UL (ref 1.7–7.7)
NEUTROPHILS NFR BLD: 59.7 %
PLATELET # BLD AUTO: 170 K/UL (ref 135–450)
PMV BLD AUTO: 9.1 FL (ref 5–10.5)
POTASSIUM SERPL-SCNC: 4.6 MMOL/L (ref 3.5–5.1)
PROT SERPL-MCNC: 6.9 G/DL (ref 6.4–8.2)
RBC # BLD AUTO: 4.65 M/UL (ref 4.2–5.9)
SODIUM SERPL-SCNC: 144 MMOL/L (ref 136–145)
WBC # BLD AUTO: 7.6 K/UL (ref 4–11)

## 2025-03-10 PROCEDURE — 99284 EMERGENCY DEPT VISIT MOD MDM: CPT

## 2025-03-10 PROCEDURE — 70450 CT HEAD/BRAIN W/O DYE: CPT

## 2025-03-10 PROCEDURE — 80053 COMPREHEN METABOLIC PANEL: CPT

## 2025-03-10 PROCEDURE — 36415 COLL VENOUS BLD VENIPUNCTURE: CPT

## 2025-03-10 PROCEDURE — 85025 COMPLETE CBC W/AUTO DIFF WBC: CPT

## 2025-03-10 RX ORDER — ONDANSETRON 4 MG/1
8 TABLET, FILM COATED ORAL EVERY 8 HOURS PRN
Qty: 20 TABLET | Refills: 0 | Status: SHIPPED | OUTPATIENT
Start: 2025-03-10

## 2025-03-10 RX ORDER — KETOROLAC TROMETHAMINE 10 MG/1
10 TABLET, FILM COATED ORAL EVERY 6 HOURS PRN
Qty: 20 TABLET | Refills: 0 | Status: SHIPPED | OUTPATIENT
Start: 2025-03-10 | End: 2026-03-10

## 2025-03-10 ASSESSMENT — PAIN DESCRIPTION - DESCRIPTORS: DESCRIPTORS: ACHING;PRESSURE

## 2025-03-10 ASSESSMENT — PAIN DESCRIPTION - FREQUENCY: FREQUENCY: CONTINUOUS

## 2025-03-10 ASSESSMENT — PAIN SCALES - GENERAL: PAINLEVEL_OUTOF10: 10

## 2025-03-10 ASSESSMENT — PAIN DESCRIPTION - ONSET: ONSET: GRADUAL

## 2025-03-10 ASSESSMENT — LIFESTYLE VARIABLES
HOW OFTEN DO YOU HAVE A DRINK CONTAINING ALCOHOL: NEVER
HOW MANY STANDARD DRINKS CONTAINING ALCOHOL DO YOU HAVE ON A TYPICAL DAY: PATIENT DOES NOT DRINK

## 2025-03-10 ASSESSMENT — PAIN - FUNCTIONAL ASSESSMENT: PAIN_FUNCTIONAL_ASSESSMENT: 0-10

## 2025-03-10 ASSESSMENT — PAIN DESCRIPTION - LOCATION: LOCATION: HEAD

## 2025-03-10 ASSESSMENT — PAIN DESCRIPTION - PAIN TYPE: TYPE: ACUTE PAIN

## 2025-03-10 NOTE — ED NOTES
Report given to Heidi virk at [facility] for pt care. Advised 2 prescriptions given for pt's migraines.

## 2025-03-10 NOTE — DISCHARGE INSTRUCTIONS
Take Toradol every 6-8 hours as needed for recurrent headache only  Take Zofran 4 mg tablet take 2 every 6-8 hours for nausea  Also continue to take Benadryl 50 mg capsule every 6-8 hours for headache

## 2025-03-10 NOTE — ED PROVIDER NOTES
[facility] EMERGENCY DEPARTMENT  EMERGENCY DEPARTMENT ENCOUNTER      Pt Name: Arsalan Silverio  MRN: [id number]  Birthdate 1950  Date of evaluation: 3/10/2025  Provider: MARICARMEN CANDELARIA MD    CHIEF COMPLAINT       Chief Complaint   Patient presents with    Migraine     Pt from [facility] with migraine onset last night 2200. Per pt and facility history of migraines but usual meds are not helping. Pt history of dementia.          HISTORY OF PRESENT ILLNESS   (Location/Symptom, Timing/Onset, Context/Setting, Quality, Duration, Modifying Factors, Severity)  Note limiting factors.     Arsalan Silverio is a 75 y.o. male who presents to the emergency department     Patient presents emergency department history of apparently having a headache apparently the details are not known.  When I walked in he is sound asleep and very hard to arouse I think that is a result of having him Rieser receive Phenergan at his extended care facility apparently he got 50 mg IM prior to coming.  He was complaining of a 10/10 headache to the staff he has had headaches before in fact does see a neurologist Dr. 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    REVIEW OF SYSTEMS    (2-9 systems for level 4, 10 or more for level 5)     Review of Systems   Constitutional:  Positive for activity change.   Neurological:  Positive for light-headedness and headaches.   All other systems reviewed and are negative.      Except as noted above the remainder of the review of systems was reviewed and negative.       PAST MEDICAL HISTORY     Past Medical History:

## 2025-03-13 RX ORDER — GABAPENTIN 100 MG/1
100 CAPSULE ORAL 2 TIMES DAILY
Qty: 60 CAPSULE | Refills: 2 | Status: SHIPPED | OUTPATIENT
Start: 2025-03-13 | End: 2025-09-09

## 2025-03-14 NOTE — TELEPHONE ENCOUNTER
[facility] with [facility] I was speaking with maddie Espinoza I was explaining to her the change in meds and line was disconnected. I called back and introduced myself again, line was disconnected

## 2025-03-17 NOTE — TELEPHONE ENCOUNTER
[facility] with [facility] I informed him on starting the gabapentin he verbalized understanding.   Edison is asking if the bisoprolol should be replacing the lisinopril or if he should be on both?   Edison states Pt has not been on verapamil in the past, nor is taking this med> please advislucy Hogan can be reached directly at [phone]

## 2025-03-24 NOTE — TELEPHONE ENCOUNTER
Spoke with Edison whom was informed however states the [facility] doesn't have a last dose of this medication down for patient. Edison is asking what mg of bisoprolol he should be started on

## 2025-03-24 NOTE — TELEPHONE ENCOUNTER
Per Dr. De Oliveira: He should be on bisoprolol again with lisinopril but this is depending on his blood pressure.  His PCP can follow that in the future.

## 2025-03-25 NOTE — TELEPHONE ENCOUNTER
Spoke with Mark whom was informed on medication change, states she will relay this information to pt nurse.

## 2025-03-26 NOTE — TELEPHONE ENCOUNTER
Per Dr. De Oliveira: He can do metoprolol 12.5 mg daily and monitor his heart rate and blood pressure.

## 2025-03-26 NOTE — TELEPHONE ENCOUNTER
Edison called back today saying the pharmacy at the facility where Evan resides cannot get bisoprolol but they can get propranolol and metoprolol.  Would like to know from Dr. De Oliveira if either of those substitutes will do.

## 2025-06-10 ENCOUNTER — HOSPITAL ENCOUNTER (INPATIENT)
Age: 75
LOS: 13 days | Discharge: HOSPICE | DRG: 884 | End: 2025-06-24
Attending: STUDENT IN AN ORGANIZED HEALTH CARE EDUCATION/TRAINING PROGRAM | Admitting: PSYCHIATRY & NEUROLOGY
Payer: MEDICARE

## 2025-06-10 ENCOUNTER — APPOINTMENT (OUTPATIENT)
Dept: GENERAL RADIOLOGY | Age: 75
DRG: 884 | End: 2025-06-10
Payer: MEDICARE

## 2025-06-10 DIAGNOSIS — T88.7XXA SIDE EFFECT OF MEDICATION: ICD-10-CM

## 2025-06-10 DIAGNOSIS — M25.562 CHRONIC PAIN OF LEFT KNEE: ICD-10-CM

## 2025-06-10 DIAGNOSIS — R46.89 AGGRESSIVE BEHAVIOR: Primary | ICD-10-CM

## 2025-06-10 DIAGNOSIS — K11.7 DROOLING: ICD-10-CM

## 2025-06-10 DIAGNOSIS — G89.29 CHRONIC PAIN OF LEFT KNEE: ICD-10-CM

## 2025-06-10 PROCEDURE — 71045 X-RAY EXAM CHEST 1 VIEW: CPT

## 2025-06-10 PROCEDURE — 99285 EMERGENCY DEPT VISIT HI MDM: CPT

## 2025-06-11 ENCOUNTER — APPOINTMENT (OUTPATIENT)
Dept: CT IMAGING | Age: 75
DRG: 884 | End: 2025-06-11
Payer: MEDICARE

## 2025-06-11 PROBLEM — N40.0 BPH (BENIGN PROSTATIC HYPERPLASIA): Status: ACTIVE | Noted: 2025-06-11

## 2025-06-11 PROBLEM — I10 PRIMARY HYPERTENSION: Status: ACTIVE | Noted: 2025-06-11

## 2025-06-11 PROBLEM — G20.A1 PARKINSON DISEASE (HCC): Status: ACTIVE | Noted: 2025-06-11

## 2025-06-11 PROBLEM — F03.918 DEMENTIA WITH BEHAVIORAL DISTURBANCE (HCC): Status: ACTIVE | Noted: 2025-06-11

## 2025-06-11 PROBLEM — R46.89 AGGRESSIVE BEHAVIOR: Status: ACTIVE | Noted: 2025-06-11

## 2025-06-11 LAB
ALBUMIN SERPL-MCNC: 3.2 G/DL (ref 3.4–5)
ALBUMIN/GLOB SERPL: 1.5 {RATIO} (ref 1.1–2.2)
ALP SERPL-CCNC: 84 U/L (ref 40–129)
ALT SERPL-CCNC: 7 U/L (ref 10–40)
ANION GAP SERPL CALCULATED.3IONS-SCNC: 10 MMOL/L (ref 3–16)
APAP SERPL-MCNC: <5 UG/ML (ref 10–30)
AST SERPL-CCNC: 19 U/L (ref 15–37)
BASOPHILS # BLD: 0 K/UL (ref 0–0.2)
BASOPHILS NFR BLD: 0.5 %
BILIRUB SERPL-MCNC: <0.2 MG/DL (ref 0–1)
BUN SERPL-MCNC: 25 MG/DL (ref 7–20)
CALCIUM SERPL-MCNC: 8.9 MG/DL (ref 8.3–10.6)
CHLORIDE SERPL-SCNC: 103 MMOL/L (ref 99–110)
CO2 SERPL-SCNC: 22 MMOL/L (ref 21–32)
CREAT SERPL-MCNC: 0.8 MG/DL (ref 0.8–1.3)
DEPRECATED RDW RBC AUTO: 13.3 % (ref 12.4–15.4)
EOSINOPHIL # BLD: 0.2 K/UL (ref 0–0.6)
EOSINOPHIL NFR BLD: 2.1 %
ETHANOLAMINE SERPL-MCNC: NORMAL MG/DL (ref 0–0.08)
GFR SERPLBLD CREATININE-BSD FMLA CKD-EPI: >90 ML/MIN/{1.73_M2}
GLUCOSE SERPL-MCNC: 87 MG/DL (ref 70–99)
HCT VFR BLD AUTO: 40.5 % (ref 40.5–52.5)
HGB BLD-MCNC: 13.7 G/DL (ref 13.5–17.5)
LYMPHOCYTES # BLD: 2.1 K/UL (ref 1–5.1)
LYMPHOCYTES NFR BLD: 24.5 %
MCH RBC QN AUTO: 31.6 PG (ref 26–34)
MCHC RBC AUTO-ENTMCNC: 33.8 G/DL (ref 31–36)
MCV RBC AUTO: 93.4 FL (ref 80–100)
MONOCYTES # BLD: 0.7 K/UL (ref 0–1.3)
MONOCYTES NFR BLD: 8 %
NEUTROPHILS # BLD: 5.6 K/UL (ref 1.7–7.7)
NEUTROPHILS NFR BLD: 64.9 %
PLATELET # BLD AUTO: 162 K/UL (ref 135–450)
PMV BLD AUTO: 8.9 FL (ref 5–10.5)
POTASSIUM SERPL-SCNC: 4 MMOL/L (ref 3.5–5.1)
PROT SERPL-MCNC: 5.4 G/DL (ref 6.4–8.2)
RBC # BLD AUTO: 4.34 M/UL (ref 4.2–5.9)
SALICYLATES SERPL-MCNC: <0.5 MG/DL (ref 15–30)
SODIUM SERPL-SCNC: 135 MMOL/L (ref 136–145)
TSH SERPL DL<=0.005 MIU/L-ACNC: 0.85 UIU/ML (ref 0.27–4.2)
WBC # BLD AUTO: 8.7 K/UL (ref 4–11)

## 2025-06-11 PROCEDURE — 83036 HEMOGLOBIN GLYCOSYLATED A1C: CPT

## 2025-06-11 PROCEDURE — 6360000002 HC RX W HCPCS: Performed by: PSYCHIATRY & NEUROLOGY

## 2025-06-11 PROCEDURE — 90791 PSYCH DIAGNOSTIC EVALUATION: CPT

## 2025-06-11 PROCEDURE — 80053 COMPREHEN METABOLIC PANEL: CPT

## 2025-06-11 PROCEDURE — 36415 COLL VENOUS BLD VENIPUNCTURE: CPT

## 2025-06-11 PROCEDURE — 80179 DRUG ASSAY SALICYLATE: CPT

## 2025-06-11 PROCEDURE — 85025 COMPLETE CBC W/AUTO DIFF WBC: CPT

## 2025-06-11 PROCEDURE — 1240000000 HC EMOTIONAL WELLNESS R&B

## 2025-06-11 PROCEDURE — 70450 CT HEAD/BRAIN W/O DYE: CPT

## 2025-06-11 PROCEDURE — 99221 1ST HOSP IP/OBS SF/LOW 40: CPT

## 2025-06-11 PROCEDURE — 84443 ASSAY THYROID STIM HORMONE: CPT

## 2025-06-11 PROCEDURE — 2500000003 HC RX 250 WO HCPCS: Performed by: PSYCHIATRY & NEUROLOGY

## 2025-06-11 PROCEDURE — 82077 ASSAY SPEC XCP UR&BREATH IA: CPT

## 2025-06-11 PROCEDURE — 80061 LIPID PANEL: CPT

## 2025-06-11 PROCEDURE — 80143 DRUG ASSAY ACETAMINOPHEN: CPT

## 2025-06-11 RX ORDER — IBUPROFEN 400 MG/1
400 TABLET, FILM COATED ORAL EVERY 6 HOURS PRN
Status: DISCONTINUED | OUTPATIENT
Start: 2025-06-11 | End: 2025-06-24 | Stop reason: HOSPADM

## 2025-06-11 RX ORDER — PANTOPRAZOLE SODIUM 40 MG/1
40 TABLET, DELAYED RELEASE ORAL
Status: DISCONTINUED | OUTPATIENT
Start: 2025-06-12 | End: 2025-06-24 | Stop reason: HOSPADM

## 2025-06-11 RX ORDER — TAMSULOSIN HYDROCHLORIDE 0.4 MG/1
0.4 CAPSULE ORAL DAILY
Status: DISCONTINUED | OUTPATIENT
Start: 2025-06-11 | End: 2025-06-22

## 2025-06-11 RX ORDER — GUAIFENESIN 200 MG/10ML
LIQUID ORAL 2 TIMES DAILY
COMMUNITY

## 2025-06-11 RX ORDER — MAGNESIUM HYDROXIDE/ALUMINUM HYDROXICE/SIMETHICONE 120; 1200; 1200 MG/30ML; MG/30ML; MG/30ML
30 SUSPENSION ORAL EVERY 6 HOURS PRN
Status: DISCONTINUED | OUTPATIENT
Start: 2025-06-11 | End: 2025-06-24 | Stop reason: HOSPADM

## 2025-06-11 RX ORDER — LISINOPRIL 10 MG/1
10 TABLET ORAL DAILY
Status: DISCONTINUED | OUTPATIENT
Start: 2025-06-11 | End: 2025-06-24 | Stop reason: HOSPADM

## 2025-06-11 RX ORDER — OLANZAPINE 5 MG/1
5 TABLET, FILM COATED ORAL EVERY 4 HOURS PRN
Status: DISCONTINUED | OUTPATIENT
Start: 2025-06-11 | End: 2025-06-12

## 2025-06-11 RX ORDER — LIDOCAINE 4 G/G
1 PATCH TOPICAL DAILY
Status: DISCONTINUED | OUTPATIENT
Start: 2025-06-12 | End: 2025-06-24 | Stop reason: HOSPADM

## 2025-06-11 RX ORDER — NICOTINE 21 MG/24HR
1 PATCH, TRANSDERMAL 24 HOURS TRANSDERMAL DAILY
Status: DISCONTINUED | OUTPATIENT
Start: 2025-06-11 | End: 2025-06-21

## 2025-06-11 RX ORDER — POLYETHYLENE GLYCOL 3350 17 G/17G
17 POWDER, FOR SOLUTION ORAL DAILY
Status: DISCONTINUED | OUTPATIENT
Start: 2025-06-11 | End: 2025-06-24 | Stop reason: HOSPADM

## 2025-06-11 RX ORDER — DIVALPROEX SODIUM 125 MG/1
125 CAPSULE, COATED PELLETS ORAL 2 TIMES DAILY
Status: DISCONTINUED | OUTPATIENT
Start: 2025-06-11 | End: 2025-06-13

## 2025-06-11 RX ORDER — POLYETHYLENE GLYCOL 3350 17 G
2 POWDER IN PACKET (EA) ORAL
Status: DISCONTINUED | OUTPATIENT
Start: 2025-06-11 | End: 2025-06-24 | Stop reason: HOSPADM

## 2025-06-11 RX ORDER — HYDROXYZINE HYDROCHLORIDE 50 MG/1
50 TABLET, FILM COATED ORAL 3 TIMES DAILY PRN
Status: DISCONTINUED | OUTPATIENT
Start: 2025-06-11 | End: 2025-06-24 | Stop reason: HOSPADM

## 2025-06-11 RX ORDER — POLYETHYLENE GLYCOL 3350 17 G/17G
17 POWDER, FOR SOLUTION ORAL DAILY PRN
Status: DISCONTINUED | OUTPATIENT
Start: 2025-06-11 | End: 2025-06-11

## 2025-06-11 RX ORDER — CETIRIZINE HYDROCHLORIDE 10 MG/1
10 TABLET ORAL DAILY
Status: DISCONTINUED | OUTPATIENT
Start: 2025-06-11 | End: 2025-06-12

## 2025-06-11 RX ORDER — CELECOXIB 200 MG/1
200 CAPSULE ORAL DAILY
Status: DISCONTINUED | OUTPATIENT
Start: 2025-06-11 | End: 2025-06-24 | Stop reason: HOSPADM

## 2025-06-11 RX ORDER — QUETIAPINE FUMARATE 25 MG/1
25 TABLET, FILM COATED ORAL 2 TIMES DAILY
Status: DISCONTINUED | OUTPATIENT
Start: 2025-06-11 | End: 2025-06-12

## 2025-06-11 RX ORDER — FUROSEMIDE 40 MG/1
40 TABLET ORAL 2 TIMES DAILY
Status: DISCONTINUED | OUTPATIENT
Start: 2025-06-11 | End: 2025-06-12 | Stop reason: SDUPTHER

## 2025-06-11 RX ORDER — GABAPENTIN 100 MG/1
100 CAPSULE ORAL 2 TIMES DAILY
Status: DISCONTINUED | OUTPATIENT
Start: 2025-06-11 | End: 2025-06-16

## 2025-06-11 RX ORDER — MENTHOL AND ZINC OXIDE .44; 20.625 G/100G; G/100G
OINTMENT TOPICAL EVERY 12 HOURS
Status: ON HOLD | COMMUNITY
End: 2025-06-23 | Stop reason: HOSPADM

## 2025-06-11 RX ORDER — GUAIFENESIN 200 MG/10ML
LIQUID ORAL 2 TIMES DAILY
Status: DISCONTINUED | OUTPATIENT
Start: 2025-06-11 | End: 2025-06-12 | Stop reason: CLARIF

## 2025-06-11 RX ORDER — ONDANSETRON 4 MG/1
8 TABLET, ORALLY DISINTEGRATING ORAL EVERY 8 HOURS PRN
Status: DISCONTINUED | OUTPATIENT
Start: 2025-06-11 | End: 2025-06-24 | Stop reason: HOSPADM

## 2025-06-11 RX ORDER — ACETAMINOPHEN 325 MG/1
650 TABLET ORAL EVERY 4 HOURS PRN
Status: DISCONTINUED | OUTPATIENT
Start: 2025-06-11 | End: 2025-06-24 | Stop reason: HOSPADM

## 2025-06-11 RX ORDER — OXYCODONE AND ACETAMINOPHEN 5; 325 MG/1; MG/1
1 TABLET ORAL EVERY 4 HOURS PRN
Refills: 0 | Status: DISCONTINUED | OUTPATIENT
Start: 2025-06-11 | End: 2025-06-11

## 2025-06-11 RX ORDER — SUMATRIPTAN 6 MG/.5ML
6 INJECTION, SOLUTION SUBCUTANEOUS
Status: DISCONTINUED | OUTPATIENT
Start: 2025-06-11 | End: 2025-06-24 | Stop reason: HOSPADM

## 2025-06-11 RX ORDER — TRAZODONE HYDROCHLORIDE 50 MG/1
50 TABLET ORAL NIGHTLY PRN
Status: DISCONTINUED | OUTPATIENT
Start: 2025-06-11 | End: 2025-06-24 | Stop reason: HOSPADM

## 2025-06-11 RX ORDER — HYDROCODONE BITARTRATE AND ACETAMINOPHEN 5; 325 MG/1; MG/1
1 TABLET ORAL EVERY 8 HOURS PRN
Refills: 0 | Status: DISCONTINUED | OUTPATIENT
Start: 2025-06-11 | End: 2025-06-24 | Stop reason: HOSPADM

## 2025-06-11 RX ORDER — LOPERAMIDE HYDROCHLORIDE 2 MG/1
2 CAPSULE ORAL 4 TIMES DAILY PRN
Status: DISCONTINUED | OUTPATIENT
Start: 2025-06-11 | End: 2025-06-24 | Stop reason: HOSPADM

## 2025-06-11 RX ORDER — MULTIVITAMIN WITH IRON
1000 TABLET ORAL DAILY
Status: DISCONTINUED | OUTPATIENT
Start: 2025-06-11 | End: 2025-06-24 | Stop reason: HOSPADM

## 2025-06-11 RX ORDER — NYSTATIN 100000 [USP'U]/G
POWDER TOPICAL EVERY 12 HOURS
Status: ON HOLD | COMMUNITY
End: 2025-06-23

## 2025-06-11 RX ORDER — DIVALPROEX SODIUM 125 MG/1
125 CAPSULE, COATED PELLETS ORAL 2 TIMES DAILY
Status: ON HOLD | COMMUNITY
End: 2025-06-23 | Stop reason: HOSPADM

## 2025-06-11 RX ORDER — BENZTROPINE MESYLATE 1 MG/ML
2 INJECTION, SOLUTION INTRAMUSCULAR; INTRAVENOUS 2 TIMES DAILY PRN
Status: DISCONTINUED | OUTPATIENT
Start: 2025-06-11 | End: 2025-06-24 | Stop reason: HOSPADM

## 2025-06-11 RX ADMIN — WATER 5 MG: 1 INJECTION INTRAMUSCULAR; INTRAVENOUS; SUBCUTANEOUS at 20:35

## 2025-06-11 ASSESSMENT — LIFESTYLE VARIABLES
HOW OFTEN DO YOU HAVE A DRINK CONTAINING ALCOHOL: PATIENT UNABLE TO ANSWER
HOW OFTEN DO YOU HAVE A DRINK CONTAINING ALCOHOL: PATIENT UNABLE TO ANSWER
HOW MANY STANDARD DRINKS CONTAINING ALCOHOL DO YOU HAVE ON A TYPICAL DAY: PATIENT UNABLE TO ANSWER
HOW MANY STANDARD DRINKS CONTAINING ALCOHOL DO YOU HAVE ON A TYPICAL DAY: PATIENT UNABLE TO ANSWER

## 2025-06-11 NOTE — BH NOTE
CSSR-S Assessment completed with patient who then scored SPRING patient not aswering questions, flipping staff off.     Provider Dr Alvarez was notified of nonverbal score, via In-person at 11:15 am.      Were suicide precautions ordered: NO    If not ordered, justification as follows: Patient with dementia, non verbal @ this time, giving staff the finger.    Completed by: Latricia Bhagat RN

## 2025-06-11 NOTE — BH NOTE
4 Eyes Skin Assessment     NAME:  Arsalan Silverio  YOB: 1950  MEDICAL RECORD NUMBER:  [id number]    The patient is being assessed for  Admission    I agree that at least one RN has performed a thorough Head to Toe Skin Assessment on the patient. ALL assessment sites listed below have been assessed.      Areas assessed by both nurses:    Head, Face, Ears, Shoulders, Back, Chest, Arms, Elbows, Hands, Sacrum. Buttock, Coccyx, Ischium, Legs. Feet and Heels, and Under Medical Devices  Has redness to buttocks,scrotum, no open areas. Has a scabbed area to 2nd toe on right foot, also has a dark area between last 2 toes on right foot, area Is not open. Edema to left lower extremity. Has areas on bilateral knees due to picking and scratching, areas are superficial with slight bleeding. Various areas and scratches all over due to picking, scratching and pinching.        Does the Patient have a Wound? No noted wound(s)        Wei Prevention initiated by RN: No  Wound Care Orders initiated by RN: No    Pressure Injury (Stage 3,4, Unstageable, DTI, NWPT, and Complex wounds) if present, place Wound referral order by RN under : No    New Ostomies, if present place, Ostomy referral order under : No     Nurse 1 eSignature: Electronically signed by Danielle López RN on 6/11/25 at 5:56 PM EDT    **SHARE this note so that the co-signing nurse can place an eSignature**    Nurse 2 eSignature: Electronically signed by Latricia Bhagat RN on 6/11/25 at 6:42 PM EDT

## 2025-06-11 NOTE — BH NOTE
Pt arrived on unit via stretcher with security and another staff member. Was cleared via wand. Patient is in a gown. Immediately patient was grabbing, kicking, slapping at staff. He also flipped staff off and mouthed the words \"fuck you\". Is non-verbal.

## 2025-06-11 NOTE — ED NOTES
Transfer Center Handoff for Behavioral Health Transfers      Patient's Current Location: [facility] EMERGENCY DEPARTMENT     Chief Complaint   Patient presents with    Aggressive Behavior     Pt from [facility]; as per staff pt was aggressive with them, spitting and kicking staff. Pt has been calm and cooperative with EMS and upon ER arrival.        Current or History of Violent Behavior: Yes    Currently in Restraints Now or During this Encounter: No  (Specify if Agitation or self harm is noted in ED?)  If yes, please describe behaviors requiring restraint:             Medical Clearance Documented and Verified in the Chart: Yes    Allergies   Allergen Reactions    Adhesive Tape Other (See Comments)     blisters    Oxycodone-Acetaminophen Itching    Percodan [Oxycodone-Aspirin] Itching        Can Patient Tolerate Lying Flat: Yes    Able to Perform ADLs:  No: dementia, parkinson's  (Specify if able to ambulate or uses any mobility devices such as cane or walker)  Activity:    Level of Assistance:    Assistive Device:    Miscellaneous Devices:      LABS    CBC:   Lab Results   Component Value Date/Time    WBC 8.7 06/11/2025 12:24 AM    RBC 4.34 06/11/2025 12:24 AM    HGB 13.7 06/11/2025 12:24 AM    HCT 40.5 06/11/2025 12:24 AM    MCV 93.4 06/11/2025 12:24 AM    MCH 31.6 06/11/2025 12:24 AM    MCHC 33.8 06/11/2025 12:24 AM    RDW 13.3 06/11/2025 12:24 AM     06/11/2025 12:24 AM    MPV 8.9 06/11/2025 12:24 AM     CMP:   Lab Results   Component Value Date/Time     06/11/2025 12:24 AM    K 4.0 06/11/2025 12:24 AM     06/11/2025 12:24 AM    CO2 22 06/11/2025 12:24 AM    BUN 25 06/11/2025 12:24 AM    CREATININE 0.8 06/11/2025 12:24 AM    GFRAA >60 10/02/2022 06:44 AM    AGRATIO 1.5 06/11/2025 12:24 AM    LABGLOM >90 06/11/2025 12:24 AM    GLUCOSE 87 06/11/2025 12:24 AM    CALCIUM 8.9 06/11/2025 12:24 AM    BILITOT <0.2 06/11/2025 12:24 AM    ALKPHOS 84 06/11/2025 12:24 AM    AST 19 06/11/2025 12:24

## 2025-06-11 NOTE — ED NOTES
Pt aggressive w/ RN while attempting to place on lift sheet to take patient to CT. Pt attempted to bite and hit RN.

## 2025-06-11 NOTE — ED PROVIDER NOTES
I assumed care of this patient at shift change.  Please refer to Dr. Diaz's note for full H&P.  The patient was seen last night for agitation from his nursing home.  Refusing to take medications, aggressive with staff.  Was medically cleared and evaluated last night and admitted by psychiatry.  Before the patient could be moved to his room this morning they were requesting he be placed on involuntary hold.  I went in to speak with the patient.  He will not talk to me and is agitated, aggressive.  I have completed the hold paperwork.    Results for orders placed or performed during the hospital encounter of 06/10/25   CBC with Auto Differential   Result Value Ref Range    WBC 8.7 4.0 - 11.0 K/uL    RBC 4.34 4.20 - 5.90 M/uL    Hemoglobin 13.7 13.5 - 17.5 g/dL    Hematocrit 40.5 40.5 - 52.5 %    MCV 93.4 80.0 - 100.0 fL    MCH 31.6 26.0 - 34.0 pg    MCHC 33.8 31.0 - 36.0 g/dL    RDW 13.3 12.4 - 15.4 %    Platelets 162 135 - 450 K/uL    MPV 8.9 5.0 - 10.5 fL    Neutrophils % 64.9 %    Lymphocytes % 24.5 %    Monocytes % 8.0 %    Eosinophils % 2.1 %    Basophils % 0.5 %    Neutrophils Absolute 5.6 1.7 - 7.7 K/uL    Lymphocytes Absolute 2.1 1.0 - 5.1 K/uL    Monocytes Absolute 0.7 0.0 - 1.3 K/uL    Eosinophils Absolute 0.2 0.0 - 0.6 K/uL    Basophils Absolute 0.0 0.0 - 0.2 K/uL   Comprehensive Metabolic Panel w/ Reflex to MG   Result Value Ref Range    Sodium 135 (L) 136 - 145 mmol/L    Potassium reflex Magnesium 4.0 3.5 - 5.1 mmol/L    Chloride 103 99 - 110 mmol/L    CO2 22 21 - 32 mmol/L    Anion Gap 10 3 - 16    Glucose 87 70 - 99 mg/dL    BUN 25 (H) 7 - 20 mg/dL    Creatinine 0.8 0.8 - 1.3 mg/dL    Est, Glom Filt Rate >90 >60    Calcium 8.9 8.3 - 10.6 mg/dL    Total Protein 5.4 (L) 6.4 - 8.2 g/dL    Albumin 3.2 (L) 3.4 - 5.0 g/dL    Albumin/Globulin Ratio 1.5 1.1 - 2.2    Total Bilirubin <0.2 0.0 - 1.0 mg/dL    Alkaline Phosphatase 84 40 - 129 U/L    ALT 7 (L) 10 - 40 U/L    AST 19 15 - 37 U/L   Ethanol   Result

## 2025-06-11 NOTE — ED PROVIDER NOTES
Emergency Department Encounter    Patient: Arsalan Silverio  MRN: [id number]  : 1950  Date of Evaluation: 2025  ED Provider:  Robert Talley MD    Triage Chief Complaint:   Aggressive Behavior (Pt from [facility]; as per staff pt was aggressive with them, spitting and kicking staff. Pt has been calm and cooperative with EMS and upon ER arrival. )    [place]:  Arsalan Silverio is a 75 y.o. male with history seen below as well as history of Parkinson's disease presenting with aggressive behavior at nursing facility.  He states the patient was recently admitted psychiatrically for aggressive behavior and similar symptoms.  States patient has been back at their facility for 1 to 2 weeks but tonight he became increasingly aggressive and was spitting and hitting so patient was sent into the emergency department.  Nursing facility states that it is not uncommon for patient to have aggressive behaviors but seems to be worsening.  States patient is very slow to speak and often does not answer questions and prefers to use his hands to communicate.  Denies fevers, recent falls or trauma.  States otherwise patient has been at baseline.  On my evaluation patient states he is not having any pain denies chest pain, shortness of breath, abdominal pain or.TEMPHEAD      ROS - see HPI, below listed is current ROS at time of my eval:  Review of systems is limited given patient only intermittently answering questions.    Past Medical History:   Diagnosis Date    Acid reflux     BP (high blood pressure)     Chronic back pain     fell [place] a fishing dock     Depression     Hyperlipidemia     Neuropathy     Sleep apnea     uses CPAP     Past Surgical History:   Procedure Laterality Date    ANKLE FRACTURE SURGERY Right     KNEE SURGERY Bilateral     [place] NEUROPLASTY &/TRANSPOS MEDIAN NRV CARPAL TUNNE Right 2018    RIGHT CARPAL TUNNEL RELEASE performed by Palomo Pollack MD at [facility] OR

## 2025-06-11 NOTE — VIRTUAL HEALTH
Arsalan Silverio  [id number]  1950     Social Work Behavioral Health Crisis Assessment    06/11/25    Chief Complaint: Aggressive Behavior    HPI: Patient is a 75 y.o. White (non-) male who presents for aggressive behavior. Per triage note, Aggressive Behavior (Pt from [facility]; as per staff pt was aggressive with them, spitting and kicking staff. Pt has been calm and cooperative with EMS and upon ER arrival.     LISW met with patient via [employer]. Pt's ED nurse was at bedside to assist with evaluation. Patient would not open his eyes or participate in evaluation. He would make a moaning noise when LISW spoke or asked a question. Per pt's nurse, patient is alert/oriented to self only and has been aggressive with staff in the ED. LISW called [facility] [place] Line ([phone]) and spoke with nurse Serenity. Serenity reported that patient was sent to the ED due to aggressive behavior towards staff and other residents. She reported that he was kicking, biting and barricading himself and others in a room. She reported that patient was not redirectable. Serenity stated that patient has a hx of aggressive behavior but he is normally redirectable. She reported that patient has a hx of Dementia, Psychotic Disorder with hallucinations, Depression and Parkinson's Disease. Serenity reported that patient was recently admitted to [facility] and was discharged on 6/7. She reported that they made several medication changes and patient is currently prescribed Seroquel 25mg and Depakote 250mg, however patient does not consistently take the medication. Serenity reported that patient receives psychiatric services through [id number]. She reported that the Physician at the nursing facility said that they cannot handle patient's behaviors and is requesting that patient is admitted for further evaluation and medication changes.     Collateral: see above.     Past Psychiatric

## 2025-06-11 NOTE — GROUP NOTE
Group Therapy Note    Date: 6/11/2025    Group Start Time: 1100  Group End Time: 1145  Group Topic: Cognitive Skills    Elana Lynch        Group Therapy Note    Attendees: 4    Patient was invited to group but unable to attend.       Discipline Responsible: Behavorial Health Tech      Signature:  BRYAN SOTELO

## 2025-06-11 NOTE — BH NOTE
Home Medication Reconciliation Status          [] COMPLETE       Medication history has been reviewed and obtained from the following source(s):       [] patient/family verbal report             [] patient/family provided written list       [] external pharmacy   [] external facility list         []  Provider notified that home medication reconciliation is complete          [x] IN PROGRESS       Medication reconciliation marked in progress at this time due to:       [] patient/family poor historian      [] waiting arrival of family to clarify       [] waiting for accurate list        [] external pharmacy needs called     Partially completed, is in progress. Some medications have already been ordered.          [] UNABLE TO ASSESS       Medication reconciliation is incomplete and unable to assess at this time due to:       [] critical patient condition   [] patient is unresponsive        [] no family available                       [] unknown pharmacy       [] anonymous patient          * Follow up is needed.      [] Pharmacy consult placed for medication reconciliation assistance   Additional comments:

## 2025-06-11 NOTE — ED NOTES
Patient had taken off his attends. Patient had voided in bed. Linens changed. New attends applied. Gown changed. Warm blanket provided.

## 2025-06-11 NOTE — GROUP NOTE
Group Therapy Note    Date: 6/11/2025    Group Start Time: 1330  Group End Time: 1430  Group Topic: Cognitive Skills    Elana Lynch        Group Therapy Note    Attendees: 3    Patient was invited to group but unable to attend.       Discipline Responsible: Behavorial Health Tech      Signature:  BRYAN SOTELO

## 2025-06-11 NOTE — BH NOTE
Arsalan pinched his right knee enough to break the skin and get a few drops of blood.  Arsalan attempted to wipe the blood on this nurse and when this nurse verbally corrected Arsalan, he was observed to be laughing.  Arsalan's hands and knee were cleaned and Yue has calmed down now and is sitting in the wheelchair, resting.

## 2025-06-11 NOTE — BH NOTE
This nurse is currently sitting with Arsalan and is attempting to file admission information.    Arsalan is currently awake and sitting in a wheelchair in his room.  He refuses to wear a gown.  He is wearing a brief.    Arsalan displays significant tremors continuously, hands and feet.  Arsalan has not spoken since this nurse arrived an hour ago, but he is able to nod in agreement asked a question.  For example, this nurse asked Arsalan what branch of the service he was in, Arsalan did not answer.  This nurse then asked Arsalan if he was in the [employer] and he shook his head no.  Arsalan then was asked if he was a [employer] and he shook his head yes.  This nurse read reports from the nursing home that Arsalan talks to his wife on the phone and can dial his own phone calls.

## 2025-06-12 LAB
CHOLEST SERPL-MCNC: 149 MG/DL (ref 0–199)
EST. AVERAGE GLUCOSE BLD GHB EST-MCNC: 102.5 MG/DL
HBA1C MFR BLD: 5.2 %
HDLC SERPL-MCNC: 33 MG/DL (ref 40–60)
LDLC SERPL CALC-MCNC: 98 MG/DL
TRIGL SERPL-MCNC: 90 MG/DL (ref 0–150)
VLDLC SERPL CALC-MCNC: 18 MG/DL

## 2025-06-12 PROCEDURE — 6370000000 HC RX 637 (ALT 250 FOR IP)

## 2025-06-12 PROCEDURE — 90792 PSYCH DIAG EVAL W/MED SRVCS: CPT | Performed by: PSYCHIATRY & NEUROLOGY

## 2025-06-12 PROCEDURE — 97530 THERAPEUTIC ACTIVITIES: CPT

## 2025-06-12 PROCEDURE — 97116 GAIT TRAINING THERAPY: CPT

## 2025-06-12 PROCEDURE — 6370000000 HC RX 637 (ALT 250 FOR IP): Performed by: PSYCHIATRY & NEUROLOGY

## 2025-06-12 PROCEDURE — 6360000002 HC RX W HCPCS: Performed by: PSYCHIATRY & NEUROLOGY

## 2025-06-12 PROCEDURE — 97166 OT EVAL MOD COMPLEX 45 MIN: CPT

## 2025-06-12 PROCEDURE — 93005 ELECTROCARDIOGRAM TRACING: CPT | Performed by: PSYCHIATRY & NEUROLOGY

## 2025-06-12 PROCEDURE — 2500000003 HC RX 250 WO HCPCS: Performed by: PSYCHIATRY & NEUROLOGY

## 2025-06-12 PROCEDURE — 1240000000 HC EMOTIONAL WELLNESS R&B

## 2025-06-12 PROCEDURE — 97162 PT EVAL MOD COMPLEX 30 MIN: CPT

## 2025-06-12 RX ORDER — OLANZAPINE 5 MG/1
5 TABLET, FILM COATED ORAL 2 TIMES DAILY
Status: DISCONTINUED | OUTPATIENT
Start: 2025-06-12 | End: 2025-06-12

## 2025-06-12 RX ORDER — MINERAL OIL/HYDROPHIL PETROLAT
OINTMENT (GRAM) TOPICAL 2 TIMES DAILY
Status: DISCONTINUED | OUTPATIENT
Start: 2025-06-12 | End: 2025-06-24 | Stop reason: HOSPADM

## 2025-06-12 RX ORDER — FUROSEMIDE 40 MG/1
40 TABLET ORAL 2 TIMES DAILY
Status: DISCONTINUED | OUTPATIENT
Start: 2025-06-12 | End: 2025-06-22

## 2025-06-12 RX ADMIN — HYDROXYZINE HYDROCHLORIDE 50 MG: 50 TABLET ORAL at 21:14

## 2025-06-12 RX ADMIN — MICONAZOLE NITRATE: 20 POWDER TOPICAL at 21:16

## 2025-06-12 RX ADMIN — Medication 1000 MCG: at 14:07

## 2025-06-12 RX ADMIN — DIVALPROEX SODIUM 125 MG: 125 CAPSULE, COATED PELLETS ORAL at 21:15

## 2025-06-12 RX ADMIN — LISINOPRIL 10 MG: 10 TABLET ORAL at 14:08

## 2025-06-12 RX ADMIN — TAMSULOSIN HYDROCHLORIDE 0.4 MG: 0.4 CAPSULE ORAL at 14:07

## 2025-06-12 RX ADMIN — PANTOPRAZOLE SODIUM 40 MG: 40 TABLET, DELAYED RELEASE ORAL at 14:08

## 2025-06-12 RX ADMIN — CETIRIZINE HYDROCHLORIDE 10 MG: 10 TABLET ORAL at 14:07

## 2025-06-12 RX ADMIN — CELECOXIB 200 MG: 200 CAPSULE ORAL at 14:07

## 2025-06-12 RX ADMIN — QUETIAPINE FUMARATE 25 MG: 25 TABLET ORAL at 14:07

## 2025-06-12 RX ADMIN — FUROSEMIDE 40 MG: 40 TABLET ORAL at 17:37

## 2025-06-12 RX ADMIN — LEVODOPA AND CARBIDOPA 1 CAPSULE: 245; 61.25 CAPSULE, EXTENDED RELEASE ORAL at 21:15

## 2025-06-12 RX ADMIN — GABAPENTIN 100 MG: 100 CAPSULE ORAL at 21:15

## 2025-06-12 RX ADMIN — LEVODOPA AND CARBIDOPA 1 CAPSULE: 245; 61.25 CAPSULE, EXTENDED RELEASE ORAL at 14:17

## 2025-06-12 RX ADMIN — TRAZODONE HYDROCHLORIDE 50 MG: 50 TABLET ORAL at 21:14

## 2025-06-12 RX ADMIN — WHITE PETROLATUM: 1.75 OINTMENT TOPICAL at 21:16

## 2025-06-12 RX ADMIN — IBUPROFEN 400 MG: 400 TABLET, FILM COATED ORAL at 21:14

## 2025-06-12 RX ADMIN — ANORECTAL OINTMENT: 15.7; .44; 24; 20.6 OINTMENT TOPICAL at 21:15

## 2025-06-12 RX ADMIN — WATER 5 MG: 1 INJECTION INTRAMUSCULAR; INTRAVENOUS; SUBCUTANEOUS at 08:25

## 2025-06-12 ASSESSMENT — SLEEP AND FATIGUE QUESTIONNAIRES
DO YOU USE A SLEEP AID: NO
DO YOU HAVE DIFFICULTY SLEEPING: NO

## 2025-06-12 ASSESSMENT — PAIN - FUNCTIONAL ASSESSMENT: PAIN_FUNCTIONAL_ASSESSMENT: ACTIVITIES ARE NOT PREVENTED

## 2025-06-12 ASSESSMENT — PAIN DESCRIPTION - LOCATION: LOCATION: BACK

## 2025-06-12 ASSESSMENT — PAIN SCALES - GENERAL
PAINLEVEL_OUTOF10: 0
PAINLEVEL_OUTOF10: 6

## 2025-06-12 ASSESSMENT — PAIN DESCRIPTION - DESCRIPTORS: DESCRIPTORS: ACHING

## 2025-06-12 NOTE — H&P
ketorolac (TORADOL) 10 MG tablet Take 1 tablet by mouth every 6 hours as needed for Pain 3/10/25 3/10/26  Wagner Chatman MD   ondansetron (ZOFRAN) 4 MG tablet Take 2 tablets by mouth every 8 hours as needed for Nausea or Vomiting 3/10/25   Wagner Chatman MD   FLUoxetine (PROZAC) 20 MG capsule Take 2 capsules by mouth daily    Per Zavala MD   pimavanserin tartrate (NUPLAZID) 34 MG CAPS capsule Take 1 capsule by mouth daily    Per Zavala MD   diphenhydrAMINE (BENADRYL) 50 MG capsule Take 1 capsule by mouth every 6 hours as needed for Itching    Per Zavala MD   SUMAtriptan (IMITREX) 6 MG/0.5ML SOLN injection Inject 0.5 mLs into the skin once as needed for Migraine    Per Zavala MD   furosemide (LASIX) 40 MG tablet Take 1 tablet by mouth in the morning and 1 tablet in the evening.    Per Zavala MD   loperamide (IMODIUM) 2 MG capsule Take 1 capsule by mouth 4 times daily as needed for Diarrhea    Per Zavala MD   magnesium hydroxide (MILK OF MAGNESIA) 400 MG/5ML suspension Take by mouth daily as needed for Constipation    Per Zavala MD   oxyCODONE 5 MG capsule Take 1 capsule by mouth every 4 hours as needed for Pain.    Per Zavala MD   oxyCODONE-acetaminophen (PERCOCET) 5-325 MG per tablet Take 1 tablet by mouth every 4 hours as needed for Pain. Max Daily Amount: 6 tablets    Per Zavala MD   carbidopa-levodopa (RYTARY) 61. MG CPCR per extended release capsule Take 1 capsule by mouth in the morning, at noon, and at bedtime 2/27/25   Adalgisa Eason MD   onabotulinumtoxinA (BOTOX) 100 units injection Inject 100 Units into the muscle every 3 months 12/3/24   Adalgisa Eason MD   vitamin B-12 (CYANOCOBALAMIN) 1000 MCG tablet Take 1 tablet by mouth daily    Per Zavala MD   lidocaine (LIDODERM) 5 % Place 1 patch onto the skin daily 12 hours on, 12 hours off.    Per Zavala MD 
Aggressive behavior [R46.89] 06/11/2025     Tx plan:    prevent self injury, stabilize affect, restore sleep, treat depression, treat anxiety, establish/maintain aftercare, increase coping mechanisms, improve medication compliance.  All conditions present on admission are being treated while pt is hospitalized.   Discussed PHP after discharge as part of transition back to the community.     Medications  Current Facility-Administered Medications   Medication Dose Route Frequency Provider Last Rate Last Admin    mineral oil-hydrophilic petrolatum (AQUAPHOR) ointment   Topical BID Saskia Pollack APRN - CNP        furosemide (LASIX) tablet 40 mg  40 mg Oral BID Saskia Pollack APRN - CNP        carbidopa-levodopa (RYTARY) 61. MG per extended release capsule 1 capsule  1 capsule Oral TID Temo Alvarez MD        acetaminophen (TYLENOL) tablet 650 mg  650 mg Oral Q4H PRN Temo Alvarez MD        ibuprofen (ADVIL;MOTRIN) tablet 400 mg  400 mg Oral Q6H PRN Temo Alvarez MD        magnesium hydroxide (MILK OF MAGNESIA) 400 MG/5ML suspension 30 mL  30 mL Oral Daily PRN Temo Alvarez MD        nicotine (NICODERM CQ) 21 MG/24HR 1 patch  1 patch TransDERmal Daily Temo Alvarez MD        nicotine polacrilex (COMMIT) lozenge 2 mg  2 mg Oral Q1H PRN Temo Alvarez MD        aluminum & magnesium hydroxide-simethicone (MAALOX PLUS) 200-200-20 MG/5ML suspension 30 mL  30 mL Oral Q6H PRN Temo Alvarez MD        hydrOXYzine HCl (ATARAX) tablet 50 mg  50 mg Oral TID PRN Temo Alvarez MD        OLANZapine (ZYPREXA) tablet 5 mg  5 mg Oral Q4H PRN Temo Alvarez MD        Or    OLANZapine (ZyPREXA) 5 mg in sterile water 1 mL injection  5 mg IntraMUSCular Q4H PRN Temo Alvarez MD   5 mg at 06/12/25 0825    benztropine mesylate (COGENTIN) injection 2 mg  2 mg IntraMUSCular BID PRN Temo Alvarez MD        traZODone (DESYREL) tablet 50 mg  50 mg Oral Nightly PRN Temo Alvarez

## 2025-06-12 NOTE — BH NOTE
Arsalan is in the day room working with OT/PT at this time.  He is currently waking with walker and gait belt with therapy.

## 2025-06-12 NOTE — GROUP NOTE
Group Therapy Note    Date: 6/12/2025    Group Start Time: 1330  Group End Time: 1430  Group Topic: Activity    [facility] Mariela Jenkins LSW        Group Therapy Note  Patient was invited to group but unable to attend.     Attendees: 5     Signature:  ENE Knowles

## 2025-06-12 NOTE — BH NOTE
Restraint/Seclusion Debrief    Patient did participate in the post restraint debrief. Patient's family  did not participate in the post restraint debrief.  Discussed the following precipitating factors that led to the restraint:   1. Attempting to bite   2. Attempting to hit   3. Working with staff to get needs met  Patient unable to discuss, just listened, could have been done differently to de-escilate the situation and staff identify that nothing could have been done differently. The physical and psychological wellbeing of the patient was assessed and the patient appears to be continuing to relax.  .  Will continue to monitor for any signs of trauma.     This restraint was a physical hold for medication administration and only lasted 30-60 seconds.    5

## 2025-06-12 NOTE — BH NOTE
Arsalan was laying in bed when he began urinating between the bed and the wall.  When finished, this nurse assisted Arsalan to get up and get a pull up on, and sit in a wheel chair.  After sitting in the chair, Arsalan agreed to have a gown on.  He then wanted to come out to the day room.    Arsalan ate lunch and ate 100% of his meal.

## 2025-06-12 NOTE — CARE COORDINATION
Collateral Contact:  Name: Gabi Silverio  Phone: [phone]  Relation to Patient: wife    Concerns:       Evan -     been at [facility] for 1 yr.   Has had minor issues since admissions but always been able to redirect and figure out what was wrong.       Parkinson's with dementia -     Started having problems a couple weeks prior to [facility], was there about 3 weeks, back at [place] for about 1 1/2 weeks, then sent      mostly non-verbal at baseline    Refusing meds, kicking, biting, hitting,       he has had UTIs in the past and would be more irritable and \"sleeping around the clock\".     Can feed himself, does ok with utensils, a good eater, will eat just about anything, loves vegetables, love fruit.         Pepsi - is his favorite to drink         some spots on his brain from old injuries, but she does not know details - played football, so possible injuries from that, trauma was leaving home to move to the nursing home.      Has always been an asshole, it's always been his way or no way, He is much worse now, if you try to force the issue, he goes off.   sleep - seemed to have his nights and days mixed up lately. seemed really tired during the day lately.      he is not incontinent, he uses a female urinal     his skin gets irritated very easy - under his arms and in groin area        He taught self-defence    watching sports on TV - Any    Writes - dry erase board helps him communicate, Is left handed

## 2025-06-12 NOTE — BH NOTE
Arsalan is in bed and was incontinent of urine.  Arsalan allowed this nurse to wash him but did not cooperate with receiving a new pull up.  After being cleaned, Arsalan is laying calmly in the bed.    Yes

## 2025-06-12 NOTE — BH NOTE
Arsalan pressed the call light.  Geoter turned the call light off and Arsalan pressed it again.  This occurred several times in the view of this nurse.  Arsalan then turned on the call light and placed his hand over the button to turn off the call light.  Arsalan was verbally encouraged to press the button to turn the call light off but he refused.      Staff decided to turn Arsalan around in the bed so his head was close to the entry door in order that he would have to sit up in order to activate the call light.

## 2025-06-12 NOTE — BH NOTE
Attempted to get vital signs but patient refused.  Pt is in the process of calming and we agreed to get vital signs later in the shift.

## 2025-06-12 NOTE — BH NOTE
Arsalan was attempting to roll out of the bed.  Wilber called this nurse as he was standing in beside the bed preventing Arsalan from rolling out of bed.  This nurse placed a wedge under the mattress on the side of the bed and Arsalan stopped attempting to roll out.

## 2025-06-13 LAB
EKG ATRIAL RATE: 63 BPM
EKG DIAGNOSIS: NORMAL
EKG P AXIS: 68 DEGREES
EKG P-R INTERVAL: 154 MS
EKG Q-T INTERVAL: 446 MS
EKG QRS DURATION: 112 MS
EKG QTC CALCULATION (BAZETT): 456 MS
EKG R AXIS: 48 DEGREES
EKG T AXIS: 60 DEGREES
EKG VENTRICULAR RATE: 63 BPM

## 2025-06-13 PROCEDURE — 1240000000 HC EMOTIONAL WELLNESS R&B

## 2025-06-13 PROCEDURE — 6360000002 HC RX W HCPCS: Performed by: PSYCHIATRY & NEUROLOGY

## 2025-06-13 PROCEDURE — 6370000000 HC RX 637 (ALT 250 FOR IP): Performed by: PSYCHIATRY & NEUROLOGY

## 2025-06-13 PROCEDURE — 99233 SBSQ HOSP IP/OBS HIGH 50: CPT | Performed by: PSYCHIATRY & NEUROLOGY

## 2025-06-13 PROCEDURE — 93010 ELECTROCARDIOGRAM REPORT: CPT | Performed by: INTERNAL MEDICINE

## 2025-06-13 PROCEDURE — 6370000000 HC RX 637 (ALT 250 FOR IP)

## 2025-06-13 RX ORDER — OLANZAPINE 10 MG/2ML
5 INJECTION, POWDER, FOR SOLUTION INTRAMUSCULAR EVERY 6 HOURS PRN
Status: DISCONTINUED | OUTPATIENT
Start: 2025-06-13 | End: 2025-06-18

## 2025-06-13 RX ORDER — OLANZAPINE 5 MG/1
5 TABLET, FILM COATED ORAL EVERY 6 HOURS PRN
Status: DISCONTINUED | OUTPATIENT
Start: 2025-06-13 | End: 2025-06-18

## 2025-06-13 RX ORDER — OLANZAPINE 10 MG/2ML
10 INJECTION, POWDER, FOR SOLUTION INTRAMUSCULAR ONCE
Status: COMPLETED | OUTPATIENT
Start: 2025-06-13 | End: 2025-06-13

## 2025-06-13 RX ORDER — DIVALPROEX SODIUM 125 MG/1
250 CAPSULE, COATED PELLETS ORAL 2 TIMES DAILY
Status: DISCONTINUED | OUTPATIENT
Start: 2025-06-13 | End: 2025-06-16

## 2025-06-13 RX ORDER — OLANZAPINE 10 MG/2ML
5 INJECTION, POWDER, FOR SOLUTION INTRAMUSCULAR ONCE
Status: DISCONTINUED | OUTPATIENT
Start: 2025-06-13 | End: 2025-06-13

## 2025-06-13 RX ORDER — RIVASTIGMINE TARTRATE 1.5 MG/1
1.5 CAPSULE ORAL 2 TIMES DAILY
Status: DISCONTINUED | OUTPATIENT
Start: 2025-06-13 | End: 2025-06-16

## 2025-06-13 RX ADMIN — SUMATRIPTAN SUCCINATE 6 MG: 6 INJECTION SUBCUTANEOUS at 18:08

## 2025-06-13 RX ADMIN — LEVODOPA AND CARBIDOPA 1 CAPSULE: 245; 61.25 CAPSULE, EXTENDED RELEASE ORAL at 15:56

## 2025-06-13 RX ADMIN — OLANZAPINE 10 MG: 10 INJECTION, POWDER, FOR SOLUTION INTRAMUSCULAR at 15:59

## 2025-06-13 RX ADMIN — OLANZAPINE 10 MG: 10 INJECTION, POWDER, FOR SOLUTION INTRAMUSCULAR at 21:00

## 2025-06-13 RX ADMIN — POLYETHYLENE GLYCOL (3350) 17 G: 17 POWDER, FOR SOLUTION ORAL at 15:34

## 2025-06-13 NOTE — BH NOTE
Restraint/Seclusion Debrief    Patient did not participate in the post restraint debrief. Patient's family  did not participate in the post restraint debrief. If not, why refused. Discussed the following precipitating factors that led to the restraint:   1. Pt refused medications    Staff identify that nothing could have been done differently. The physical and psychological wellbeing of the patient was assessed and the patient appears to be calm.  Will continue to monitor for any signs of trauma.

## 2025-06-13 NOTE — BH NOTE
.Restraint 1 Hour RN assessment      Arsalan Silverio was agitated AEB trying to hit and bite staff and uncooperative with care. Arsalan Silverio was placed in physical hold at 1600 due to Behavioral management problems and to administer IM injection. Currently patient is in w/c in day room. Patient medical history includes       Diagnosis Date    Acid reflux     BP (high blood pressure)     Chronic back pain 2016    fell [place] a fishing dock     Depression     Hyperlipidemia     Neuropathy     Sleep apnea     uses CPAP    with consideration given to chronic back pain in regards to restraint risk. Current mental status awake and alert; oriented to person, place, and time. At this time the patient  does not meet criteria for continued restraint AEB injection has been administered    The room has been assessed for safety and potentially harmful objects. Patient has been assessed for comfort and current needs.   Respirations even and unlabored. Skin WDL    Current vitals include Refused.    Most recent lab values include:   Admission on 06/10/2025   Component Date Value Ref Range Status    WBC 06/11/2025 8.7  4.0 - 11.0 K/uL Final    RBC 06/11/2025 4.34  4.20 - 5.90 M/uL Final    Hemoglobin 06/11/2025 13.7  13.5 - 17.5 g/dL Final    Hematocrit 06/11/2025 40.5  40.5 - 52.5 % Final    MCV 06/11/2025 93.4  80.0 - 100.0 fL Final    MCH 06/11/2025 31.6  26.0 - 34.0 pg Final    MCHC 06/11/2025 33.8  31.0 - 36.0 g/dL Final    RDW 06/11/2025 13.3  12.4 - 15.4 % Final    Platelets 06/11/2025 162  135 - 450 K/uL Final    MPV 06/11/2025 8.9  5.0 - 10.5 fL Final    Neutrophils % 06/11/2025 64.9  % Final    Lymphocytes % 06/11/2025 24.5  % Final    Monocytes % 06/11/2025 8.0  % Final    Eosinophils % 06/11/2025 2.1  % Final    Basophils % 06/11/2025 0.5  % Final    Neutrophils Absolute 06/11/2025 5.6  1.7 - 7.7 K/uL Final    Lymphocytes Absolute 06/11/2025 2.1  1.0 - 5.1 K/uL Final    Monocytes Absolute 06/11/2025 0.7  0.0 - 1.3

## 2025-06-13 NOTE — GROUP NOTE
Group Therapy Note    Date: 6/13/2025    Group Start Time: 1300  Group End Time: 1400  Group Topic: Activity    [facility] Shantel Deiwtt MSW        Group Therapy Note  Patient was invited to group but unable to attend.    Attendees: 4       Signature:  BRYAN Mercado

## 2025-06-13 NOTE — BH NOTE
Pt continues with . Slept until 1315 this shift. He threw most of his lunch onto the floor. He spit out his meds even though they were placed in pudding per his request. He tried to hit and bite staff. He was placed in a 30 second physical hold and received Zyprexa 10mg IM. His wife was here to visit and he was irritated with her and told her to leave, so she left.  Pt c/o of migraine headache and was medicated with Imitrex.

## 2025-06-13 NOTE — CARE COORDINATION
Pt unable to engage fully in assessment due to severity of his cognitive deficits and being non-verbal. Assessment completed primarily through chart review and collateral from wife.        25   Psychiatric History   Psychiatric history treatment Psychiatric admissions;Current treatment  (1 previous admit at [facility] in May 2025, current treatment at [facility] in [place])   Contact information N/A   Are there any medication issues?   (compliance)   Recent Psychological Experiences Recent negative physical changes   Support System   Support system Primary support persons   Types of Support System Spouse;Other (Comment)  (adult sons and staff at [facility])   Problems in support system None   Current Living Situation   Home Living Adequate   Living information Lives with others  ([facility])   Problems with living situation  No   Lack of basic needs No   SSDI/SSI unknown   Other government assistance none reported   Problems with environment denies   Current abuse issues denies   Supervised setting Extended care facility   Contact information N/A   Medical and Self-Care Issues   Relevant medical problems Parkinson's   Relevant self-care issues requires help with all ADLs and IADLs   Barriers to treatment Yes  (resistance to care & medication non-compliance)   Family Constellation   Spouse/partner-name/age Shirlye -  50 yrs   Children-names/ages 2 sons   Parents    Siblings 1 brother & 1 sister, they have never been close, no interaction with them   Contact information N/A   Support services   (N/A)   Comment N/A   Childhood   Raised by Biological mother;Biological father   Biological mother , was always \"a bitch\"   Biological father , \"had a girlfriend\" throughout marriage to pt's mother, relationship was chaotic   Relevant family history none reported   History of abuse No  (none reported)   Comment N/A   Legal History   Legal history No   Other relevant legal issues N/A   Comment

## 2025-06-13 NOTE — BH NOTE
[facility]  Treatment Team Note  Review Date & Time: 6/12/2025   0900    Patient was not in treatment team      Status EXAM:   Mental Status and Behavioral Exam  Normal: No  Level of Assistance: Total assist  Facial Expression: Hostile, Sad  Affect: Blunt  Level of Consciousness: Alert  Frequency of Checks: 1 staff: 1 patient  - continuous  Mood:Normal: No  Mood: Anxious, Labile, Irritable  Motor Activity:Normal: No  Motor Activity: Tremors  Eye Contact: Poor  Observed Behavior: Agitated  Sexual Misconduct History: Current - no  Preception: [place] to person, [place] to time, [place] to place, [place] to situation  Attention:Normal: No  Attention: Distractible  Thought Processes: Other (comment) (romy)  Thought Content:Normal: No  Thought Content: Poverty of content  Depression Symptoms: Sleep disturbance  Anxiety Symptoms: Generalized  Deann Symptoms: Labile  Hallucinations: Unable to assess  Delusions: No  Memory:Normal: No  Memory: Poor recent  Insight and Judgment: No  Insight and Judgment: Poor judgment      Suicide Risk CSSR-S:  1) Within the past month, have you wished you were dead or wished you could go to sleep and not wake up? :  (ROMY)  2) Have you actually had any thoughts of killing yourself? :  (ROMY)  3) Have you been thinking about how you might kill yourself? :  (ROMY)  5) Have you started to work out or worked out the details of how to kill yourself? Do you intend to carry out this plan? :  (ROMY)  6) Have you ever done anything, started to do anything, or prepared to do anything to end your life?:  (ROMY)      PLAN/TREATMENT RECOMMENDATIONS UPDATE:   Patient will take medication as prescribed, eat 75% of meals, attend groups, participate in milieu activities, participate in treatment team and care planning for discharge and follow up.            Rahul Jordan RN     Health Maintenance Due   Topic Date Due    Hepatitis B Vaccine (1 of 3 - 3-dose series) Never done    COVID-19 Vaccine (1) Never done    Pneumococcal Vaccine 0-64 (1 of 2 - PCV) Never done    Shingles Vaccine (1 of 2) Never done    Colorectal Cancer Risk - Colonoscopy  03/10/2020    Influenza Vaccine (1) Never done    Depression Screening  07/31/2024       Patient is due for topics as listed above but is not proceeding with Immunization(s) COVID-19, Hep B, Influenza, Pneumococcal, and Shingles, Colorectal Cancer Screening: Cologuard, and Depression Screening  at this time.

## 2025-06-13 NOTE — GROUP NOTE
Group Therapy Note    Date: 6/13/2025    Group Start Time: 1000  Group End Time: 1045  Group Topic: Activity    [facility]    Sharri Walsh LISW        Group Therapy Note    Patient was invited to group but unable to attend.      Attendees: 5    Signature:  DC Diaz

## 2025-06-14 PROCEDURE — 1240000000 HC EMOTIONAL WELLNESS R&B

## 2025-06-14 PROCEDURE — 2500000003 HC RX 250 WO HCPCS

## 2025-06-14 PROCEDURE — 6360000002 HC RX W HCPCS: Performed by: PSYCHIATRY & NEUROLOGY

## 2025-06-14 PROCEDURE — 99233 SBSQ HOSP IP/OBS HIGH 50: CPT | Performed by: PSYCHIATRY & NEUROLOGY

## 2025-06-14 PROCEDURE — 6370000000 HC RX 637 (ALT 250 FOR IP): Performed by: PSYCHIATRY & NEUROLOGY

## 2025-06-14 RX ORDER — DIAZEPAM 5 MG/1
5 TABLET ORAL 2 TIMES DAILY
Status: DISCONTINUED | OUTPATIENT
Start: 2025-06-14 | End: 2025-06-24 | Stop reason: HOSPADM

## 2025-06-14 RX ORDER — DIAZEPAM 10 MG/2ML
5 INJECTION, SOLUTION INTRAMUSCULAR; INTRAVENOUS ONCE
Status: DISCONTINUED | OUTPATIENT
Start: 2025-06-14 | End: 2025-06-14 | Stop reason: CLARIF

## 2025-06-14 RX ORDER — OLANZAPINE 10 MG/2ML
10 INJECTION, POWDER, FOR SOLUTION INTRAMUSCULAR ONCE
Status: DISCONTINUED | OUTPATIENT
Start: 2025-06-14 | End: 2025-06-15 | Stop reason: SDUPTHER

## 2025-06-14 RX ORDER — WATER 10 ML/10ML
INJECTION INTRAMUSCULAR; INTRAVENOUS; SUBCUTANEOUS
Status: COMPLETED
Start: 2025-06-14 | End: 2025-06-14

## 2025-06-14 RX ORDER — DIAZEPAM 10 MG/2ML
5 INJECTION, SOLUTION INTRAMUSCULAR; INTRAVENOUS ONCE
Status: COMPLETED | OUTPATIENT
Start: 2025-06-14 | End: 2025-06-14

## 2025-06-14 RX ORDER — DIAZEPAM 10 MG/2ML
5 INJECTION, SOLUTION INTRAMUSCULAR; INTRAVENOUS 2 TIMES DAILY
Status: DISCONTINUED | OUTPATIENT
Start: 2025-06-14 | End: 2025-06-24 | Stop reason: HOSPADM

## 2025-06-14 RX ADMIN — GABAPENTIN 100 MG: 100 CAPSULE ORAL at 20:30

## 2025-06-14 RX ADMIN — DIAZEPAM 5 MG: 5 INJECTION, SOLUTION INTRAMUSCULAR; INTRAVENOUS at 11:00

## 2025-06-14 RX ADMIN — HYDROCODONE BITARTRATE AND ACETAMINOPHEN 1 TABLET: 5; 325 TABLET ORAL at 14:49

## 2025-06-14 RX ADMIN — OLANZAPINE 5 MG: 10 INJECTION, POWDER, FOR SOLUTION INTRAMUSCULAR at 22:25

## 2025-06-14 RX ADMIN — RIVASTIGMINE TARTRATE 1.5 MG: 1.5 CAPSULE ORAL at 20:29

## 2025-06-14 RX ADMIN — ANORECTAL OINTMENT: 15.7; .44; 24; 20.6 OINTMENT TOPICAL at 11:54

## 2025-06-14 RX ADMIN — WHITE PETROLATUM: 1.75 OINTMENT TOPICAL at 11:55

## 2025-06-14 RX ADMIN — WATER: 1 INJECTION INTRAMUSCULAR; INTRAVENOUS; SUBCUTANEOUS at 22:28

## 2025-06-14 RX ADMIN — LEVODOPA AND CARBIDOPA 1 CAPSULE: 245; 61.25 CAPSULE, EXTENDED RELEASE ORAL at 20:29

## 2025-06-14 RX ADMIN — DIVALPROEX SODIUM 250 MG: 125 CAPSULE, COATED PELLETS ORAL at 20:30

## 2025-06-14 RX ADMIN — OLANZAPINE 5 MG: 10 INJECTION, POWDER, FOR SOLUTION INTRAMUSCULAR at 03:45

## 2025-06-14 RX ADMIN — MICONAZOLE NITRATE: 20 POWDER TOPICAL at 11:54

## 2025-06-14 RX ADMIN — DIAZEPAM 5 MG: 5 TABLET ORAL at 20:29

## 2025-06-14 ASSESSMENT — PAIN SCALES - GENERAL
PAINLEVEL_OUTOF10: 0
PAINLEVEL_OUTOF10: 6

## 2025-06-14 ASSESSMENT — PAIN SCALES - WONG BAKER: WONGBAKER_NUMERICALRESPONSE: NO HURT

## 2025-06-14 ASSESSMENT — PAIN DESCRIPTION - ORIENTATION: ORIENTATION: OTHER (COMMENT)

## 2025-06-14 ASSESSMENT — PAIN DESCRIPTION - LOCATION: LOCATION: HEAD

## 2025-06-14 ASSESSMENT — PAIN - FUNCTIONAL ASSESSMENT: PAIN_FUNCTIONAL_ASSESSMENT: ACTIVITIES ARE NOT PREVENTED

## 2025-06-14 ASSESSMENT — PAIN DESCRIPTION - DESCRIPTORS: DESCRIPTORS: ACHING

## 2025-06-14 NOTE — BH NOTE
Bedside Mobility Assessment Tool (BMAT):     Assessment Level 1- Sit and Shake    1. From a semi-reclined position, ask patient to sit up and rotate to a seated position at the side of the bed. Can use the bedrail.    2. Ask patient to reach out and grab your hand and shake making sure patient reaches across his/her midline.   Fail- Patient is unable to perform tasks, patient is MOBILITY LEVEL 1.    Assessment Level 2- Stretch and Point   1. With patient in seated position at the side of the bed, have patient place both feet on the floor (or stool) with knees no higher than hips.    2. Ask patient to stretch one leg and straighten the knee, then bend the ankle/flex and point the toes. If appropriate, repeat with the other leg.   Fail- Patient is unable to complete task. Patient is MOBILITY LEVEL 2.     Assessment Level 3- Stand   1. Ask patient to elevate off the bed or chair (seated to standing) using an assistive device (cane, bedrail).    2. Patient should be able to raise buttocks off be and hold for a count of five. May repeat once.   Fail- Patient unable to demonstrate standing stability. Patient is MOBILITY LEVEL 3.     Assessment Level 4- Walk   1. Ask patient to march in place at bedside.    2. Then ask patient to advance step and return each foot. Some medical conditions may render a patient from stepping backwards, use your best clinical judgement.   Fail- Patient not able to complete tasks OR requires use of assistive device. Patient is MOBILITY LEVEL 3.       Mobility Level- 1

## 2025-06-14 NOTE — BH NOTE
Patient heard becoming aggressive with , new order for Zyprexa 10 mg IM once  per Dr. Ochoa  stated patient observed pulling at his eyelashes and patient reports a headache, attempted to administer Norco 5/325mg PO but patient refused, patient appears calm at this moment will hold Zyprexa IM for now.

## 2025-06-14 NOTE — BH NOTE
[facility]  Treatment Team Note  Review Date & Time: 6/14/2025   0910    Patient was not in treatment team      Status EXAM:   Mental Status and Behavioral Exam  Normal: No  Level of Assistance: Total assist  Facial Expression: Hostile  Affect: Blunt, Inappropriate  Level of Consciousness: Alert  Frequency of Checks: 1 staff: 1 patient  - continuous  Mood:Normal: No  Mood: Anxious, Irritable, Labile  Motor Activity:Normal: No  Motor Activity: Agitated, Tremors  Eye Contact: Poor  Observed Behavior: Aggressive, Threatening, Agitated, Hostile, Impulsive  Sexual Misconduct History: Current - no  Preception: [place] to person  Attention:Normal: No  Attention: Distractible, Unable to concentrate  Thought Processes: Other (comment) ([place])  Thought Content:Normal: No  Thought Content: Poverty of content  Depression Symptoms: Appetite change, Impaired concentration  Anxiety Symptoms: Generalized  Deann Symptoms: Poor judgment  Hallucinations: Unable to assess  Delusions: No  Memory:Normal: No  Memory: Poor recent, Poor remote  Insight and Judgment: No  Insight and Judgment: Poor judgment, Poor insight      Suicide Risk CSSR-S:  1) Within the past month, have you wished you were dead or wished you could go to sleep and not wake up? :  ([place])  2) Have you actually had any thoughts of killing yourself? :  ([place])  3) Have you been thinking about how you might kill yourself? :  ([place])  5) Have you started to work out or worked out the details of how to kill yourself? Do you intend to carry out this plan? :  (SPRING)  6) Have you ever done anything, started to do anything, or prepared to do anything to end your life?:  (SPRING)      PLAN/TREATMENT RECOMMENDATIONS UPDATE:     Patient will take medication as prescribed, eat 75% of meals, attend groups, participate in milieu activities, participate in treatment team and care planning for discharge and follow up.       Desi Roche RN

## 2025-06-14 NOTE — BH NOTE
Patient refuse all PM medications, spitting them on this nurse and , patient also hitting,cursing and calling staff derogatory names, trying to bite staff saturating a tissue in his mouth and spitting it in this nurses face, unable to redirect Dr. Ochoa notified and new one time order for Zyprexa 10 mg IM given in left deltoid no hold was necessary and patient tolerated well but  when trying to take patient to his room he scooted himself onto the floor from his wheelchair, staff was able to place him back in his chair and transfer him to his bed, no injury was noted, 1-1  at bedside.

## 2025-06-14 NOTE — BH NOTE
Bedside Mobility Assessment Tool (BMAT):     Assessment Level 1- Sit and Shake    1. From a semi-reclined position, ask patient to sit up and rotate to a seated position at the side of the bed. Can use the bedrail.    2. Ask patient to reach out and grab your hand and shake making sure patient reaches across his/her midline.   Pass- Patient is able to come to a seated position, maintain core strength. Maintains seated balance while reaching across midline. Move on to Assessment Level 2.     Assessment Level 2- Stretch and Point   1. With patient in seated position at the side of the bed, have patient place both feet on the floor (or stool) with knees no higher than hips.    2. Ask patient to stretch one leg and straighten the knee, then bend the ankle/flex and point the toes. If appropriate, repeat with the other leg.   Pass- Patient is able to demonstrate appropriate quad strength on intended weight bearing limb(s). Move onto Assessment Level 3.     Assessment Level 3- Stand   1. Ask patient to elevate off the bed or chair (seated to standing) using an assistive device (cane, bedrail).    2. Patient should be able to raise buttocks off be and hold for a count of five. May repeat once.   Fail- Patient unable to demonstrate standing stability. Patient is MOBILITY LEVEL 3.     Assessment Level 4- Walk   1. Ask patient to march in place at bedside.    2. Then ask patient to advance step and return each foot. Some medical conditions may render a patient from stepping backwards, use your best clinical judgement.   Fail- Patient not able to complete tasks OR requires use of assistive device. Patient is MOBILITY LEVEL 3.       Mobility Level- 2

## 2025-06-15 ENCOUNTER — APPOINTMENT (OUTPATIENT)
Dept: CT IMAGING | Age: 75
DRG: 884 | End: 2025-06-15
Payer: MEDICARE

## 2025-06-15 PROCEDURE — 6360000002 HC RX W HCPCS: Performed by: PSYCHIATRY & NEUROLOGY

## 2025-06-15 PROCEDURE — 2500000003 HC RX 250 WO HCPCS

## 2025-06-15 PROCEDURE — 70450 CT HEAD/BRAIN W/O DYE: CPT

## 2025-06-15 PROCEDURE — 6370000000 HC RX 637 (ALT 250 FOR IP): Performed by: PSYCHIATRY & NEUROLOGY

## 2025-06-15 PROCEDURE — 99231 SBSQ HOSP IP/OBS SF/LOW 25: CPT | Performed by: NURSE PRACTITIONER

## 2025-06-15 PROCEDURE — 1240000000 HC EMOTIONAL WELLNESS R&B

## 2025-06-15 PROCEDURE — 6360000002 HC RX W HCPCS

## 2025-06-15 RX ORDER — WATER 10 ML/10ML
INJECTION INTRAMUSCULAR; INTRAVENOUS; SUBCUTANEOUS
Status: COMPLETED
Start: 2025-06-15 | End: 2025-06-15

## 2025-06-15 RX ORDER — OLANZAPINE 10 MG/2ML
10 INJECTION, POWDER, FOR SOLUTION INTRAMUSCULAR ONCE
Status: COMPLETED | OUTPATIENT
Start: 2025-06-15 | End: 2025-06-15

## 2025-06-15 RX ORDER — HALOPERIDOL 5 MG/ML
5 INJECTION INTRAMUSCULAR ONCE
Status: DISCONTINUED | OUTPATIENT
Start: 2025-06-15 | End: 2025-06-15

## 2025-06-15 RX ADMIN — WHITE PETROLATUM: 1.75 OINTMENT TOPICAL at 10:27

## 2025-06-15 RX ADMIN — MICONAZOLE NITRATE: 20 POWDER TOPICAL at 10:28

## 2025-06-15 RX ADMIN — LEVODOPA AND CARBIDOPA 1 CAPSULE: 245; 61.25 CAPSULE, EXTENDED RELEASE ORAL at 20:32

## 2025-06-15 RX ADMIN — ANORECTAL OINTMENT: 15.7; .44; 24; 20.6 OINTMENT TOPICAL at 10:28

## 2025-06-15 RX ADMIN — OLANZAPINE 5 MG: 10 INJECTION, POWDER, FOR SOLUTION INTRAMUSCULAR at 10:24

## 2025-06-15 RX ADMIN — LEVODOPA AND CARBIDOPA 1 CAPSULE: 245; 61.25 CAPSULE, EXTENDED RELEASE ORAL at 18:05

## 2025-06-15 RX ADMIN — DIAZEPAM 5 MG: 5 TABLET ORAL at 20:23

## 2025-06-15 RX ADMIN — RIVASTIGMINE TARTRATE 1.5 MG: 1.5 CAPSULE ORAL at 20:22

## 2025-06-15 RX ADMIN — DIVALPROEX SODIUM 250 MG: 125 CAPSULE, COATED PELLETS ORAL at 20:23

## 2025-06-15 RX ADMIN — WATER 10 ML: 1 INJECTION INTRAMUSCULAR; INTRAVENOUS; SUBCUTANEOUS at 15:07

## 2025-06-15 RX ADMIN — OLANZAPINE 10 MG: 10 INJECTION, POWDER, FOR SOLUTION INTRAMUSCULAR at 15:06

## 2025-06-15 RX ADMIN — GABAPENTIN 100 MG: 100 CAPSULE ORAL at 20:23

## 2025-06-15 RX ADMIN — HYDROCODONE BITARTRATE AND ACETAMINOPHEN 1 TABLET: 5; 325 TABLET ORAL at 15:12

## 2025-06-15 RX ADMIN — DIAZEPAM 5 MG: 5 INJECTION, SOLUTION INTRAMUSCULAR; INTRAVENOUS at 09:08

## 2025-06-15 ASSESSMENT — PAIN DESCRIPTION - ORIENTATION: ORIENTATION: UPPER

## 2025-06-15 ASSESSMENT — PAIN - FUNCTIONAL ASSESSMENT: PAIN_FUNCTIONAL_ASSESSMENT: ACTIVITIES ARE NOT PREVENTED

## 2025-06-15 ASSESSMENT — PAIN DESCRIPTION - DESCRIPTORS: DESCRIPTORS: PATIENT UNABLE TO DESCRIBE

## 2025-06-15 ASSESSMENT — PAIN SCALES - GENERAL
PAINLEVEL_OUTOF10: 0
PAINLEVEL_OUTOF10: 0

## 2025-06-15 ASSESSMENT — PAIN SCALES - WONG BAKER: WONGBAKER_NUMERICALRESPONSE: NO HURT

## 2025-06-15 ASSESSMENT — PAIN DESCRIPTION - LOCATION: LOCATION: HEAD

## 2025-06-15 NOTE — BH NOTE
PRN Norco given. Patient holding his head, rocking back and forth.  Asked patient if he wanted pain medicine for his head, he nodded yes.

## 2025-06-15 NOTE — BH NOTE
Patient calmer, sitting in wheel chair eating a soft cookie and drinking Pepsi. No distress at this time. Zyprexa effective.
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
## 2025-06-16 PROCEDURE — 6370000000 HC RX 637 (ALT 250 FOR IP): Performed by: PSYCHIATRY & NEUROLOGY

## 2025-06-16 PROCEDURE — 6370000000 HC RX 637 (ALT 250 FOR IP)

## 2025-06-16 PROCEDURE — 99233 SBSQ HOSP IP/OBS HIGH 50: CPT | Performed by: PSYCHIATRY & NEUROLOGY

## 2025-06-16 PROCEDURE — 1240000000 HC EMOTIONAL WELLNESS R&B

## 2025-06-16 RX ORDER — RIVASTIGMINE 4.6 MG/24H
1 PATCH, EXTENDED RELEASE TRANSDERMAL DAILY
Status: DISCONTINUED | OUTPATIENT
Start: 2025-06-16 | End: 2025-06-16

## 2025-06-16 RX ORDER — RIVASTIGMINE TARTRATE 1.5 MG/1
1.5 CAPSULE ORAL 2 TIMES DAILY
Status: COMPLETED | OUTPATIENT
Start: 2025-06-16 | End: 2025-06-16

## 2025-06-16 RX ORDER — DIVALPROEX SODIUM 125 MG/1
500 CAPSULE, COATED PELLETS ORAL
Status: DISCONTINUED | OUTPATIENT
Start: 2025-06-17 | End: 2025-06-24 | Stop reason: HOSPADM

## 2025-06-16 RX ORDER — RIVASTIGMINE 4.6 MG/24H
1 PATCH, EXTENDED RELEASE TRANSDERMAL DAILY
Status: DISCONTINUED | OUTPATIENT
Start: 2025-06-17 | End: 2025-06-16

## 2025-06-16 RX ORDER — OLANZAPINE 5 MG/1
5 TABLET, FILM COATED ORAL 2 TIMES DAILY
Status: DISCONTINUED | OUTPATIENT
Start: 2025-06-16 | End: 2025-06-19

## 2025-06-16 RX ORDER — RIVASTIGMINE 4.6 MG/24H
1 PATCH, EXTENDED RELEASE TRANSDERMAL DAILY
Status: DISCONTINUED | OUTPATIENT
Start: 2025-06-17 | End: 2025-06-24 | Stop reason: HOSPADM

## 2025-06-16 RX ADMIN — WHITE PETROLATUM: 1.75 OINTMENT TOPICAL at 21:08

## 2025-06-16 RX ADMIN — OLANZAPINE 5 MG: 5 TABLET, FILM COATED ORAL at 17:36

## 2025-06-16 RX ADMIN — RIVASTIGMINE TARTRATE 1.5 MG: 1.5 CAPSULE ORAL at 20:36

## 2025-06-16 RX ADMIN — LEVODOPA AND CARBIDOPA 1 CAPSULE: 245; 61.25 CAPSULE, EXTENDED RELEASE ORAL at 17:36

## 2025-06-16 RX ADMIN — MICONAZOLE NITRATE: 20 POWDER TOPICAL at 21:10

## 2025-06-16 RX ADMIN — DIAZEPAM 5 MG: 5 TABLET ORAL at 20:36

## 2025-06-16 RX ADMIN — RIVASTIGMINE TARTRATE 1.5 MG: 1.5 CAPSULE ORAL at 17:35

## 2025-06-16 RX ADMIN — FUROSEMIDE 40 MG: 40 TABLET ORAL at 17:36

## 2025-06-16 RX ADMIN — LEVODOPA AND CARBIDOPA 1 CAPSULE: 245; 61.25 CAPSULE, EXTENDED RELEASE ORAL at 20:35

## 2025-06-16 RX ADMIN — ANORECTAL OINTMENT: 15.7; .44; 24; 20.6 OINTMENT TOPICAL at 21:09

## 2025-06-16 NOTE — GROUP NOTE
Group Therapy Note    Date: 6/16/2025    Group Start Time: 1300  Group End Time: 1345  Group Topic: Cognitive Skills    [facility] [place] [facility]    Elana Pang LPC; Samantha Beavers    Patient was invited to group but unable to attend.      Group Therapy Note    Attendees: 8         Signature:  JAQUELINE Vences, Art Therapy and Counseling student

## 2025-06-16 NOTE — GROUP NOTE
Group Therapy Note    Date: 6/16/2025    Group Start Time: 1100  Group End Time: 1145  Group Topic: Activity    [facility] [place] [facility]    Sharri Walsh LISW        Group Therapy Note    Patient was invited to group but unable to attend.      Attendees: 5    Signature:  DC Diaz

## 2025-06-17 PROCEDURE — 1240000000 HC EMOTIONAL WELLNESS R&B

## 2025-06-17 PROCEDURE — 6360000002 HC RX W HCPCS: Performed by: PSYCHIATRY & NEUROLOGY

## 2025-06-17 PROCEDURE — 99233 SBSQ HOSP IP/OBS HIGH 50: CPT | Performed by: PSYCHIATRY & NEUROLOGY

## 2025-06-17 PROCEDURE — 6370000000 HC RX 637 (ALT 250 FOR IP): Performed by: PSYCHIATRY & NEUROLOGY

## 2025-06-17 PROCEDURE — 2500000003 HC RX 250 WO HCPCS

## 2025-06-17 RX ORDER — WATER 10 ML/10ML
INJECTION INTRAMUSCULAR; INTRAVENOUS; SUBCUTANEOUS
Status: COMPLETED
Start: 2025-06-17 | End: 2025-06-17

## 2025-06-17 RX ADMIN — DIAZEPAM 5 MG: 5 TABLET ORAL at 20:31

## 2025-06-17 RX ADMIN — LEVODOPA AND CARBIDOPA 1 CAPSULE: 245; 61.25 CAPSULE, EXTENDED RELEASE ORAL at 16:34

## 2025-06-17 RX ADMIN — TRAZODONE HYDROCHLORIDE 50 MG: 50 TABLET ORAL at 20:31

## 2025-06-17 RX ADMIN — WHITE PETROLATUM: 1.75 OINTMENT TOPICAL at 21:20

## 2025-06-17 RX ADMIN — MICONAZOLE NITRATE: 20 POWDER TOPICAL at 21:18

## 2025-06-17 RX ADMIN — DIVALPROEX SODIUM 500 MG: 125 CAPSULE, COATED PELLETS ORAL at 12:53

## 2025-06-17 RX ADMIN — WATER 10 ML: 1 INJECTION INTRAMUSCULAR; INTRAVENOUS; SUBCUTANEOUS at 14:20

## 2025-06-17 RX ADMIN — LEVODOPA AND CARBIDOPA 1 CAPSULE: 245; 61.25 CAPSULE, EXTENDED RELEASE ORAL at 12:55

## 2025-06-17 RX ADMIN — ANORECTAL OINTMENT: 15.7; .44; 24; 20.6 OINTMENT TOPICAL at 21:16

## 2025-06-17 RX ADMIN — OLANZAPINE 5 MG: 10 INJECTION, POWDER, FOR SOLUTION INTRAMUSCULAR at 14:20

## 2025-06-17 RX ADMIN — OLANZAPINE 5 MG: 5 TABLET, FILM COATED ORAL at 12:52

## 2025-06-17 RX ADMIN — OLANZAPINE 5 MG: 5 TABLET, FILM COATED ORAL at 20:31

## 2025-06-17 RX ADMIN — HYDROCODONE BITARTRATE AND ACETAMINOPHEN 1 TABLET: 5; 325 TABLET ORAL at 14:26

## 2025-06-17 RX ADMIN — LEVODOPA AND CARBIDOPA 1 CAPSULE: 245; 61.25 CAPSULE, EXTENDED RELEASE ORAL at 20:31

## 2025-06-17 RX ADMIN — DIAZEPAM 5 MG: 5 TABLET ORAL at 12:52

## 2025-06-17 ASSESSMENT — PAIN SCALES - WONG BAKER: WONGBAKER_NUMERICALRESPONSE: NO HURT

## 2025-06-17 NOTE — GROUP NOTE
Group Therapy Note    Date: 6/17/2025    Group Start Time: 0830  Group End Time: 0930  Group Topic: Activity    [facility] [place] Shantel Dewitt MSW        Patient was invited to group but unable to attend.      Attendees: 4       Signature:  BRYAN Mercado

## 2025-06-17 NOTE — GROUP NOTE
Group Therapy Note    Date: 6/17/2025    Group Start Time: 1100  Group End Time: 1145  Group Topic: Activity    [facility] [place] [facility]    Sharri Walsh LISW        Group Therapy Note    Patient was invited to group but unable to attend.      Attendees: 4    Signature:  DC Diaz

## 2025-06-18 PROCEDURE — 6370000000 HC RX 637 (ALT 250 FOR IP): Performed by: PSYCHIATRY & NEUROLOGY

## 2025-06-18 PROCEDURE — 6360000002 HC RX W HCPCS: Performed by: NURSE PRACTITIONER

## 2025-06-18 PROCEDURE — 99233 SBSQ HOSP IP/OBS HIGH 50: CPT | Performed by: PSYCHIATRY & NEUROLOGY

## 2025-06-18 PROCEDURE — 1240000000 HC EMOTIONAL WELLNESS R&B

## 2025-06-18 RX ORDER — OLANZAPINE 10 MG/2ML
10 INJECTION, POWDER, FOR SOLUTION INTRAMUSCULAR EVERY 6 HOURS PRN
Status: DISCONTINUED | OUTPATIENT
Start: 2025-06-18 | End: 2025-06-24 | Stop reason: HOSPADM

## 2025-06-18 RX ORDER — WATER 10 ML/10ML
INJECTION INTRAMUSCULAR; INTRAVENOUS; SUBCUTANEOUS
Status: DISPENSED
Start: 2025-06-18 | End: 2025-06-18

## 2025-06-18 RX ORDER — OLANZAPINE 5 MG/1
5 TABLET, FILM COATED ORAL EVERY 6 HOURS PRN
Status: DISCONTINUED | OUTPATIENT
Start: 2025-06-18 | End: 2025-06-24 | Stop reason: HOSPADM

## 2025-06-18 RX ADMIN — TAMSULOSIN HYDROCHLORIDE 0.4 MG: 0.4 CAPSULE ORAL at 10:21

## 2025-06-18 RX ADMIN — HYDROCODONE BITARTRATE AND ACETAMINOPHEN 1 TABLET: 5; 325 TABLET ORAL at 10:23

## 2025-06-18 RX ADMIN — PANTOPRAZOLE SODIUM 40 MG: 40 TABLET, DELAYED RELEASE ORAL at 10:25

## 2025-06-18 RX ADMIN — DIAZEPAM 5 MG: 5 TABLET ORAL at 10:21

## 2025-06-18 RX ADMIN — CELECOXIB 200 MG: 200 CAPSULE ORAL at 10:20

## 2025-06-18 RX ADMIN — LEVODOPA AND CARBIDOPA 1 CAPSULE: 245; 61.25 CAPSULE, EXTENDED RELEASE ORAL at 10:20

## 2025-06-18 RX ADMIN — ANORECTAL OINTMENT: 15.7; .44; 24; 20.6 OINTMENT TOPICAL at 11:51

## 2025-06-18 RX ADMIN — OLANZAPINE 10 MG: 10 INJECTION, POWDER, FOR SOLUTION INTRAMUSCULAR at 20:41

## 2025-06-18 RX ADMIN — OLANZAPINE 5 MG: 5 TABLET, FILM COATED ORAL at 10:21

## 2025-06-18 RX ADMIN — DIVALPROEX SODIUM 500 MG: 125 CAPSULE, COATED PELLETS ORAL at 10:25

## 2025-06-18 RX ADMIN — Medication 1000 MCG: at 10:20

## 2025-06-18 RX ADMIN — OLANZAPINE 10 MG: 10 INJECTION, POWDER, FOR SOLUTION INTRAMUSCULAR at 05:34

## 2025-06-18 RX ADMIN — WHITE PETROLATUM: 1.75 OINTMENT TOPICAL at 11:50

## 2025-06-18 RX ADMIN — MICONAZOLE NITRATE: 20 POWDER TOPICAL at 11:49

## 2025-06-18 ASSESSMENT — PAIN SCALES - GENERAL: PAINLEVEL_OUTOF10: 2

## 2025-06-18 NOTE — GROUP NOTE
Group Therapy Note    Date: 6/18/2025    Group Start Time: 1000  Group End Time: 1045  Group Topic: Cognitive Skills    [facility] [place] [facility]    Elana Pang LPC    Patient was invited to group but unable to attend.      Group Therapy Note           Signature:  Elana Pang LPC

## 2025-06-18 NOTE — BH NOTE
Pt is A&O to self and time. Pt states \"I know where I am\" and nodded in agreement when told he is at the hospital. He took medications in vanilla pudding. 1:1 safety sitter continued. Denies SI/HI/AVH by nodding his head no but is observed to be grasping the air and calling out to his wife whom is not here. Pt ate 100% of breakfast and 75% of lunch. Pt allowed writer and tech to bathe him with bath wipes, change his gown and socks and change linens. His teeth were brushed with a mouth swab and toothette, pt smiled to show teeth in assistance. Pt refused a brief but is offered the urinal every hour. He allowed writer to place ID and fall wrist band on wrist. He was up in the wheelchair and in the dayroom in the morning and early afternoon. Assist x2 with gait belt and wheelchair in which pt allowed staff to use gait belt when transferring. He had a period of irritability this morning when he stated his wife was here and was wondering where she was. Pt educated on visiting hour and made phone call to wife for verbal redirection, which was effective. Pt calm and cooperative otherwise. Pt is in bed resting with eyes closed at this time. Plan of care ongoing.
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
## 2025-06-18 NOTE — BH NOTE
PRN Norco given @1023 for right arm pain and possible pain in head. Pt states the medication was effective and is satisfied at this time.

## 2025-06-18 NOTE — GROUP NOTE
Group Therapy Note    Date: 6/18/2025    Group Start Time: 1330  Group End Time: 1415  Group Topic: Activity    [facility] [place] [facility]    Sharri Walsh LISW        Group Therapy Note    Patient was invited to group but unable to attend.      Attendees: 5     Signature:  DC Diaz

## 2025-06-19 LAB
GLUCOSE BLD-MCNC: 249 MG/DL (ref 70–99)
PERFORMED ON: ABNORMAL

## 2025-06-19 PROCEDURE — 6360000002 HC RX W HCPCS: Performed by: PSYCHIATRY & NEUROLOGY

## 2025-06-19 PROCEDURE — 99233 SBSQ HOSP IP/OBS HIGH 50: CPT | Performed by: PSYCHIATRY & NEUROLOGY

## 2025-06-19 PROCEDURE — 6370000000 HC RX 637 (ALT 250 FOR IP)

## 2025-06-19 PROCEDURE — 6370000000 HC RX 637 (ALT 250 FOR IP): Performed by: PSYCHIATRY & NEUROLOGY

## 2025-06-19 PROCEDURE — 1240000000 HC EMOTIONAL WELLNESS R&B

## 2025-06-19 RX ORDER — OLANZAPINE 5 MG/1
5 TABLET, FILM COATED ORAL
Status: DISCONTINUED | OUTPATIENT
Start: 2025-06-19 | End: 2025-06-24 | Stop reason: HOSPADM

## 2025-06-19 RX ADMIN — CELECOXIB 200 MG: 200 CAPSULE ORAL at 13:13

## 2025-06-19 RX ADMIN — HYDROCODONE BITARTRATE AND ACETAMINOPHEN 1 TABLET: 5; 325 TABLET ORAL at 15:10

## 2025-06-19 RX ADMIN — FUROSEMIDE 40 MG: 40 TABLET ORAL at 17:42

## 2025-06-19 RX ADMIN — OLANZAPINE 5 MG: 5 TABLET, FILM COATED ORAL at 17:42

## 2025-06-19 RX ADMIN — LEVODOPA AND CARBIDOPA 1 CAPSULE: 245; 61.25 CAPSULE, EXTENDED RELEASE ORAL at 17:42

## 2025-06-19 RX ADMIN — DIAZEPAM 5 MG: 5 TABLET ORAL at 00:45

## 2025-06-19 RX ADMIN — TRAZODONE HYDROCHLORIDE 50 MG: 50 TABLET ORAL at 00:45

## 2025-06-19 RX ADMIN — MICONAZOLE NITRATE: 20 POWDER TOPICAL at 22:56

## 2025-06-19 RX ADMIN — LEVODOPA AND CARBIDOPA 1 CAPSULE: 245; 61.25 CAPSULE, EXTENDED RELEASE ORAL at 13:13

## 2025-06-19 RX ADMIN — HYDROCODONE BITARTRATE AND ACETAMINOPHEN 1 TABLET: 5; 325 TABLET ORAL at 23:25

## 2025-06-19 RX ADMIN — ANORECTAL OINTMENT: 15.7; .44; 24; 20.6 OINTMENT TOPICAL at 22:57

## 2025-06-19 RX ADMIN — DIAZEPAM 5 MG: 5 INJECTION, SOLUTION INTRAMUSCULAR; INTRAVENOUS at 22:57

## 2025-06-19 RX ADMIN — WHITE PETROLATUM: 1.75 OINTMENT TOPICAL at 22:56

## 2025-06-19 ASSESSMENT — PAIN SCALES - GENERAL
PAINLEVEL_OUTOF10: 4
PAINLEVEL_OUTOF10: 6
PAINLEVEL_OUTOF10: 5
PAINLEVEL_OUTOF10: 0
PAINLEVEL_OUTOF10: 4

## 2025-06-19 ASSESSMENT — PAIN SCALES - WONG BAKER
WONGBAKER_NUMERICALRESPONSE: NO HURT
WONGBAKER_NUMERICALRESPONSE: HURTS LITTLE MORE
WONGBAKER_NUMERICALRESPONSE: NO HURT
WONGBAKER_NUMERICALRESPONSE: NO HURT

## 2025-06-19 ASSESSMENT — PAIN DESCRIPTION - ORIENTATION: ORIENTATION: RIGHT

## 2025-06-19 ASSESSMENT — PAIN DESCRIPTION - LOCATION
LOCATION: HEAD
LOCATION: SHOULDER
LOCATION: OTHER (COMMENT)

## 2025-06-19 ASSESSMENT — PAIN DESCRIPTION - DESCRIPTORS
DESCRIPTORS: ACHING
DESCRIPTORS: OTHER (COMMENT)
DESCRIPTORS: ACHING

## 2025-06-19 ASSESSMENT — PAIN - FUNCTIONAL ASSESSMENT
PAIN_FUNCTIONAL_ASSESSMENT: ACTIVITIES ARE NOT PREVENTED
PAIN_FUNCTIONAL_ASSESSMENT: ACTIVITIES ARE NOT PREVENTED

## 2025-06-19 NOTE — GROUP NOTE
Group Therapy Note    Date: 6/19/2025    Group Start Time: 1330  Group End Time: 1430  Group Topic: Psychoeducation    [facility] [place] LSW        Group Therapy Note  Patient was invited to group but unable to attend.     Attendees: 7     Signature:  ENE Knwoles

## 2025-06-19 NOTE — CARE COORDINATION
Spoke with wife regarding making a hospice referral for pt for care when [facility] to [facility]. Wife consented to the referral. Will contact [facility] to see which agency they prefer.

## 2025-06-19 NOTE — GROUP NOTE
Group Therapy Note    Date: 6/19/2025    Group Start Time: 1100  Group End Time: 1200  Group Topic: Activity    [facility] [place] Mariela Jenkins LSW        Group Therapy Note  Patient was invited to group but unable to attend.    Attendees: 7     Signature:  ENE Knowles

## 2025-06-20 PROCEDURE — 1240000000 HC EMOTIONAL WELLNESS R&B

## 2025-06-20 PROCEDURE — 6370000000 HC RX 637 (ALT 250 FOR IP): Performed by: PSYCHIATRY & NEUROLOGY

## 2025-06-20 PROCEDURE — 99233 SBSQ HOSP IP/OBS HIGH 50: CPT | Performed by: PSYCHIATRY & NEUROLOGY

## 2025-06-20 PROCEDURE — 6370000000 HC RX 637 (ALT 250 FOR IP)

## 2025-06-20 RX ADMIN — OLANZAPINE 5 MG: 5 TABLET, FILM COATED ORAL at 17:04

## 2025-06-20 RX ADMIN — DIVALPROEX SODIUM 500 MG: 125 CAPSULE, COATED PELLETS ORAL at 09:16

## 2025-06-20 RX ADMIN — CELECOXIB 200 MG: 200 CAPSULE ORAL at 09:16

## 2025-06-20 RX ADMIN — WHITE PETROLATUM: 1.75 OINTMENT TOPICAL at 21:54

## 2025-06-20 RX ADMIN — HYDROCODONE BITARTRATE AND ACETAMINOPHEN 1 TABLET: 5; 325 TABLET ORAL at 09:15

## 2025-06-20 RX ADMIN — FUROSEMIDE 40 MG: 40 TABLET ORAL at 09:16

## 2025-06-20 RX ADMIN — MICONAZOLE NITRATE: 20 POWDER TOPICAL at 21:53

## 2025-06-20 RX ADMIN — DIAZEPAM 5 MG: 5 TABLET ORAL at 09:15

## 2025-06-20 RX ADMIN — ANORECTAL OINTMENT: 15.7; .44; 24; 20.6 OINTMENT TOPICAL at 21:53

## 2025-06-20 RX ADMIN — LEVODOPA AND CARBIDOPA 1 CAPSULE: 245; 61.25 CAPSULE, EXTENDED RELEASE ORAL at 17:03

## 2025-06-20 RX ADMIN — LEVODOPA AND CARBIDOPA 1 CAPSULE: 245; 61.25 CAPSULE, EXTENDED RELEASE ORAL at 09:17

## 2025-06-20 RX ADMIN — FUROSEMIDE 40 MG: 40 TABLET ORAL at 17:04

## 2025-06-20 RX ADMIN — LEVODOPA AND CARBIDOPA 1 CAPSULE: 245; 61.25 CAPSULE, EXTENDED RELEASE ORAL at 20:25

## 2025-06-20 RX ADMIN — DIAZEPAM 5 MG: 5 TABLET ORAL at 20:25

## 2025-06-20 RX ADMIN — LISINOPRIL 10 MG: 10 TABLET ORAL at 09:16

## 2025-06-20 ASSESSMENT — PAIN SCALES - GENERAL
PAINLEVEL_OUTOF10: 0
PAINLEVEL_OUTOF10: 6
PAINLEVEL_OUTOF10: 6
PAINLEVEL_OUTOF10: 0

## 2025-06-20 ASSESSMENT — PAIN DESCRIPTION - DESCRIPTORS: DESCRIPTORS: ACHING

## 2025-06-20 ASSESSMENT — PAIN - FUNCTIONAL ASSESSMENT: PAIN_FUNCTIONAL_ASSESSMENT: ACTIVITIES ARE NOT PREVENTED

## 2025-06-20 ASSESSMENT — PAIN DESCRIPTION - LOCATION: LOCATION: BACK

## 2025-06-20 ASSESSMENT — PAIN SCALES - WONG BAKER: WONGBAKER_NUMERICALRESPONSE: NO HURT

## 2025-06-20 NOTE — CARE COORDINATION
Spoke with Jennifer with [facility] in [place] and provided update on pt's progress and treatment plan. Discussed potential DC early next week dependent on pt's progress over the weekend. Will call Jennifer on Monday and discuss further. Also discussed referral for Hospice and obtained a list of providers they contract with.           Spoke with [facility] and initiated referral for pt. They contacted wife and scheduled to come to unit ot assess pt on Monday, 6/23 at 10:00 am

## 2025-06-20 NOTE — GROUP NOTE
Group Therapy Note    Date: 6/20/2025    Group Start Time: 1100  Group End Time: 1145  Group Topic: Cognitive Skills    Faheem Lynch        Group Therapy Note    Attendees: 5    Patient was invited to group but unable to attend.       Signature:  FAHEEM STAUFFER

## 2025-06-20 NOTE — GROUP NOTE
Group Therapy Note    Date: 6/20/2025    Group Start Time: 0815  Group End Time: 0900  Group Topic: Psychoeducation    [facility] [place] Sh[place]el Dewitt MSW        Patient was invited to group but unable to attend.      Attendees: 2       Signature:  BRYAN Mercado

## 2025-06-20 NOTE — GROUP NOTE
Group Therapy Note    Date: 6/20/2025    Group Start Time: 1330  Group End Time: 1430  Group Topic: Activity    [facility] [place]        Group Therapy Note  Patient was invited to group but unable to attend.    Attendees: 5     Signature:  ENE Knowles

## 2025-06-21 PROCEDURE — 6370000000 HC RX 637 (ALT 250 FOR IP): Performed by: NURSE PRACTITIONER

## 2025-06-21 PROCEDURE — 6370000000 HC RX 637 (ALT 250 FOR IP): Performed by: PSYCHIATRY & NEUROLOGY

## 2025-06-21 PROCEDURE — 99233 SBSQ HOSP IP/OBS HIGH 50: CPT | Performed by: PSYCHIATRY & NEUROLOGY

## 2025-06-21 PROCEDURE — 1240000000 HC EMOTIONAL WELLNESS R&B

## 2025-06-21 PROCEDURE — 6370000000 HC RX 637 (ALT 250 FOR IP)

## 2025-06-21 RX ADMIN — DIAZEPAM 5 MG: 5 TABLET ORAL at 12:34

## 2025-06-21 RX ADMIN — PANTOPRAZOLE SODIUM 40 MG: 40 TABLET, DELAYED RELEASE ORAL at 12:34

## 2025-06-21 RX ADMIN — LEVODOPA AND CARBIDOPA 1 CAPSULE: 245; 61.25 CAPSULE, EXTENDED RELEASE ORAL at 17:07

## 2025-06-21 RX ADMIN — MICONAZOLE NITRATE: 20 POWDER TOPICAL at 21:49

## 2025-06-21 RX ADMIN — OLANZAPINE 5 MG: 5 TABLET, FILM COATED ORAL at 12:33

## 2025-06-21 RX ADMIN — CELECOXIB 200 MG: 200 CAPSULE ORAL at 12:08

## 2025-06-21 RX ADMIN — OLANZAPINE 5 MG: 5 TABLET, FILM COATED ORAL at 02:30

## 2025-06-21 RX ADMIN — Medication 1000 MCG: at 12:38

## 2025-06-21 RX ADMIN — WHITE PETROLATUM: 1.75 OINTMENT TOPICAL at 12:05

## 2025-06-21 RX ADMIN — DIVALPROEX SODIUM 500 MG: 125 CAPSULE, COATED PELLETS ORAL at 12:06

## 2025-06-21 RX ADMIN — LEVODOPA AND CARBIDOPA 1 CAPSULE: 245; 61.25 CAPSULE, EXTENDED RELEASE ORAL at 12:06

## 2025-06-21 RX ADMIN — LISINOPRIL 10 MG: 10 TABLET ORAL at 12:32

## 2025-06-21 RX ADMIN — DIAZEPAM 5 MG: 5 TABLET ORAL at 20:58

## 2025-06-21 RX ADMIN — MICONAZOLE NITRATE: 20 POWDER TOPICAL at 12:05

## 2025-06-21 RX ADMIN — WHITE PETROLATUM: 1.75 OINTMENT TOPICAL at 21:49

## 2025-06-21 RX ADMIN — ANORECTAL OINTMENT: 15.7; .44; 24; 20.6 OINTMENT TOPICAL at 12:05

## 2025-06-21 RX ADMIN — FUROSEMIDE 40 MG: 40 TABLET ORAL at 12:34

## 2025-06-21 RX ADMIN — ANORECTAL OINTMENT: 15.7; .44; 24; 20.6 OINTMENT TOPICAL at 21:48

## 2025-06-21 RX ADMIN — FUROSEMIDE 40 MG: 40 TABLET ORAL at 16:23

## 2025-06-21 RX ADMIN — TAMSULOSIN HYDROCHLORIDE 0.4 MG: 0.4 CAPSULE ORAL at 12:35

## 2025-06-21 RX ADMIN — HYDROCODONE BITARTRATE AND ACETAMINOPHEN 1 TABLET: 5; 325 TABLET ORAL at 17:05

## 2025-06-21 RX ADMIN — OLANZAPINE 5 MG: 5 TABLET, FILM COATED ORAL at 16:23

## 2025-06-21 RX ADMIN — TRAZODONE HYDROCHLORIDE 50 MG: 50 TABLET ORAL at 20:58

## 2025-06-21 RX ADMIN — LEVODOPA AND CARBIDOPA 1 CAPSULE: 245; 61.25 CAPSULE, EXTENDED RELEASE ORAL at 20:59

## 2025-06-21 ASSESSMENT — PAIN DESCRIPTION - DESCRIPTORS: DESCRIPTORS: ACHING

## 2025-06-21 ASSESSMENT — PAIN SCALES - GENERAL
PAINLEVEL_OUTOF10: 0

## 2025-06-21 ASSESSMENT — PAIN SCALES - WONG BAKER: WONGBAKER_NUMERICALRESPONSE: NO HURT

## 2025-06-21 ASSESSMENT — PAIN - FUNCTIONAL ASSESSMENT: PAIN_FUNCTIONAL_ASSESSMENT: ACTIVITIES ARE NOT PREVENTED

## 2025-06-21 ASSESSMENT — PAIN DESCRIPTION - ORIENTATION: ORIENTATION: LEFT

## 2025-06-21 ASSESSMENT — PAIN DESCRIPTION - LOCATION: LOCATION: SHOULDER

## 2025-06-22 LAB
ANION GAP SERPL CALCULATED.3IONS-SCNC: 10 MMOL/L (ref 3–16)
BASOPHILS # BLD: 0 K/UL (ref 0–0.2)
BASOPHILS NFR BLD: 0.6 %
BUN SERPL-MCNC: 21 MG/DL (ref 7–20)
CALCIUM SERPL-MCNC: 9.7 MG/DL (ref 8.3–10.6)
CHLORIDE SERPL-SCNC: 104 MMOL/L (ref 99–110)
CO2 SERPL-SCNC: 25 MMOL/L (ref 21–32)
CREAT SERPL-MCNC: 0.7 MG/DL (ref 0.8–1.3)
DEPRECATED RDW RBC AUTO: 13.6 % (ref 12.4–15.4)
EOSINOPHIL # BLD: 0.3 K/UL (ref 0–0.6)
EOSINOPHIL NFR BLD: 5.6 %
GFR SERPLBLD CREATININE-BSD FMLA CKD-EPI: >90 ML/MIN/{1.73_M2}
GLUCOSE SERPL-MCNC: 97 MG/DL (ref 70–99)
HCT VFR BLD AUTO: 41.3 % (ref 40.5–52.5)
HGB BLD-MCNC: 14.1 G/DL (ref 13.5–17.5)
LYMPHOCYTES # BLD: 1.6 K/UL (ref 1–5.1)
LYMPHOCYTES NFR BLD: 32.7 %
MCH RBC QN AUTO: 31.6 PG (ref 26–34)
MCHC RBC AUTO-ENTMCNC: 34.1 G/DL (ref 31–36)
MCV RBC AUTO: 92.9 FL (ref 80–100)
MONOCYTES # BLD: 0.5 K/UL (ref 0–1.3)
MONOCYTES NFR BLD: 10.8 %
NEUTROPHILS # BLD: 2.5 K/UL (ref 1.7–7.7)
NEUTROPHILS NFR BLD: 50.3 %
PLATELET # BLD AUTO: 123 K/UL (ref 135–450)
PMV BLD AUTO: 9.9 FL (ref 5–10.5)
POTASSIUM SERPL-SCNC: 4 MMOL/L (ref 3.5–5.1)
RBC # BLD AUTO: 4.44 M/UL (ref 4.2–5.9)
SODIUM SERPL-SCNC: 139 MMOL/L (ref 136–145)
WBC # BLD AUTO: 5 K/UL (ref 4–11)

## 2025-06-22 PROCEDURE — 1240000000 HC EMOTIONAL WELLNESS R&B

## 2025-06-22 PROCEDURE — 6370000000 HC RX 637 (ALT 250 FOR IP): Performed by: PSYCHIATRY & NEUROLOGY

## 2025-06-22 PROCEDURE — 85025 COMPLETE CBC W/AUTO DIFF WBC: CPT

## 2025-06-22 PROCEDURE — 36415 COLL VENOUS BLD VENIPUNCTURE: CPT

## 2025-06-22 PROCEDURE — 6360000002 HC RX W HCPCS: Performed by: PSYCHIATRY & NEUROLOGY

## 2025-06-22 PROCEDURE — 6370000000 HC RX 637 (ALT 250 FOR IP)

## 2025-06-22 PROCEDURE — 80048 BASIC METABOLIC PNL TOTAL CA: CPT

## 2025-06-22 RX ORDER — CETIRIZINE HYDROCHLORIDE 10 MG/1
10 TABLET ORAL NIGHTLY
Status: ON HOLD | COMMUNITY
End: 2025-06-23 | Stop reason: HOSPADM

## 2025-06-22 RX ORDER — GABAPENTIN 100 MG/1
100 CAPSULE ORAL 2 TIMES DAILY
Status: DISCONTINUED | OUTPATIENT
Start: 2025-06-22 | End: 2025-06-24 | Stop reason: HOSPADM

## 2025-06-22 RX ORDER — CETIRIZINE HYDROCHLORIDE 10 MG/1
10 TABLET ORAL NIGHTLY
Status: DISCONTINUED | OUTPATIENT
Start: 2025-06-22 | End: 2025-06-24 | Stop reason: HOSPADM

## 2025-06-22 RX ORDER — CARBIDOPA AND LEVODOPA 25; 100 MG/1; MG/1
1 TABLET ORAL 3 TIMES DAILY
Status: DISCONTINUED | OUTPATIENT
Start: 2025-06-22 | End: 2025-06-24 | Stop reason: HOSPADM

## 2025-06-22 RX ORDER — TAMSULOSIN HYDROCHLORIDE 0.4 MG/1
0.4 CAPSULE ORAL NIGHTLY
Status: DISCONTINUED | OUTPATIENT
Start: 2025-06-22 | End: 2025-06-24 | Stop reason: HOSPADM

## 2025-06-22 RX ADMIN — MICONAZOLE NITRATE: 20 POWDER TOPICAL at 15:10

## 2025-06-22 RX ADMIN — OLANZAPINE 5 MG: 5 TABLET, FILM COATED ORAL at 06:14

## 2025-06-22 RX ADMIN — LEVODOPA AND CARBIDOPA 1 CAPSULE: 245; 61.25 CAPSULE, EXTENDED RELEASE ORAL at 08:51

## 2025-06-22 RX ADMIN — DIAZEPAM 5 MG: 5 INJECTION, SOLUTION INTRAMUSCULAR; INTRAVENOUS at 20:38

## 2025-06-22 RX ADMIN — DIAZEPAM 5 MG: 5 TABLET ORAL at 08:48

## 2025-06-22 RX ADMIN — ANORECTAL OINTMENT: 15.7; .44; 24; 20.6 OINTMENT TOPICAL at 21:00

## 2025-06-22 RX ADMIN — DIVALPROEX SODIUM 500 MG: 125 CAPSULE, COATED PELLETS ORAL at 08:46

## 2025-06-22 RX ADMIN — ANORECTAL OINTMENT: 15.7; .44; 24; 20.6 OINTMENT TOPICAL at 15:11

## 2025-06-22 RX ADMIN — TAMSULOSIN HYDROCHLORIDE 0.4 MG: 0.4 CAPSULE ORAL at 08:46

## 2025-06-22 RX ADMIN — PANTOPRAZOLE SODIUM 40 MG: 40 TABLET, DELAYED RELEASE ORAL at 08:47

## 2025-06-22 RX ADMIN — Medication 1000 MCG: at 08:46

## 2025-06-22 RX ADMIN — WHITE PETROLATUM: 1.75 OINTMENT TOPICAL at 15:11

## 2025-06-22 ASSESSMENT — PAIN SCALES - GENERAL
PAINLEVEL_OUTOF10: 0
PAINLEVEL_OUTOF10: 0
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
## 2025-06-22 NOTE — BH NOTE
Pt is alert to self only. Out in day room for breakfast, ate 50%. Selective with morning medications. Refused afternoon meds in pudding. Bed bath given. 474 ml of fluids consumed. Agitated and combative with staff.

## 2025-06-23 PROCEDURE — 6370000000 HC RX 637 (ALT 250 FOR IP): Performed by: PSYCHIATRY & NEUROLOGY

## 2025-06-23 PROCEDURE — 6370000000 HC RX 637 (ALT 250 FOR IP): Performed by: NURSE PRACTITIONER

## 2025-06-23 PROCEDURE — 99233 SBSQ HOSP IP/OBS HIGH 50: CPT | Performed by: PSYCHIATRY & NEUROLOGY

## 2025-06-23 PROCEDURE — 1240000000 HC EMOTIONAL WELLNESS R&B

## 2025-06-23 PROCEDURE — 6370000000 HC RX 637 (ALT 250 FOR IP)

## 2025-06-23 RX ORDER — HYDROCODONE BITARTRATE AND ACETAMINOPHEN 5; 325 MG/1; MG/1
1 TABLET ORAL EVERY 8 HOURS PRN
Qty: 10 TABLET | Refills: 0 | Status: SHIPPED | OUTPATIENT
Start: 2025-06-23 | End: 2025-06-23

## 2025-06-23 RX ORDER — TAMSULOSIN HYDROCHLORIDE 0.4 MG/1
0.4 CAPSULE ORAL NIGHTLY
Qty: 1 CAPSULE | Refills: 0 | Status: SHIPPED | OUTPATIENT
Start: 2025-06-23 | End: 2025-06-24

## 2025-06-23 RX ORDER — CELECOXIB 100 MG/1
200 CAPSULE ORAL DAILY
Qty: 2 CAPSULE | Refills: 0 | Status: SHIPPED | OUTPATIENT
Start: 2025-06-23

## 2025-06-23 RX ORDER — DIVALPROEX SODIUM 125 MG/1
500 CAPSULE, COATED PELLETS ORAL
Qty: 60 CAPSULE | Refills: 3 | Status: SHIPPED | OUTPATIENT
Start: 2025-06-24 | End: 2025-06-24

## 2025-06-23 RX ORDER — NYSTATIN 100000 [USP'U]/G
POWDER TOPICAL EVERY 12 HOURS
Qty: 1 EACH | Refills: 0 | Status: SHIPPED | OUTPATIENT
Start: 2025-06-23

## 2025-06-23 RX ORDER — PANTOPRAZOLE SODIUM 40 MG/1
40 TABLET, DELAYED RELEASE ORAL
Qty: 2 TABLET | Refills: 0 | Status: SHIPPED | OUTPATIENT
Start: 2025-06-24 | End: 2025-06-24

## 2025-06-23 RX ORDER — CETIRIZINE HYDROCHLORIDE 10 MG/1
10 TABLET ORAL NIGHTLY
Qty: 1 TABLET | Refills: 0 | Status: SHIPPED | OUTPATIENT
Start: 2025-06-23 | End: 2025-06-24

## 2025-06-23 RX ORDER — DIAZEPAM 5 MG/1
5 TABLET ORAL 2 TIMES DAILY
Qty: 20 TABLET | Refills: 0 | Status: SHIPPED | OUTPATIENT
Start: 2025-06-23 | End: 2025-06-24

## 2025-06-23 RX ORDER — OLANZAPINE 5 MG/1
5 TABLET, FILM COATED ORAL
Qty: 2 TABLET | Refills: 0 | Status: SHIPPED | OUTPATIENT
Start: 2025-06-23 | End: 2025-06-24

## 2025-06-23 RX ORDER — GABAPENTIN 100 MG/1
100 CAPSULE ORAL 2 TIMES DAILY
Qty: 2 CAPSULE | Refills: 0 | Status: SHIPPED | OUTPATIENT
Start: 2025-06-23 | End: 2025-06-24

## 2025-06-23 RX ORDER — LIDOCAINE 4 G/G
1 PATCH TOPICAL DAILY
Qty: 1 PATCH | Refills: 0 | Status: SHIPPED | OUTPATIENT
Start: 2025-06-24 | End: 2025-06-24

## 2025-06-23 RX ORDER — CARBIDOPA AND LEVODOPA 25; 100 MG/1; MG/1
1 TABLET ORAL 3 TIMES DAILY
Qty: 2 TABLET | Refills: 0 | Status: SHIPPED | OUTPATIENT
Start: 2025-06-23 | End: 2025-06-24

## 2025-06-23 RX ORDER — RIVASTIGMINE 4.6 MG/24H
1 PATCH, EXTENDED RELEASE TRANSDERMAL DAILY
Qty: 1 PATCH | Refills: 0 | Status: SHIPPED | OUTPATIENT
Start: 2025-06-24

## 2025-06-23 RX ORDER — MINERAL OIL/HYDROPHIL PETROLAT
OINTMENT (GRAM) TOPICAL
Qty: 1 APPLICATION | Refills: 0 | Status: SHIPPED | OUTPATIENT
Start: 2025-06-23 | End: 2025-06-24

## 2025-06-23 RX ORDER — HYDROCODONE BITARTRATE AND ACETAMINOPHEN 5; 325 MG/1; MG/1
1 TABLET ORAL EVERY 8 HOURS PRN
Qty: 10 TABLET | Refills: 0 | Status: SHIPPED | OUTPATIENT
Start: 2025-06-23 | End: 2025-06-24

## 2025-06-23 RX ORDER — LISINOPRIL 10 MG/1
10 TABLET ORAL DAILY
Qty: 1 TABLET | Refills: 0 | Status: SHIPPED | OUTPATIENT
Start: 2025-06-23 | End: 2025-06-24

## 2025-06-23 RX ADMIN — CARBIDOPA AND LEVODOPA 1 TABLET: 25; 100 TABLET ORAL at 08:51

## 2025-06-23 RX ADMIN — DIAZEPAM 5 MG: 5 TABLET ORAL at 20:44

## 2025-06-23 RX ADMIN — OLANZAPINE 5 MG: 5 TABLET, FILM COATED ORAL at 06:32

## 2025-06-23 RX ADMIN — CELECOXIB 200 MG: 200 CAPSULE ORAL at 08:51

## 2025-06-23 RX ADMIN — GABAPENTIN 100 MG: 100 CAPSULE ORAL at 20:44

## 2025-06-23 RX ADMIN — ANORECTAL OINTMENT: 15.7; .44; 24; 20.6 OINTMENT TOPICAL at 10:15

## 2025-06-23 RX ADMIN — OLANZAPINE 5 MG: 5 TABLET, FILM COATED ORAL at 16:13

## 2025-06-23 RX ADMIN — LISINOPRIL 10 MG: 10 TABLET ORAL at 08:51

## 2025-06-23 RX ADMIN — TAMSULOSIN HYDROCHLORIDE 0.4 MG: 0.4 CAPSULE ORAL at 20:44

## 2025-06-23 RX ADMIN — TRAZODONE HYDROCHLORIDE 50 MG: 50 TABLET ORAL at 20:44

## 2025-06-23 RX ADMIN — WHITE PETROLATUM: 1.75 OINTMENT TOPICAL at 10:15

## 2025-06-23 RX ADMIN — CETIRIZINE HYDROCHLORIDE 10 MG: 10 TABLET ORAL at 20:44

## 2025-06-23 RX ADMIN — POLYETHYLENE GLYCOL (3350) 17 G: 17 POWDER, FOR SOLUTION ORAL at 09:16

## 2025-06-23 RX ADMIN — PANTOPRAZOLE SODIUM 40 MG: 40 TABLET, DELAYED RELEASE ORAL at 06:33

## 2025-06-23 RX ADMIN — CARBIDOPA AND LEVODOPA 1 TABLET: 25; 100 TABLET ORAL at 16:13

## 2025-06-23 RX ADMIN — HYDROCODONE BITARTRATE AND ACETAMINOPHEN 1 TABLET: 5; 325 TABLET ORAL at 08:51

## 2025-06-23 RX ADMIN — DIVALPROEX SODIUM 500 MG: 125 CAPSULE, COATED PELLETS ORAL at 08:51

## 2025-06-23 RX ADMIN — MICONAZOLE NITRATE: 20 POWDER TOPICAL at 10:14

## 2025-06-23 RX ADMIN — Medication 1000 MCG: at 08:51

## 2025-06-23 RX ADMIN — DIAZEPAM 5 MG: 5 TABLET ORAL at 08:51

## 2025-06-23 RX ADMIN — CARBIDOPA AND LEVODOPA 1 TABLET: 25; 100 TABLET ORAL at 20:44

## 2025-06-23 RX ADMIN — GABAPENTIN 100 MG: 100 CAPSULE ORAL at 08:51

## 2025-06-23 ASSESSMENT — PAIN DESCRIPTION - LOCATION: LOCATION: BACK

## 2025-06-23 ASSESSMENT — PAIN SCALES - WONG BAKER: WONGBAKER_NUMERICALRESPONSE: NO HURT

## 2025-06-23 ASSESSMENT — PAIN DESCRIPTION - ORIENTATION: ORIENTATION: MID;LOWER

## 2025-06-23 ASSESSMENT — PAIN DESCRIPTION - DESCRIPTORS: DESCRIPTORS: ACHING

## 2025-06-23 ASSESSMENT — PAIN - FUNCTIONAL ASSESSMENT: PAIN_FUNCTIONAL_ASSESSMENT: PREVENTS OR INTERFERES SOME ACTIVE ACTIVITIES AND ADLS

## 2025-06-23 ASSESSMENT — PAIN SCALES - GENERAL
PAINLEVEL_OUTOF10: 0
PAINLEVEL_OUTOF10: 7

## 2025-06-23 NOTE — GROUP NOTE
Group Therapy Note    Date: 6/23/2025    Group Start Time: 1330  Group End Time: 1430  Group Topic: Activity    [facility] [place] Mariela Jenkins LSW        Group Therapy Note  Patient was invited to group but unable to attend.    Attendees: 6       Signature:  ENE Knowles

## 2025-06-23 NOTE — GROUP NOTE
Group Therapy Note    Date: 6/23/2025    Group Start Time: 1115  Group End Time: 1145  Group Topic: Cognitive Skills    [facility] [place] [facility]    Elana Pang LPC    Patient was invited to group but unable to attend.      Group Therapy Note           Signature:  Elana Pang LPC

## 2025-06-23 NOTE — DISCHARGE INSTR - COC
Nurse Assessment:  Last Vital Signs: /83   Pulse 82   Temp 97.3 °F (36.3 °C) (Temporal)   Resp 16   Ht 1.803 m (5' 11\")   Wt 111.1 kg (244 lb 14.9 oz)   SpO2 99%   BMI 34.16 kg/m²     Last documented pain score (0-10 scale): Pain Level: 7  Last Weight:   Wt Readings from Last 1 Encounters:   06/11/25 111.1 kg (244 lb 14.9 oz)     Mental Status:  disoriented    IV Access:  - None    Nursing Mobility/ADLs:  Walking   Dependent  Transfer  Dependent  Bathing  Dependent  Dressing  Dependent  Toileting  Dependent  Feeding  Independent With set-up  Med Admin  Assisted  Med Delivery   crushed and in pudding    Wound Care Documentation and Therapy:  Incision 09/06/18 Arm Right (Active)   Number of days: 2482        Elimination:  Continence:   Bowel: No  Bladder: No  Urinary Catheter: None   Colostomy/Ileostomy/Ileal Conduit: No       Date of Last BM: unknown  No intake or output data in the 24 hours ending 06/23/25 1352  No intake/output data recorded.    Safety Concerns:     None    Impairments/Disabilities:      Speech, Vision, and Hearing    Nutrition Therapy:  Current Nutrition Therapy:   - Oral Diet:  General    Routes of Feeding: Oral  Liquids: No Restrictions  Daily Fluid Restriction: no  Last Modified Barium Swallow with Video (Video Swallowing Test): not done    Treatments at the Time of Hospital Discharge:   Respiratory Treatments: N/A  Oxygen Therapy:  is not on home oxygen therapy.  Ventilator:    - No ventilator support    Rehab Therapies: N/A  Weight Bearing Status/Restrictions: No weight bearing restrictions  Other Medical Equipment (for information only, NOT a DME order):  wheelchair  Other Treatments: N/A    Patient's personal belongings (please select all that are sent with patient):  Glasses    RN SIGNATURE:  Electronically signed by Desi Roche RN on 6/24/25 at 10:55 AM EDT    CASE MANAGEMENT/SOCIAL WORK SECTION    Inpatient Status Date: 06/10/2025    Readmission Risk Assessment

## 2025-06-23 NOTE — TRANSITION OF CARE
have a history of substance/alcohol abuse and requires a referral for treatment? No  Patient referred to the following for substance/alcohol abuse treatment with an appointment? No  Patient was offered medication to assist with substance/alcohol abuse cessation at discharge? No      Patient discharged to: Inpatient facility; 24-hour/7-day contact information (including physician for emergencies related to inpatient stay), contact information for pending studies, plan for follow-up care, and primary physician, or other healthcare professional, or site designated for follow up care discussed with receiving inpatient facility. and Transition record discussed with patient/caregiver and provided this record in hard copy or electronically

## 2025-06-23 NOTE — BH NOTE
Bedside Mobility Assessment Tool (BMAT):     Assessment Level 1- Sit and Shake    1. From a semi-reclined position, ask patient to sit up and rotate to a seated position at the side of the bed. Can use the bedrail.    2. Ask patient to reach out and grab your hand and shake making sure patient reaches across his/her midline.   Fail- Patient is unable to perform tasks, patient is MOBILITY LEVEL 1.    Assessment Level 2- Stretch and Point   1. With patient in seated position at the side of the bed, have patient place both feet on the floor (or stool) with knees no higher than hips.    2. Ask patient to stretch one leg and straighten the knee, then bend the ankle/flex and point the toes. If appropriate, repeat with the other leg.   Fail- Patient is unable to complete task. Patient is MOBILITY LEVEL 2.     Assessment Level 3- Stand   1. Ask patient to elevate off the bed or chair (seated to standing) using an assistive device (cane, bedrail).    2. Patient should be able to raise buttocks off be and hold for a count of five. May repeat once.   Fail- Patient unable to demonstrate standing stability. Patient is MOBILITY LEVEL 3.     Assessment Level 4- Walk   1. Ask patient to march in place at bedside.    2. Then ask patient to advance step and return each foot. Some medical conditions may render a patient from stepping backwards, use your best clinical judgement.   Fail- Patient not able to complete tasks OR requires use of assistive device. Patient is MOBILITY LEVEL 3.       Mobility Level- 3

## 2025-06-23 NOTE — BH NOTE
2038 Valium 5 mg given IM due to patient refusing meds To help keep agitation down. Patient tried to grab needle out of nurses hand but did get it back and patient went back to sleep.

## 2025-06-23 NOTE — CARE COORDINATION
Spoke with Judith with [facility] and pt's wife. [facility] has accepted pt for services. They will coordinate his DC back to [facility] for tomorrow.

## 2025-06-24 ENCOUNTER — FOLLOWUP TELEPHONE ENCOUNTER (OUTPATIENT)
Dept: PSYCHIATRY | Age: 75
End: 2025-06-24

## 2025-06-24 VITALS
OXYGEN SATURATION: 100 % | DIASTOLIC BLOOD PRESSURE: 70 MMHG | WEIGHT: 244.93 LBS | SYSTOLIC BLOOD PRESSURE: 96 MMHG | HEIGHT: 71 IN | TEMPERATURE: 97.8 F | BODY MASS INDEX: 34.29 KG/M2 | HEART RATE: 100 BPM | RESPIRATION RATE: 18 BRPM

## 2025-06-24 PROCEDURE — 6370000000 HC RX 637 (ALT 250 FOR IP): Performed by: PSYCHIATRY & NEUROLOGY

## 2025-06-24 PROCEDURE — 99239 HOSP IP/OBS DSCHRG MGMT >30: CPT | Performed by: PSYCHIATRY & NEUROLOGY

## 2025-06-24 PROCEDURE — 5130000000 HC BRIDGE APPOINTMENT

## 2025-06-24 PROCEDURE — 6370000000 HC RX 637 (ALT 250 FOR IP): Performed by: NURSE PRACTITIONER

## 2025-06-24 PROCEDURE — 6370000000 HC RX 637 (ALT 250 FOR IP)

## 2025-06-24 RX ORDER — PANTOPRAZOLE SODIUM 40 MG/1
40 TABLET, DELAYED RELEASE ORAL
Qty: 30 TABLET | Refills: 0 | Status: SHIPPED | OUTPATIENT
Start: 2025-06-24

## 2025-06-24 RX ORDER — HYDROCODONE BITARTRATE AND ACETAMINOPHEN 5; 325 MG/1; MG/1
1 TABLET ORAL EVERY 8 HOURS PRN
Qty: 10 TABLET | Refills: 0 | Status: SHIPPED | OUTPATIENT
Start: 2025-06-24 | End: 2025-06-24

## 2025-06-24 RX ORDER — TAMSULOSIN HYDROCHLORIDE 0.4 MG/1
0.4 CAPSULE ORAL NIGHTLY
Qty: 30 CAPSULE | Refills: 0 | Status: SHIPPED | OUTPATIENT
Start: 2025-06-24

## 2025-06-24 RX ORDER — HYDROCODONE BITARTRATE AND ACETAMINOPHEN 5; 325 MG/1; MG/1
1 TABLET ORAL EVERY 8 HOURS PRN
Qty: 21 TABLET | Refills: 0 | Status: SHIPPED | OUTPATIENT
Start: 2025-06-24 | End: 2025-06-24

## 2025-06-24 RX ORDER — DIVALPROEX SODIUM 125 MG/1
500 CAPSULE, COATED PELLETS ORAL
Qty: 60 CAPSULE | Refills: 0 | Status: SHIPPED | OUTPATIENT
Start: 2025-06-24

## 2025-06-24 RX ORDER — OLANZAPINE 5 MG/1
5 TABLET, FILM COATED ORAL
Qty: 60 TABLET | Refills: 0 | Status: SHIPPED | OUTPATIENT
Start: 2025-06-24

## 2025-06-24 RX ORDER — MINERAL OIL/HYDROPHIL PETROLAT
OINTMENT (GRAM) TOPICAL
Qty: 1 APPLICATION | Refills: 0 | Status: SHIPPED | OUTPATIENT
Start: 2025-06-24

## 2025-06-24 RX ORDER — GABAPENTIN 100 MG/1
100 CAPSULE ORAL 2 TIMES DAILY
Qty: 60 CAPSULE | Refills: 0 | Status: SHIPPED | OUTPATIENT
Start: 2025-06-24 | End: 2025-06-25

## 2025-06-24 RX ORDER — LIDOCAINE 4 G/G
1 PATCH TOPICAL DAILY
Qty: 30 PATCH | Refills: 0 | Status: SHIPPED | OUTPATIENT
Start: 2025-06-24

## 2025-06-24 RX ORDER — HYDROCODONE BITARTRATE AND ACETAMINOPHEN 5; 325 MG/1; MG/1
1 TABLET ORAL EVERY 8 HOURS PRN
Qty: 21 TABLET | Refills: 0 | Status: SHIPPED | OUTPATIENT
Start: 2025-06-24 | End: 2025-07-01

## 2025-06-24 RX ORDER — DIAZEPAM 5 MG/1
5 TABLET ORAL 2 TIMES DAILY
Qty: 20 TABLET | Refills: 0 | Status: SHIPPED | OUTPATIENT
Start: 2025-06-24 | End: 2025-07-04

## 2025-06-24 RX ORDER — CETIRIZINE HYDROCHLORIDE 10 MG/1
10 TABLET ORAL NIGHTLY
Qty: 30 TABLET | Refills: 0 | Status: SHIPPED | OUTPATIENT
Start: 2025-06-24

## 2025-06-24 RX ORDER — CARBIDOPA AND LEVODOPA 25; 100 MG/1; MG/1
1 TABLET ORAL 3 TIMES DAILY
Qty: 90 TABLET | Refills: 0 | Status: SHIPPED | OUTPATIENT
Start: 2025-06-24

## 2025-06-24 RX ORDER — LISINOPRIL 10 MG/1
10 TABLET ORAL DAILY
Qty: 30 TABLET | Refills: 0 | Status: SHIPPED | OUTPATIENT
Start: 2025-06-24

## 2025-06-24 RX ADMIN — WHITE PETROLATUM: 1.75 OINTMENT TOPICAL at 08:28

## 2025-06-24 RX ADMIN — GABAPENTIN 100 MG: 100 CAPSULE ORAL at 08:23

## 2025-06-24 RX ADMIN — OLANZAPINE 5 MG: 5 TABLET, FILM COATED ORAL at 06:59

## 2025-06-24 RX ADMIN — PANTOPRAZOLE SODIUM 40 MG: 40 TABLET, DELAYED RELEASE ORAL at 06:59

## 2025-06-24 RX ADMIN — ANORECTAL OINTMENT: 15.7; .44; 24; 20.6 OINTMENT TOPICAL at 08:29

## 2025-06-24 RX ADMIN — DIVALPROEX SODIUM 500 MG: 125 CAPSULE, COATED PELLETS ORAL at 08:23

## 2025-06-24 RX ADMIN — CELECOXIB 200 MG: 200 CAPSULE ORAL at 08:23

## 2025-06-24 RX ADMIN — DIAZEPAM 5 MG: 5 TABLET ORAL at 08:23

## 2025-06-24 RX ADMIN — CARBIDOPA AND LEVODOPA 1 TABLET: 25; 100 TABLET ORAL at 08:23

## 2025-06-24 RX ADMIN — MICONAZOLE NITRATE: 20 POWDER TOPICAL at 08:28

## 2025-06-24 ASSESSMENT — PAIN SCALES - GENERAL: PAINLEVEL_OUTOF10: 0

## 2025-06-24 NOTE — BH NOTE
[facility]  Discharge Note    Pt discharged with followings belongings:   Dental Appliances: None  Vision - Corrective Lenses: None  Hearing Aid: None  Jewelry: None  Body Piercings Removed: N/A  Clothing: Shirt, Pants, At bedside (3 shirts (red, yellow, pink), 2 pants w/ strings removed placed in pt room.)  Other Valuables: At home   Valuables sent home with patient or returned to patient. Patient educated on aftercare instructions: [facility]  [facility] by Denise  at 12:15 PM .Patient verbalize understanding of AVS:  no.    Status EXAM upon discharge:  Mental Status and Behavioral Exam  Normal: No  Level of Assistance: Total assist  Facial Expression: Expressionless  Affect: Stable  Level of Consciousness: Lethargic  Frequency of Checks: 1 staff: 1 patient  - continuous  Mood:Normal: No  Mood: Other (comment) (Sleeping)  Motor Activity:Normal: No  Motor Activity: Decreased, Tremors  Eye Contact: Poor  Observed Behavior: Other (comment) (Sleeping)  Sexual Misconduct History: Current - no  Involved In Any Sexual Misconduct With Others? : No  History of Sexually Inappropriate Behavior When Previously Hospitalized?: Unable to access  Uncontrollable/Compulsive Masturbation?: No  Difficulty Controlling Sexual Impulses?: No  Preception: Counselor to person  Attention:Normal: No  Attention: Unable to concentrate  Thought Processes: Other (comment) (Sleeping)  Thought Content:Normal: No  Thought Content: Other (comment) (Sleeping)  Depression Symptoms: Change in energy level, Appetite change, Sleep disturbance, Isolative, Loss of interest  Anxiety Symptoms: Generalized  Deann Symptoms: No problems reported or observed.  Hallucinations: Unable to assess  Delusions: No  Memory:Normal: No  Memory: Poor recent, Poor remote  Insight and Judgment: No  Insight and Judgment: Poor judgment, Poor insight    Tobacco Screening:  Practical Counseling, on admission, sarah X, if applicable

## 2025-06-24 NOTE — PLAN OF CARE
Problem: Behavior  Goal: Pt/Family maintain appropriate behavior and adhere to behavioral management agreement, if implemented  Description: INTERVENTIONS:1. Assess patient/family's coping skills and  non-compliant behavior (including use of illegal substances)2. Notify security of behavior or suspected illegal substances which indicate the need for search of the family and/or belongings3. Encourage verbalization of thoughts and concerns in a socially appropriate manner4. Utilize positive, consistent limit setting strategies supporting safety of patient, staff and others5. Encourage participation in the decision making process about the behavioral management agreement6. If a visitor's behavior poses a threat to safety call refer to organization policy.7. Initiate consult with , Psychosocial CNS, Spiritual Care as appropriate  6/20/2025 1113 by Rahul Jordan, RN  Outcome: Betsy Arriaza was upset at the beginning of the shift.  This nurse introduced himself and the sitter to Arsalan.  This seemed to help Arsalan settle down.  Arsalan rested for awhile.    At around 0815, Arsalan urinated out of his bed and onto the floor a large amount of urine.  This nurse cleaned the floor and the bed while Arsalan slept.    At around 0850, Arsalan communicated to his sitter that he had a headache.  This nurse talked with Arsalan about his pain and the idea that he would take medication.  Arsalan agreed to take medication and he took all of his 9am medications except the B12, Flomax, Protonix, and the creams, powders for his skin.  Arsalan took the Depakote sprinkles in pudding and the rest he took whole in pudding.  Arsalan  was cooperative with the application of the patches.   Arsalan has been able to calm and he is currently resting in bed.   
  Problem: Behavior  Goal: Pt/Family maintain appropriate behavior and adhere to behavioral management agreement, if implemented  6/19/2025 0153 by Martin Miguel, RN  Outcome: Not Progressing  Flowsheets (Taken 6/19/2025 0153)  Patient/family maintains appropriate behavior and adheres to behavioral management agreement, if implemented:   Assess patient/family’s coping skills and  non-compliant behavior (including use of illegal substances)   Encourage participation in the decision making process about the behavioral management agreement   Utilize positive, consistent limit setting strategies supporting safety of patient, staff and others   Encourage verbalization of thoughts and concerns in a socially appropriate manner  6/18/2025 1348 by Elena Bhagat, RN  Outcome: Progressing     Problem: Confusion  Goal: Confusion, delirium, dementia, or psychosis is improved or at baseline  6/19/2025 0153 by Martin Miguel, RN  Outcome: Not Progressing  Flowsheets (Taken 6/19/2025 0153)  Effect of thought disturbance (confusion, delirium, dementia, or psychosis) are managed with adequate functional status:   Assess for contributors to thought disturbance, including medications, impaired vision or hearing, underlying metabolic abnormalities, dehydration, psychiatric diagnoses, notify LIP   [place] high risk fall precautions, as indicated   Provide frequent short contacts to provide reality reorientation, refocusing and direction   Decrease environmental stimuli, including noise as appropriate  6/18/2025 1348 by Elena Bhagat, RN  Outcome: Not Progressing     
  Problem: Behavior  Goal: Pt/Family maintain appropriate behavior and adhere to behavioral management agreement, if implemented  Description: INTERVENTIONS:1. Assess patient/family's coping skills and  non-compliant behavior (including use of illegal substances)2. Notify security of behavior or suspected illegal substances which indicate the need for search of the family and/or belongings3. Encourage verbalization of thoughts and concerns in a socially appropriate manner4. Utilize positive, consistent limit setting strategies supporting safety of patient, staff and others5. Encourage participation in the decision making process about the behavioral management agreement6. If a visitor's behavior poses a threat to safety call refer to organization policy.7. Initiate consult with , Psychosocial CNS, Spiritual Care as appropriate  6/14/2025 2230 by Rosa Best LPN  Outcome: Not Progressing  6/14/2025 2230 by Rosa Best LPN  Outcome: Progressing     Problem: Anxiety  Goal: Will report anxiety at manageable levels  Description: INTERVENTIONS:1. Administer medication as ordered2. Teach and rehearse alternative coping skills3. Provide emotional support with 1:1 interaction with staff  6/14/2025 2230 by Rosa Best LPN  Outcome: Not Progressing     Problem: Depression/Self Harm  Goal: Effect of psychiatric condition will be minimized and patient will be protected from self harm  Description: INTERVENTIONS:1. Assess impact of patient's symptoms on level of functioning, self care needs and offer support as indicated2. Assess patient/family knowledge of depression, impact on illness and need for teaching3. Provide emotional support, presence and reassurance4. Assess for possible suicidal thoughts or ideation. If patient expresses suicidal thoughts or statements do not leave alone, initiate Suicide Precautions, move to a room close to the nursing station and obtain sitter5. Initiate consults as 
  Problem: Behavior  Goal: Pt/Family maintain appropriate behavior and adhere to behavioral management agreement, if implemented  Description: INTERVENTIONS:1. Assess patient/family's coping skills and  non-compliant behavior (including use of illegal substances)2. Notify security of behavior or suspected illegal substances which indicate the need for search of the family and/or belongings3. Encourage verbalization of thoughts and concerns in a socially appropriate manner4. Utilize positive, consistent limit setting strategies supporting safety of patient, staff and others5. Encourage participation in the decision making process about the behavioral management agreement6. If a visitor's behavior poses a threat to safety call refer to organization policy.7. Initiate consult with , Psychosocial CNS, Spiritual Care as appropriate  6/15/2025 1206 by Carrei Morales, RN  Outcome: Not Progressing  Flowsheets (Taken 6/14/2025 1234)  Patient/family maintains appropriate behavior and adheres to behavioral management agreement, if implemented:   Encourage verbalization of thoughts and concerns in a socially appropriate manner   Utilize positive, consistent limit setting strategies supporting safety of patient, staff and others   Encourage participation in the decision making process about the behavioral management agreement  Note: Patient is inappropriate with his behavior. Abusive to staff by hitting, kicking, pinching, spitting and slapping. Redirection without effect.  6/14/2025 2230 by Rosa Best LPN  Outcome: Not Progressing  6/14/2025 2230 by Rosa Best LPN  Outcome: Progressing     Problem: Skin/Tissue Integrity  Goal: Skin integrity remains intact  Description: 1.  Monitor for areas of redness and/or skin breakdown2.  Assess vascular access sites hourly3.  Every 4-6 hours minimum:  Change oxygen saturation probe site4.  Every 4-6 hours:  If on nasal continuous positive airway 
  Problem: Behavior  Goal: Pt/Family maintain appropriate behavior and adhere to behavioral management agreement, if implemented  Description: INTERVENTIONS:1. Assess patient/family's coping skills and  non-compliant behavior (including use of illegal substances)2. Notify security of behavior or suspected illegal substances which indicate the need for search of the family and/or belongings3. Encourage verbalization of thoughts and concerns in a socially appropriate manner4. Utilize positive, consistent limit setting strategies supporting safety of patient, staff and others5. Encourage participation in the decision making process about the behavioral management agreement6. If a visitor's behavior poses a threat to safety call refer to organization policy.7. Initiate consult with , Psychosocial CNS, Spiritual Care as appropriate  6/17/2025 2236 by Tia Burt RN  Outcome: Progressing  6/17/2025 1653 by Elana Roca RN  Outcome: Not Progressing     Problem: Confusion  Goal: Confusion, delirium, dementia, or psychosis is improved or at baseline  Description: INTERVENTIONS:1. Assess for possible contributors to thought disturbance, including medications, impaired vision or hearing, underlying metabolic abnormalities, dehydration, psychiatric diagnoses, and notify attending LIP2. [place] high risk fall precautions, as indicated3. Provide frequent short contacts to provide reality reorientation, refocusing and direction4. Decrease environmental stimuli, including noise as appropriate5. Monitor and intervene to maintain adequate nutrition, hydration, elimination, sleep and activity6. If unable to ensure safety without constant attention obtain sitter and review sitter guidelines with assigned personnel7. Initiate Psychosocial CNS and Spiritual Care consult, as indicated  6/17/2025 2236 by Tia Burt RN  Outcome: Not Progressing  6/17/2025 1653 by Elana Roca RN  Outcome: Not Progressing   
  Problem: Behavior  Goal: Pt/Family maintain appropriate behavior and adhere to behavioral management agreement, if implemented  Description: INTERVENTIONS:1. Assess patient/family's coping skills and  non-compliant behavior (including use of illegal substances)2. Notify security of behavior or suspected illegal substances which indicate the need for search of the family and/or belongings3. Encourage verbalization of thoughts and concerns in a socially appropriate manner4. Utilize positive, consistent limit setting strategies supporting safety of patient, staff and others5. Encourage participation in the decision making process about the behavioral management agreement6. If a visitor's behavior poses a threat to safety call refer to organization policy.7. Initiate consult with , Psychosocial CNS, Spiritual Care as appropriate  Outcome: Progressing   Arsalan stayed in bed all morning.  He refused all of his morning medications.  Behaviors were manageable this morning.  Arsalan refused breakfast and lunch.  When Arsalan's wife came to visit this afternoon, Arsalan did get up, ate his lunch and later ate all of his dinner.  Arsalan also took all of his afternoon medications including the zyprexa.  Arsalan got dressed and came out to the table for dinner, in his wheelchair.   While out in the day room, Arsalan took all of his afternoon and evening medications whole in pudding.  After Arsalan's wife left the visit, Arsalan remained in the day room until the end of the shift.    
  Problem: Behavior  Goal: Pt/Family maintain appropriate behavior and adhere to behavioral management agreement, if implemented  Description: INTERVENTIONS:1. Assess patient/family's coping skills and  non-compliant behavior (including use of illegal substances)2. Notify security of behavior or suspected illegal substances which indicate the need for search of the family and/or belongings3. Encourage verbalization of thoughts and concerns in a socially appropriate manner4. Utilize positive, consistent limit setting strategies supporting safety of patient, staff and others5. Encourage participation in the decision making process about the behavioral management agreement6. If a visitor's behavior poses a threat to safety call refer to organization policy.7. Initiate consult with , Psychosocial CNS, Spiritual Care as appropriate  Outcome: Progressing   Arsalan was in bed until noon and rested well this morning.  At about 1215 Arsalan got up and the sat in the wheelchair so that his bed could be changed to a medical bed.  Maintenance removed the platform bed and the room was cleaned by housekeeping prior to moving the medical bed into the room.   Arsalan did not eat well for breakfast or lunch but when he took his medication, Arsalan ate 3 servings of pudding.  At dinner, Arsalan ate well also, eating 100% of his meal and ordering a second serving.    Arsalan took part of his medication.  The patches were administered and left in place. Arsalan took the carbidopa-levidopa, the celebrex, and norco.  Medications were administered whole in pudding.  When a medication was added to the medication that Arsalan was willing to take, such as the valium, Arsalan spit out the valium, but swallowed the medication that he wanted to take.  He did the same thing with his zyprexa.    Arsalan did not display any hitting behavior this shift.  Currently, Arsalan continues to sit up in the wheelchair, in the day room, 
  Problem: Safety - Adult  Goal: Free from fall injury  6/11/2025 1838 by Danielle López RN  Outcome: Not Progressing     Problem: Behavior  Goal: Pt/Family maintain appropriate behavior and adhere to behavioral management agreement, if implemented  6/12/2025 0225 by Martin Miguel RN  Outcome: Not Progressing  Flowsheets (Taken 6/12/2025 0225)  Patient/family maintains appropriate behavior and adheres to behavioral management agreement, if implemented:   Assess patient/family’s coping skills and  non-compliant behavior (including use of illegal substances)   Encourage verbalization of thoughts and concerns in a socially appropriate manner   Encourage participation in the decision making process about the behavioral management agreement   Utilize positive, consistent limit setting strategies supporting safety of patient, staff and others   Notify security of behavior or suspected illegal substances which indicate the need for search of the patient and/or belongings   Implement a Health Care Agreement if patient meets criteria  6/11/2025 1839 by Danielle López RN  Outcome: Not Progressing  6/11/2025 1838 by Danielle López RN  Outcome: Not Progressing     Problem: Skin/Tissue Integrity  Goal: Skin integrity remains intact  6/11/2025 1839 by Danielle López RN  Outcome: Not Progressing  6/11/2025 1838 by Danielle López RN  Outcome: Not Progressing     Problem: Anxiety  Goal: Will report anxiety at manageable levels  Outcome: Not Progressing  Flowsheets (Taken 6/12/2025 0225)  Will report anxiety at manageable levels:   Administer medication as ordered   Provide emotional support with 1:1 interaction with staff   Teach and rehearse alternative coping skills     Problem: Confusion  Goal: Confusion, delirium, dementia, or psychosis is improved or at baseline  6/12/2025 0225 by Martin Miguel, RN  Outcome: Not Progressing  Flowsheets (Taken 6/12/2025 0225)  Effect of thought disturbance (confusion, delirium, dementia, or 
  Problem: Safety - Adult  Goal: Free from fall injury  6/14/2025 2230 by Rosa Best LPN  Outcome: Progressing  6/14/2025 2230 by Rosa Best LPN  Outcome: Progressing  6/14/2025 1234 by Carrie Morales RN  Outcome: Not Progressing  Flowsheets (Taken 6/14/2025 1234)  Free From Fall Injury: Instruct family/caregiver on patient safety  Note: Fall precautions intact for patient. Safety sitter at side. Patient refuses to wear gripper socks. Patient will be receptive to safety precautions.     Problem: Behavior  Goal: Pt/Family maintain appropriate behavior and adhere to behavioral management agreement, if implemented  Description: INTERVENTIONS:1. Assess patient/family's coping skills and  non-compliant behavior (including use of illegal substances)2. Notify security of behavior or suspected illegal substances which indicate the need for search of the family and/or belongings3. Encourage verbalization of thoughts and concerns in a socially appropriate manner4. Utilize positive, consistent limit setting strategies supporting safety of patient, staff and others5. Encourage participation in the decision making process about the behavioral management agreement6. If a visitor's behavior poses a threat to safety call refer to organization policy.7. Initiate consult with , Psychosocial CNS, Spiritual Care as appropriate  6/14/2025 2230 by Rosa Best LPN  Outcome: Not Progressing  6/14/2025 2230 by Rosa Best LPN  Outcome: Progressing  6/14/2025 1234 by Carrie Morales RN  Outcome: Not Progressing  Flowsheets  Taken 6/14/2025 1234 by Carrie Morales RN  Patient/family maintains appropriate behavior and adheres to behavioral management agreement, if implemented:   Encourage verbalization of thoughts and concerns in a socially appropriate manner   Utilize positive, consistent limit setting strategies supporting safety of patient, staff and 
  Problem: Safety - Adult  Goal: Free from fall injury  6/14/2025 2230 by Rosa Best LPN  Outcome: Progressing  6/14/2025 2230 by Rosa Best LPN  Outcome: Progressing  6/14/2025 1234 by Carrie Morales RN  Outcome: Not Progressing  Flowsheets (Taken 6/14/2025 1234)  Free From Fall Injury: Instruct family/caregiver on patient safety  Note: Fall precautions intact for patient. Safety sitter at side. Patient refuses to wear gripper socks. Patient will be receptive to safety precautions.     Problem: Behavior  Goal: Pt/Family maintain appropriate behavior and adhere to behavioral management agreement, if implemented  Description: INTERVENTIONS:1. Assess patient/family's coping skills and  non-compliant behavior (including use of illegal substances)2. Notify security of behavior or suspected illegal substances which indicate the need for search of the family and/or belongings3. Encourage verbalization of thoughts and concerns in a socially appropriate manner4. Utilize positive, consistent limit setting strategies supporting safety of patient, staff and others5. Encourage participation in the decision making process about the behavioral management agreement6. If a visitor's behavior poses a threat to safety call refer to organization policy.7. Initiate consult with , Psychosocial CNS, Spiritual Care as appropriate  6/14/2025 2230 by Rosa Best LPN  Outcome: Not Progressing  6/14/2025 2230 by Rosa Best LPN  Outcome: Progressing  6/14/2025 1234 by Carrie Morales RN  Outcome: Not Progressing  Flowsheets  Taken 6/14/2025 1234 by Carrie Morales RN  Patient/family maintains appropriate behavior and adheres to behavioral management agreement, if implemented:   Encourage verbalization of thoughts and concerns in a socially appropriate manner   Utilize positive, consistent limit setting strategies supporting safety of patient, staff and 
  Problem: Safety - Adult  Goal: Free from fall injury  6/22/2025 2032 by Antoinette Schultz LPN  Outcome: Progressing     Problem: Behavior  Goal: Pt/Family maintain appropriate behavior and adhere to behavioral management agreement, if implemented  Description: INTERVENTIONS:1. Assess patient/family's coping skills and  non-compliant behavior (including use of illegal substances)2. Notify security of behavior or suspected illegal substances which indicate the need for search of the family and/or belongings3. Encourage verbalization of thoughts and concerns in a socially appropriate manner4. Utilize positive, consistent limit setting strategies supporting safety of patient, staff and others5. Encourage participation in the decision making process about the behavioral management agreement6. If a visitor's behavior poses a threat to safety call refer to organization policy.7. Initiate consult with , Psychosocial CNS, Spiritual Care as appropriate  6/22/2025 2032 by Antoinette Schultz LPN  Outcome: Progressing  Flowsheets (Taken 6/22/2025 2025)  Patient/family maintains appropriate behavior and adheres to behavioral management agreement, if implemented: Assess patient/family’s coping skills and  non-compliant behavior (including use of illegal substances)     Problem: Skin/Tissue Integrity  Goal: Skin integrity remains intact  Description: 1.  Monitor for areas of redness and/or skin breakdown2.  Assess vascular access sites hourly3.  Every 4-6 hours minimum:  Change oxygen saturation probe site4.  Every 4-6 hours:  If on nasal continuous positive airway pressure, respiratory therapy assess nares and determine need for appliance change or resting period  Outcome: Progressing  Flowsheets (Taken 6/22/2025 2030)  Skin Integrity Remains Intact: Monitor for areas of redness and/or skin breakdown     Problem: Pain  Goal: Verbalizes/displays adequate comfort level or baseline comfort level  Outcome: 
  Problem: Safety - Adult  Goal: Free from fall injury  Outcome: Not Progressing     Problem: Behavior  Goal: Pt/Family maintain appropriate behavior and adhere to behavioral management agreement, if implemented  Description: INTERVENTIONS:1. Assess patient/family's coping skills and  non-compliant behavior (including use of illegal substances)2. Notify security of behavior or suspected illegal substances which indicate the need for search of the family and/or belongings3. Encourage verbalization of thoughts and concerns in a socially appropriate manner4. Utilize positive, consistent limit setting strategies supporting safety of patient, staff and others5. Encourage participation in the decision making process about the behavioral management agreement6. If a visitor's behavior poses a threat to safety call refer to organization policy.7. Initiate consult with , Psychosocial CNS, Spiritual Care as appropriate  6/11/2025 1839 by Danielle López RN  Outcome: Not Progressing  6/11/2025 1838 by Danielle López RN  Outcome: Not Progressing     Problem: Skin/Tissue Integrity  Goal: Skin integrity remains intact  Description: 1.  Monitor for areas of redness and/or skin breakdown2.  Assess vascular access sites hourly3.  Every 4-6 hours minimum:  Change oxygen saturation probe site4.  Every 4-6 hours:  If on nasal continuous positive airway pressure, respiratory therapy assess nares and determine need for appliance change or resting period  6/11/2025 1839 by Danielle López RN  Outcome: Not Progressing  6/11/2025 1838 by Danielle López RN  Outcome: Not Progressing     Problem: Confusion  Goal: Confusion, delirium, dementia, or psychosis is improved or at baseline  Description: INTERVENTIONS:1. Assess for possible contributors to thought disturbance, including medications, impaired vision or hearing, underlying metabolic abnormalities, dehydration, psychiatric diagnoses, and notify attending LIP2. [place] 
  Problem: Safety - Adult  Goal: Free from fall injury  Outcome: Not Progressing     Problem: Behavior  Goal: Pt/Family maintain appropriate behavior and adhere to behavioral management agreement, if implemented  Description: INTERVENTIONS:1. Assess patient/family's coping skills and  non-compliant behavior (including use of illegal substances)2. Notify security of behavior or suspected illegal substances which indicate the need for search of the family and/or belongings3. Encourage verbalization of thoughts and concerns in a socially appropriate manner4. Utilize positive, consistent limit setting strategies supporting safety of patient, staff and others5. Encourage participation in the decision making process about the behavioral management agreement6. If a visitor's behavior poses a threat to safety call refer to organization policy.7. Initiate consult with , Psychosocial CNS, Spiritual Care as appropriate  Outcome: Not Progressing     Problem: Skin/Tissue Integrity  Goal: Skin integrity remains intact  Description: 1.  Monitor for areas of redness and/or skin breakdown2.  Assess vascular access sites hourly3.  Every 4-6 hours minimum:  Change oxygen saturation probe site4.  Every 4-6 hours:  If on nasal continuous positive airway pressure, respiratory therapy assess nares and determine need for appliance change or resting period  Outcome: Not Progressing     Problem: Confusion  Goal: Confusion, delirium, dementia, or psychosis is improved or at baseline  Description: INTERVENTIONS:1. Assess for possible contributors to thought disturbance, including medications, impaired vision or hearing, underlying metabolic abnormalities, dehydration, psychiatric diagnoses, and notify attending LIP2. [place] high risk fall precautions, as indicated3. Provide frequent short contacts to provide reality reorientation, refocusing and direction4. Decrease environmental stimuli, including noise as appropriate5. 
  Problem: Safety - Adult  Goal: Free from fall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   On exam pt is not cooperative at baseline, he hits and punches, would not follow commands, this is his baseline. SANCHEZ X 4 to self purpose.   Large golf ball size hematoma with small lac that does not appear to need sutures or closure. F/U CT of the head findings.   
Not Progressing     Problem: Decision Making  Goal: Pt/Family able to effectively weigh alternatives and participate in decision making related to treatment and care  Description: INTERVENTIONS:1. Determine when there are differences between patient's view, family's view, and healthcare provider's view of condition2. Facilitate patient and family articulation of goals for care3. Help patient and family identify pros/cons of alternative solutions4. Provide information as requested by patient/family5. Respect patient/family right to receive or not to receive information6. Serve as a liaison between patient and family and health care team7. Initiate Consults from Ethics, Palliative Care or initiate Family Care Conference as is appropriate  Outcome: Not Progressing     Problem: Confusion  Goal: Confusion, delirium, dementia, or psychosis is improved or at baseline  Description: INTERVENTIONS:1. Assess for possible contributors to thought disturbance, including medications, impaired vision or hearing, underlying metabolic abnormalities, dehydration, psychiatric diagnoses, and notify attending LIP2. [place] high risk fall precautions, as indicated3. Provide frequent short contacts to provide reality reorientation, refocusing and direction4. Decrease environmental stimuli, including noise as appropriate5. Monitor and intervene to maintain adequate nutrition, hydration, elimination, sleep and activity6. If unable to ensure safety without constant attention obtain sitter and review sitter guidelines with assigned personnel7. Initiate Psychosocial CNS and Spiritual Care consult, as indicated  Outcome: Not Progressing    Problem: Safety - Adult  Goal: Free from fall injury  Outcome: Progressing     Problem: Pain  Goal: Verbalizes/displays adequate comfort level or baseline comfort level  Outcome: Progressing     Problem: Anxiety  Goal: Will report anxiety at manageable levels  Description: INTERVENTIONS:1. Administer 
attending LIP2. [place] high risk fall precautions, as indicated3. Provide frequent short contacts to provide reality reorientation, refocusing and direction4. Decrease environmental stimuli, including noise as appropriate5. Monitor and intervene to maintain adequate nutrition, hydration, elimination, sleep and activity6. If unable to ensure safety without constant attention obtain sitter and review sitter guidelines with assigned personnel7. Initiate Psychosocial CNS and Spiritual Care consult, as indicated  Outcome: Not Progressing     
suicidal thoughts or statements do not leave alone, initiate Suicide Precautions, move near nurse station, obtain sitter  Note: Patient will have brightened mood with a decrease in physical abuse toward staff.     Problem: Safety - Adult  Goal: Free from fall injury  6/14/2025 1234 by Carrie Morales RN  Outcome: Not Progressing  Flowsheets (Taken 6/14/2025 1234)  Free From Fall Injury: Instruct family/caregiver on patient safety  Note: Fall precautions intact for patient. Safety sitter at side. Patient refuses to wear gripper socks. Patient will be receptive to safety precautions.  6/13/2025 2326 by Allison Sandoval LPN  Outcome: Progressing     Problem: Behavior  Goal: Pt/Family maintain appropriate behavior and adhere to behavioral management agreement, if implemented  Description: INTERVENTIONS:1. Assess patient/family's coping skills and  non-compliant behavior (including use of illegal substances)2. Notify security of behavior or suspected illegal substances which indicate the need for search of the family and/or belongings3. Encourage verbalization of thoughts and concerns in a socially appropriate manner4. Utilize positive, consistent limit setting strategies supporting safety of patient, staff and others5. Encourage participation in the decision making process about the behavioral management agreement6. If a visitor's behavior poses a threat to safety call refer to organization policy.7. Initiate consult with , Psychosocial CNS, Spiritual Care as appropriate  Outcome: Not Progressing  Flowsheets  Taken 6/14/2025 1234 by Carrie Morales, RN  Patient/family maintains appropriate behavior and adheres to behavioral management agreement, if implemented:   Encourage verbalization of thoughts and concerns in a socially appropriate manner   Utilize positive, consistent limit setting strategies supporting safety of patient, staff and others   Encourage participation in the

## 2025-06-24 NOTE — PROGRESS NOTES
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 specific plan to harm self Speech increased latency of 
Department of Psychiatry  AttendingProgress Note  Chief Complaint: lilian Gordon was placed in a hospital bed yesterday and appears more comfortable. He urinated on the floor earlier.   Norco and Valium for pain and agitation. Was aggressive with staff last night.   Per nursing note  Patient awaken drenched in urine with gown and bed linens soaked. Agitated and aggressive toward staff while giving care. Patient physically abusive, hitting and punching staff. Also aggressively pinching left upper arm with right hand and scratching arms with fingernails attempting to cause self harm.Valium 5 mg given IM. Attempts made to redirect patient, calm and give emotional support while giving bed bath. All skin treatments given as ordered tolerated poorly with patient kicking and attempting to strike staff. Total of 3 staff was needed to administer care to patient. Patient reaching for staff's hands and attempting to bite. Clean linens and a clean gown applied. Patient repeatedly pushing call light with foot or hands. Safety sitter at bedside. Writer asked patient if he was in pain and patient nodded his head. Norco crushed and given in pudding. Patient ate all of pudding cup and drank cup of Pepsi. Valium 5 mg given IM.     Appears to get most agitated when care is given by staff.     Patient's chart was reviewed and collaborated with  about the treatment plan.  SUBJECTIVE:    Patient is feeling SPRING. Suicidal ideation:  Spring.  Patient does not have medication side effects.    ROS: Patient has new complaints: no  Sleeping adequately:  Yes   Appetite adequate: Yes  Attending groups: No:   Visitors:Yes    OBJECTIVE    Physical  VITALS:  /83   Pulse 68   Temp 97.3 °F (36.3 °C) (Temporal)   Resp 15   Ht 1.803 m (5' 11\")   Wt 111.1 kg (244 lb 14.9 oz)   SpO2 98%   BMI 34.16 kg/m²     Mental Status Examination:  Patients appearance was hospital attire. Thoughts are Paucity of Ideas. Homicidal ideations 
Department of Psychiatry  AttendingProgress Note  Chief Complaint: parkinson  Evan has been essentially unchanged. He is nonverbal and at times uncooperative. Hospice met with Evan and will follow with him when he returns to [facility].  Patient's chart was reviewed and collaborated with  about the treatment plan.  SUBJECTIVE:    Patient is feeling unchanged. Suicidal ideation:  SPRING.  Patient SPRING medication side effects.    ROS: Patient has new complaints: no  Sleeping adequately:  Yes   Appetite adequate: Yes  Attending groups: No:   Visitors:Yes    OBJECTIVE    Physical  VITALS:  /83   Pulse 82   Temp 97.3 °F (36.3 °C) (Temporal)   Resp 16   Ht 1.803 m (5' 11\")   Wt 111.1 kg (244 lb 14.9 oz)   SpO2 99%   BMI 34.16 kg/m²     Mental Status Examination:  Patients appearance was ill-appearing. Thoughts are Paucity of Ideas. Homicidal ideations none.  No abnormal movements, tics or mannerisms.  Memory SPRING Aims 0. Concentration Unable to Assess.   Alert and oriented X 4. Insight and Judgement impaired insight. Patient was uncooperative. Patient gait abnormal. Mood irritable and labile, affect labile affect Hallucinations SPRING, suicidal ideations no specific plan to harm self Speech nonverbal  Data  Labs:   Admission on 06/10/2025   Component Date Value Ref Range Status    WBC 06/11/2025 8.7  4.0 - 11.0 K/uL Final    RBC 06/11/2025 4.34  4.20 - 5.90 M/uL Final    Hemoglobin 06/11/2025 13.7  13.5 - 17.5 g/dL Final    Hematocrit 06/11/2025 40.5  40.5 - 52.5 % Final    MCV 06/11/2025 93.4  80.0 - 100.0 fL Final    MCH 06/11/2025 31.6  26.0 - 34.0 pg Final    MCHC 06/11/2025 33.8  31.0 - 36.0 g/dL Final    RDW 06/11/2025 13.3  12.4 - 15.4 % Final    Platelets 06/11/2025 162  135 - 450 K/uL Final    MPV 06/11/2025 8.9  5.0 - 10.5 fL Final    Neutrophils % 06/11/2025 64.9  % Final    Lymphocytes % 06/11/2025 24.5  % Final    Monocytes % 06/11/2025 8.0  % Final    Eosinophils % 06/11/2025 2.1  % 
Department of Psychiatry  AttendingProgress Note  Chief Complaint: parkinson  Evan remains nonverbal with me but will speak with staff at times. He was in bed today and would not respond to me and was facing the wall naked. [facility] 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 Had prn Trazodone and Atarax last night.   Yesterday was repeatedly pressing the call light in room for unclear reasons.   Exelon 1.5 mg BID for his dementia with psychotic sxs    Increased Depakote ER sprinkles 250 mg BID .   Addendum 1348   Patient was kicking, hitting and biting staff.  Gave Zyprexa 10 mg once IM .       Patient's chart was reviewed and collaborated with  about the treatment plan.  SUBJECTIVE:    Patient is feeling SPRING. Suicidal ideation:  SPRING.  Patient SPRING medication side effects.    ROS: Patient has new complaints: no  Sleeping adequately:  Yes   Appetite adequate: Yes  Attending groups: No:   Visitors:Yes    OBJECTIVE    Physical  VITALS:  /60   Pulse 66   Temp 97.7 °F (36.5 °C) (Temporal)   Resp 17   Ht 1.803 m (5' 11\")   Wt 111.1 kg (244 lb 14.9 oz)   SpO2 96%   BMI 34.16 kg/m²     Mental Status Examination:  Patients appearance was ill-appearing. Thoughts are Paucity of Ideas. Homicidal ideations none.  No abnormal movements, tics or mannerisms.  Memory refeused to respond Aims 0. Concentration Unable to Assess.   Alert and oriented X 4. Insight and Judgement SPRING. Patient was uncooperative. Patient gait abnormal. Mood irritable and labile, affect labile affect Hallucinations PSRING, suicidal ideations no specific plan to harm self Speech delayed  Data  Labs:   Admission on 06/10/2025   Component Date Value Ref Range Status    WBC 06/11/2025 8.7  4.0 - 11.0 K/uL Final    RBC 06/11/2025 4.34  4.20 - 5.90 M/uL Final    Hemoglobin 06/11/2025 13.7  13.5 - 17.5 g/dL Final    Hematocrit 06/11/2025 40.5  40.5 - 52.5 % Final    MCV 06/11/2025 93.4  80.0 - 100.0 fL 
Department of Psychiatry  AttendingProgress Note  Chief Complaint: parkinsonmelissa Gordon was in his WC today and had eaten much of his lunch . He was nonverbal while I was with him but did follow directions from nurse.   He was able to use a paper towel to wipe his mouth a couple of times.   Tremors are minimal .   Recent agitation this morning with anger and hitting his chest. He kicked a nurse. Got Zyprexa 10 mg IM .     Patient's chart was reviewed and collaborated with  about the treatment plan.  SUBJECTIVE:    Patient is feeling unchanged. Suicidal ideation:  denies suicidal ideation.  Patient does not have medication side effects.    ROS: Patient has new complaints: no  Sleeping adequately:  Yes   Appetite adequate: Yes  Attending groups: No:   Visitors:Yes    OBJECTIVE    Physical  VITALS:  /86   Pulse 76   Temp 97.1 °F (36.2 °C) (Temporal)   Resp 18   Ht 1.803 m (5' 11\")   Wt 111.1 kg (244 lb 14.9 oz)   SpO2 93%   BMI 34.16 kg/m²     Mental Status Examination:  Patients appearance was ill-appearing. Thoughts are Paucity of Ideas. Homicidal ideations none.  No abnormal movements, tics or mannerisms.  Memory intact Aims 0. Concentration Poor.   Alert and oriented X 4. Insight and Judgement impaired insight. Patient was uncooperative. Patient gait abnormal. Mood irritable and labile, affect labile affect Hallucinations Absent, suicidal ideations no specific plan to harm self Speech increased latency of response  Data  Labs:   Admission on 06/10/2025   Component Date Value Ref Range Status    WBC 06/11/2025 8.7  4.0 - 11.0 K/uL Final    RBC 06/11/2025 4.34  4.20 - 5.90 M/uL Final    Hemoglobin 06/11/2025 13.7  13.5 - 17.5 g/dL Final    Hematocrit 06/11/2025 40.5  40.5 - 52.5 % Final    MCV 06/11/2025 93.4  80.0 - 100.0 fL Final    MCH 06/11/2025 31.6  26.0 - 34.0 pg Final    MCHC 06/11/2025 33.8  31.0 - 36.0 g/dL Final    RDW 06/11/2025 13.3  12.4 - 15.4 % Final    Platelets 06/11/2025 162  
Department of Psychiatry  Progress Note    Patient's chart was reviewed. Discussed with treatment team. Met with patient.     SUBJECTIVE:      Required multiple prns last night and this morning for agitation, and aggressive behavior - punching, kicking, spitting, biting.    Has not been taking scheduled medication.    Tolerated Valium IM this morning.    I met with him this afternoon. He was in the milieu with his hands over his face. He declined to speak with me. When I tried to move his hands from his face; he tried to hit me.     He has no spontaneous complaints.     Nursing spoke with family who is considering palliative care and is supportive of IM backup medication.     ROS:   Patient has new complaints: no  Sleeping adequately:  no  Appetite adequate: No:   Engaged in programming: No:    OBJECTIVE:  VITALS:  /74   Pulse 51   Temp 97.3 °F (36.3 °C) (Temporal)   Resp 18   Ht 1.803 m (5' 11\")   Wt 111.1 kg (244 lb 14.9 oz)   SpO2 100%   BMI 34.16 kg/m²     Mental Status Examination:    Appearance: poor grooming and hygiene  Behavior/Attitude toward examiner: irritable  Speech: non-verbal  Mood:  unable to assess   Affect: unable to assess   Thought processes: impaired   Thought Content: unable to assess  Perceptions: not RTIS  Attention:  impaired   Abstraction:  impaired   Cognition:   impaired   Insight:  impaired   Judgment:  impaired     Medication:  Scheduled:   OLANZapine  10 mg IntraMUSCular Once    divalproex  250 mg Oral BID    rivastigmine  1.5 mg Oral BID    mineral oil-hydrophilic petrolatum   Topical BID    furosemide  40 mg Oral BID    carbidopa-levodopa  1 capsule Oral TID    nicotine  1 patch TransDERmal Daily    tamsulosin  0.4 mg Oral Daily    gabapentin  100 mg Oral BID    vitamin B-12  1,000 mcg Oral Daily    lisinopril  10 mg Oral Daily    celecoxib  200 mg Oral Daily    miconazole   Topical BID    pantoprazole  40 mg Oral QAM AC    polyethylene glycol  17 g Oral Daily    
Inpatient Occupational Therapy Evaluation & Treatment    Unit: [facility]  Date:  6/12/2025  Patient Name:    Arsalan Silverio  Admitting diagnosis:  Aggressive behavior [R46.89]  Dementia with behavioral disturbance (HCC) [F03.918]  Admit Date:  6/10/2025  Precautions/Restrictions/WB Status/ Lines/ Wounds/ Oxygen: Fall risk, Standard [facility] Precautions, and 1:1 Supervision    Pt seen for cotreatment this date due to patient safety, patient endurance, complexity of condition, acute illness/injury, and limited functional status information    Treatment Time:  6348-7987  Treatment Number:  1  Timed Code Treatment Minutes: 23 minutes  Total Treatment Minutes:  33  minutes    Patient Goals for Therapy: none stated, agreeable to get up         Discharge Recommendations: LTC with skilled therapy  DME needs for discharge: Needs Met       Therapy recommendations for staff:   Assist of 2 for transfers with use of rolling walker (RW) and gait belt to/from chair  to/from bathroom    History of Present Illness: Per H&P from Dr. Talley on 6/10/25:  \"75 y.o. male with history seen below as well as history of Parkinson's disease presenting with aggressive behavior at nursing facility.  He states the patient was recently admitted psychiatrically for aggressive behavior and similar symptoms.  States patient has been back at their facility for 1 to 2 weeks but tonight he became increasingly aggressive and was spitting and hitting so patient was sent into the emergency department.  Nursing facility states that it is not uncommon for patient to have aggressive behaviors but seems to be worsening.  States patient is very slow to speak and often does not answer questions and prefers to use his hands to communicate.  Denies fevers, recent falls or trauma.  States otherwise patient has been at baseline\"    Preadmission Environment:   [facility] LTC at [facility]  Pt uses manual wheelchair and RW.  Unknown level of assistance.    AM-PAC 
New order received from TYLER RIOS/C Haldol and give Zyprexa 10 mg IM x one. Son called and updated on patient's status. Stated that patient had started this behavior 3 months ago. Voices that they are at a loss of what to do with patient. Also voiced that they were aware of patient's violent behavior and that he had been subjected to  it as well.   
Notified pt's family per request of negative head CT results.  Also notified unit nurse manager.  
Occupational Therapy/Physical Therapy Attempt  Orders received, chart reviewed, and spoke with RN. Per RN, pt agitated and combative with RN staff at this time, and requested therapy to attempt at a later time. Will continue to follow. Re-attempt as appropriate and schedule permits.    Sachi Chavez, OTR/L #[id number]   Lexi Garnica, PT DPT    
Occupational/Physical Therapy  Attempted to see patient this date. Patient held this date due to status, working with nurses. Will continue to follow as patient status allows.    Flavia Marroquin, OTR/L [id number]  Mango Dotson, PT, DPT         
Patient awaken drenched in urine with gown and bed linens soaked. Agitated and aggressive toward staff while giving care. Patient physically abusive, hitting and punching staff. Also aggressively pinching left upper arm with right hand and scratching arms with fingernails attempting to cause self harm.Valium 5 mg given IM. Attempts made to redirect patient, calm and give emotional support while giving bed bath. All skin treatments given as ordered tolerated poorly with patient kicking and attempting to strike staff. Total of 3 staff was needed to administer care to patient. Patient reaching for staff's hands and attempting to bite. Clean linens and a clean gown applied. Patient repeatedly pushing call light with foot or hands. Safety sitter at bedside. Writer asked patient if he was in pain and patient nodded his head. Norco crushed and given in pudding. Patient ate all of pudding cup and drank cup of Pepsi. Valium 5 mg given IM. Safety precautions intact.   
Patient becoming restless but will not answer any questions,  concerned he needs to use bathroom, transferred x 3 staff to bathroom patient able to follow some direction, able to transfer but not ambulate. Patient urine dark rona. Zyprexa 10 mg IM appears effective for aggression, patient has calmed down, will monitor.   
Patient has flat affect, A&O to self only. Patient initially cooperative and took PO medications. Patient uncooperative and combative with care. Patient attempted to bite staff multiple times this evening. Attempting to spit on sitter and seen putting hands down pants multiple times this evening. Patient became more noncompliant if told he wasn't able to do something. Patient took rounding sheet from sitter, ripped it and put it in pocket. Sitter and nurse attempted to retrieve sheet multiple times. Patient unwilling to lay in bed, patient tried to hit and bite staff when ambulating patient into bed. PRN zyprexa 5 mg IM given for agitation. Patient resting in room at this time, plan of care ongoing.  
Patient has tolerated Valium without any adverse reactions noted. Has been up in wheel chair visible on the unit.  at side.   
Patient is resting in bed with eyes closed. Respirations easy and even after receiving Valium 5 mg IM and Norco for pain. No distress at present. Sitter remains at bedside.   
Patient up for supper and to visit with wife Gabi. Took medication as ordered crushed in pudding. Appetite good with fluids taken well. Patient continues with some combativeness during care rendered per staff. Assisted to wheelchair x 2. Patient out in the dining room with wife. Uncooperative with wife during visit, attempting to step on her foot and push wheelchair into her leg. Ate 75% of meal with 250 ml of fluids taken. Assisted back to room after wife left, cleaned and dressed for bed. Pain med given at supper effective. Sitter at bedside. HOB at 30 degrees and side rails up x 2. No distress at present. Call light within reach.  
Pt calm and cooperative so far this shift.  He remains A+O to self only and minimally verbal.  He denies SI/HI/AVH and no RTIS noted.  He was medication compliant with meds crushed and floated in pudding.  Staff assisted him to bed and he is currently resting.  1:1 continued for fall safety.  He remains, impulsive, weak, unsteady, and has very poor safety awareness.  
Pt has been ordered to have a medical bed. This nurse informs clinical nurse manager and unit manager, Clementina BAE. This nurse then places call to hospital maintenance dept general line and leaves detailed voice message informing them. This nurse also leaves detailed voice message for  line. At this time no return call has been received.   
Pt seen awake wanting to get out of bed. Pt noted with irritable mood and can't express his needs. Removed his clothes. Offered sandwich and eats with good appetite. PRN zyprexa given mix in drinks. Needs attended.   
Pt was checked and changed as per care plan. Comfortable and settled following the change. Plan of care is ongoing.   
Pt was checked and changed as per care plan. Skin intact, no signs of irritation or redness. Comfortable and settled following the change.  
Sitter briefly replaced by Marsha COLLADO who went to stay with pt while assigned sitter took brief break.  Pt got angry and and started hitting his chest. Marsha went to him to stop him from hurting himself.When she got close to her he kicked her.  Marsha was not hurt nor was the patient. This was at approximately 0515. This nurse called Jo-Ann Sherman CNP to talk with her regarding medications. She did increase pt's IM Zyprexa to 10 mg.   Order placed.  Gathered available staff, as well as Travis from security. Lightly stabilized pt's arm while giving the Zyprexa 10 mg IM into right deltoid.    0615  Pt awake but less agitated.  
Unable to obtain vital signs from patient due to aggressive behaviors, swings, grabs at and tries to kick staff. Also keeps backing his wheelchair into the wall, will unlock brakes after staff locks them. Will not keep his gown on. Attempted to give him meds, crushed in pepsi, patient spit the meds out. Has thrown his meal trays across the room.  
When writer returned from bathroom, observed patient laying on floor with his forehead touching floor. Several staff were  present with patient. Stated that they heard a thud and seen patient laying on the floor with his wheel chair behind him.  was behind patient and he stated that patient leaned forward to pick something up and toppled head first from wheel chair and hit head. Upon assessment of patient writer noted a raised lump with a small abrasion present. Small amt of blood was visibile to wound. Patient alert to self. Combative with staff during assessment and delivering care. Area to right forehead cleansed with normal saline and Mepilex dressing applied. VS 97.6, 78, 18, 155/84 O2 sat 98%.Rosibel Lang FNP called and aware. Order received for CT of Head. Transport called for transfer to radiology. Call placed to TYLER Pollack CNP and updated on patient's status. Message left for both wife and son of patient's status. 1622 Transport here to take patient to CT Scan. PCA Ky went with patient to assist if needed. Ticket to ride sent as well. Call placed per writer to Unit Manager Clementina Gardner and updated on patient's status. Patient returned back to unit at 1640. Family at bedside with patient. Spoke with family and updated on patient's status. Family voiced that it had happened at home too and that they completely understood. VS 97.7, 57,16,133/86, 100% on room air. Patient alert to self. Appears to be enjoying time with family.  at bedside.  
Zyprexa 10 mg IM injected to left deltoid. Tolerated well. Patient has drank to large glasses of Pepsi but refused to eat lunch. Encouragement given several times to eat lunch without effect. Sitting in wheel chair by nurse's desk with  at side.   
uses manual wheelchair and RW.  Unknown level of assistance.    AM-PAC Mobility Score    AM-PAC Inpatient Mobility Raw Score : 11       Subjective  Patient sitting up in chair with  1:1 sitter reading a magazine .  Pt agreeable to this PT session.     Cognition    A&O orientation not directly assessed.    Able to follow 1 step commands, 50% of the time  Pt reportedly communicates with thumbs up or down. Pt appears to be non-verbal.    Pain   No      Activity Tolerance   During therapy session noted pt with no adverse symptoms    Pt Position BP (mmHg) HR (bpm) SpO2 (%) on RA  Comments   Supine at rest       Seated at EOB       Standing       End of session         Objective  Does this pt have an acute or acute on chronic diagnosis of CHF? No    Upper Extremity ROM/Strength  Please see OT evaluation.      Lower Extremity ROM / Strength   AROM WFL: No  ROM limitations: Grossly decreased extension in BLE    BLE strength impaired, but not formally assessed with MMT.    Lower Extremity Sensation    NT    Coordination  Diminished    Tone  Not Tested    Balance  Static Sitting:  SBA  Dynamic Sitting:  Min A    Comments: wheelchair    Static Standing: Min A   Dynamic Standing:  Min A to Mod A   Comments: Gait belt and RW    Posture  Seated: Forward head and neck and Thoracic kyphosis  Standing: Forward head and neck and Thoracic kyphosis, asymmetrical    Bed Mobility   Supine to Sit:    Not Tested  Sit to Supine:   Not Tested  Rolling:   Not Tested  Scooting in sitting: Min A forward in wheelchair  Scooting in supine:  Not Tested   Bridging:  Not Tested  Comments:     Transfer Training     Sit to stand:   Max A , 2 person, and With RW and gait belt  Stand to sit:   Max A , 2 person, and With RW and gait belt  Bed to/from Chair:  Not Tested   Comments: Therapist required to place feet in proper position prior to standing as pt's stance is initially narrow/scissoring.    Gait gait completed as indicated below  Distance:  
Final    Albumin 06/11/2025 3.2 (L)  3.4 - 5.0 g/dL Final    Albumin/Globulin Ratio 06/11/2025 1.5  1.1 - 2.2 Final    Total Bilirubin 06/11/2025 <0.2  0.0 - 1.0 mg/dL Final    Alkaline Phosphatase 06/11/2025 84  40 - 129 U/L Final    ALT 06/11/2025 7 (L)  10 - 40 U/L Final    AST 06/11/2025 19  15 - 37 U/L Final    Ethanol Lvl 06/11/2025 None Detected  mg/dL Final    Comment:    None Detected  Conversion factor:  100 mg/dl = .100 g/dl  For Medical Purposes Only      Salicylate Lvl 06/11/2025 <0.5 (L)  15.0 - 30.0 mg/dL Final    Comment: Therapeutic Range: 15.0-30.0 mg/dL  Toxic: >30.0 mg/dL      Acetaminophen Level 06/11/2025 <5 (L)  10 - 30 ug/mL Final    Comment: Therapeutic Range: 10.0-30.0 ug/mL  Toxic: >=150 ug/mL      TSH 06/11/2025 0.85  0.27 - 4.20 uIU/mL Final    Ventricular Rate 06/12/2025 63  BPM Final    Atrial Rate 06/12/2025 63  BPM Final    P-R Interval 06/12/2025 154  ms Final    QRS Duration 06/12/2025 112  ms Final    Q-T Interval 06/12/2025 446  ms Final    QTc Calculation (Bazett) 06/12/2025 456  ms Final    P Axis 06/12/2025 68  degrees Final    R Axis 06/12/2025 48  degrees Final    T Axis 06/12/2025 60  degrees Final    Diagnosis 06/12/2025 Normal sinus rhythmIncomplete right bundle branch blockBorderline ECGWhen compared with ECG of 30-SEP-2022 08:10,No significant change was foundConfirmed by MELISSA CANCHOLA MD ([id number]) on 6/13/2025 7:54:26 AM   Final    Cholesterol, Total 06/11/2025 149  0 - 199 mg/dL Final    Triglycerides 06/11/2025 90  0 - 150 mg/dL Final    HDL 06/11/2025 33 (L)  40 - 60 mg/dL Final    Comment: An HDL cholesterol less than 40 mg/dL is low and  constitutes a coronary heart disease risk factor.  An HDL cholesterol greater than 60 mg/dL is a  negative risk factor for coronary heart disease.      LDL Cholesterol 06/11/2025 98  <100 mg/dL Final    VLDL Cholesterol Calculated 06/11/2025 18  Not Established mg/dL Final    Hemoglobin A1C 06/11/2025 5.2  See comment % Final    
RECOMMENDATIONS UPDATE:   Continue with group therapies, education of coping skills   Continue to monitor patient on unit.  Medications provided/ medication compliance by patient.  Continue for plans to obtain long term goals after discharge.    SHORT-TERM GOALS UPDATE:  Time frame for Short-Term Goals: 8-14days     LONG-TERM GOALS UPDATE:  Time frame for Long-Term Goals: 6 months  Members Present in Team Meeting: See Signature Sheet    FAHEEM SANDOVAL RN

## 2025-06-24 NOTE — DISCHARGE SUMMARY
Discharge Summary   Admit Date: 6/10/2025   Discharge Date:    6/24/2025  Condition at DC unstable   Transfer to [facility] with Hospice Follow up    Spent over 40 minutes with patient and staff on DCplanning with more than 50 % of time spent with patient discussing care  Final Dx: axis I: Dementia with behavioral disturbance (HCC)   Axis 2: No diagnosis  [place] 3: See Medical History    And Present on Admission:   Dementia with behavioral disturbance (HCC)   Primary hypertension   Parkinson disease (HCC)   BPH (benign prostatic hyperplasia)   Aggressive behavior     Axis 4: Problems related to the social environment  Axis 5:  On Admission: 41-50 serious symptoms At Discharge: 41-50 serious symptoms   All conditions on Axis 1 and Axis 2 and active problems on Axis 3 were treated while patient was hospitalized. (  Active Hospital Problems    Diagnosis Date Noted    Dementia with behavioral disturbance (HCC) [F03.918] 06/11/2025    Primary hypertension [I10] 06/11/2025    Parkinson disease (HCC) [G20.A1] 06/11/2025    BPH (benign prostatic hyperplasia) [N40.0] 06/11/2025    Aggressive behavior [R46.89] 06/11/2025   )   Condition on DC  Mood and affect are stable and pt is not suicidal   VITALS:  BP 96/70   Pulse 100   Temp 97.8 °F (36.6 °C) (Temporal)   Resp 18   Ht 1.803 m (5' 11\")   Wt 111.1 kg (244 lb 14.9 oz)   SpO2 100%   BMI 34.16 kg/m²   Brief Summary Present Illness     CC:  parkinsons, dementia and behavioral disturbance     HPI:   Patient seen in room on Adult Behavioral Unit.   Patient is a 75 y.o. male who presented to the ED at [facility] from [facility] with behavioral issues.   Evan was recently dc from [facility] 6/7/25 with aggressive behaviors. He had been at the Home with biting, spitting and aggression toward staff and other residents, He has a dx of Parkinson's and apparently he was started on Amantadine and this may have contributed to his decompensation. He is

## 2025-06-24 NOTE — BH NOTE
Patient left the unit at 1316 via stretcher with transport. [facility] @ [phone].

## 2025-06-24 NOTE — GROUP NOTE
Group Therapy Note    Date: 6/24/2025    Group Start Time: 1000  Group End Time: 1100  Group Topic: Activity    [facility] Shantel Dewitt MSW      Patient was invited to group but unable to attend.               Signature:  BRYAN Mercado

## 2025-06-24 NOTE — CARE COORDINATION
Spoke with pt's son and wife and discussed pt's DC back to [facility] today with Hospice Services in place.             10:45 - Spoke with Jennifer with [facility] regarding details of pt's DC today. They need AVS [facility] CHELE faxed to them at [phone].      11:15 - AVS [facility] CHELE faxed to [facility]'s [place].         11:15 - Spoke with pt's son and updated on details of DC.

## (undated) DEVICE — GLOVE SURG SZ 65 L12IN FNGR THK94MIL STD WHT LTX FREE

## (undated) DEVICE — MATERIAL PD W2XL4YD ST COT CAST SPLNT NONADHESIVE SPEC

## (undated) DEVICE — GOWN,SIRUS,NON REINFRCD,LARGE,SET IN SL: Brand: MEDLINE

## (undated) DEVICE — SUTURE ETHLN SZ 4-0 L18IN NONABSORBABLE BLK L19MM PS-2 3/8 1667H

## (undated) DEVICE — GLOVE SURG SZ 75 L12IN FNGR THK94MIL STD WHT LTX FREE

## (undated) DEVICE — ZIMMER® STERILE DISPOSABLE TOURNIQUET CUFF WITH PLC, DUAL PORT, SINGLE BLADDER, 24 IN. (61 CM)

## (undated) DEVICE — PADDING CAST W2INXL4YD COT LO LINTING WYTEX

## (undated) DEVICE — 3M™ TEGADERM™ TRANSPARENT FILM DRESSING FRAME STYLE, 1624W, 2-3/8 IN X 2-3/4 IN (6 CM X 7 CM), 100/CT 4CT/CASE: Brand: 3M™ TEGADERM™

## (undated) DEVICE — GLOVE,SURG,SENSICARE,ALOE,LF,PF,7: Brand: MEDLINE

## (undated) DEVICE — CORD ES L12FT BPLR FRCP

## (undated) DEVICE — COMFO-TEX ELASTIC BANDAGE LATEX FREE, 3INX5YD: Brand: COMFO-TEX™

## (undated) DEVICE — SOLUTION IV 1000ML LAC RINGERS PH 6.5 INJ USP VIAFLX PLAS

## (undated) DEVICE — SET GRAV VENT NVENT CK VLV 3 NDL FREE PRT 10 GTT

## (undated) DEVICE — GLOVE,SURG,SENSICARE,ALOE,LF,PF,6: Brand: MEDLINE

## (undated) DEVICE — SOLUTION IV IRRIG 500ML 0.9% SODIUM CHL 2F7123

## (undated) DEVICE — CATHETER IV 20GA L1.25IN PNK FEP SFTY STR HUB RADPQ DISP

## (undated) DEVICE — Z DISCONTINUED USE 2275676 GLOVE SURG SZ 65 L12IN FNGR THK87MIL DK GRN LTX FREE ISOLEX

## (undated) DEVICE — CHLORAPREP 26ML ORANGE

## (undated) DEVICE — Device

## (undated) DEVICE — SET ADMIN PRIMING 7ML L30IN 7.35LB 20 GTT 2ND RLER CLMP

## (undated) DEVICE — SET KNF FOR MINI CRPL TUNN REL SYS SFGRD 5PK